# Patient Record
Sex: FEMALE | Race: WHITE | Employment: OTHER | URBAN - METROPOLITAN AREA
[De-identification: names, ages, dates, MRNs, and addresses within clinical notes are randomized per-mention and may not be internally consistent; named-entity substitution may affect disease eponyms.]

---

## 2017-04-27 PROBLEM — E66.01 MORBID OBESITY (HCC): Status: ACTIVE | Noted: 2017-04-27

## 2018-03-05 PROBLEM — E11.9 TYPE 2 DIABETES MELLITUS, WITHOUT LONG-TERM CURRENT USE OF INSULIN (HCC): Status: ACTIVE | Noted: 2018-03-05

## 2018-03-12 RX ORDER — ALLOPURINOL 100 MG/1
100 TABLET ORAL 2 TIMES DAILY
Qty: 180 TABLET | Refills: 1 | Status: SHIPPED | OUTPATIENT
Start: 2018-03-12 | End: 2018-10-05 | Stop reason: SDUPTHER

## 2018-03-16 ENCOUNTER — HOSPITAL ENCOUNTER (OUTPATIENT)
Dept: WOUND CARE | Age: 83
Discharge: HOME | End: 2018-03-16
Admitting: FAMILY MEDICINE
Payer: COMMERCIAL

## 2018-03-16 VITALS
HEIGHT: 68 IN | TEMPERATURE: 97.4 F | SYSTOLIC BLOOD PRESSURE: 120 MMHG | BODY MASS INDEX: 41.83 KG/M2 | HEART RATE: 70 BPM | WEIGHT: 276 LBS | DIASTOLIC BLOOD PRESSURE: 62 MMHG | RESPIRATION RATE: 20 BRPM

## 2018-03-16 PROCEDURE — 11042 DBRDMT SUBQ TIS 1ST 20SQCM/<: CPT

## 2018-03-16 PROCEDURE — 11042 DBRDMT SUBQ TIS 1ST 20SQCM/<: CPT | Performed by: FAMILY MEDICINE

## 2018-03-16 NOTE — PROGRESS NOTES
Follow-Up Wound Progress Note  Danie Bueno  AGE: 80 y.o. GENDER: female  DOD: 1935  TODAY'S DATE:  3/16/18  Subjective:    Danie Bueno is a 80 y.o. female who presents today for wound check. Wound Etiology :  pressure ulcer: Stage III  Wound Location :   on the right sacrum Pain : {3/10     Abx : Present      Overall Wound Assessment  Wound is is unchanged. Size has increased    Objective:    /62   Pulse 70   Temp 97.4 °F (36.3 °C) (Oral)   Resp 20   Ht 5' 8\" (1.727 m)   Wt 276 lb (125.2 kg)   BMI 41.97 kg/m²   Pressure Ulcer 01/17/13 Sacrum Inner Stage III new wound #2 pressure, sacral stage 3 (Active)   Sirisha-wound Assessment Maceration;Pale;Pink 3/16/2018 10:13 AM   Sirisha-Wound Moisture Macerated 2/22/2018 10:05 AM   Pressure Ulcer Staging Stage III 8/12/2013 12:36 PM   Wound Assessment Pink 3/16/2018 10:13 AM   Wound Length (cm) 1 cm 3/16/2018 10:54 AM   Wound Width (cm) 0.3 cm 3/16/2018 10:54 AM   Wound Depth (cm)  .5 3/16/2018 10:54 AM   Calculated Wound Size (cm^2) (l*w) 0.3 cm^2 3/16/2018 10:54 AM   Change in Wound Size % (l*w) -200 3/16/2018 10:54 AM   Culture Taken Yes 10/12/2017 11:07 AM   Dressing Status Clean;Dry; Intact 3/16/2018 11:11 AM   Dressing Changed Changed/New 3/16/2018 11:11 AM   Dressing/Treatment Alginate;Dry dressing;Silver dressing 3/16/2018 11:11 AM   Wound Cleansed Rinsed/Irrigated with saline 3/16/2018 11:11 AM   Dressing Change Due 02/23/18 2/22/2018 10:39 AM   Drainage Amount Scant 3/16/2018 10:13 AM   Drainage Description Serous; Yellow 3/16/2018 10:13 AM   Odor None 3/16/2018 10:13 AM   [place]%Wound Bed 100 1/11/2018  9:36 AM   Red%Wound Bed 100 11/10/2016 10:35 AM   Yellow%Wound Bed 30 9/15/2016 10:03 AM   Debridement per physician Subcutaneous 3/16/2018 10:54 AM   Time out Yes 3/16/2018 10:54 AM   Procedural Pain 7 3/16/2018 10:54 AM   Post procedural Pain 3 3/16/2018 10:54 AM   Number of days: 1883     Wound   serous exudate noted  Errythema - Absent Abigail Dickerson DO                           3/16/18     1:49 PM

## 2018-04-06 ENCOUNTER — HOSPITAL ENCOUNTER (OUTPATIENT)
Dept: WOUND CARE | Age: 83
Discharge: HOME | End: 2018-04-06
Payer: COMMERCIAL

## 2018-04-06 VITALS
SYSTOLIC BLOOD PRESSURE: 126 MMHG | RESPIRATION RATE: 20 BRPM | HEART RATE: 76 BPM | DIASTOLIC BLOOD PRESSURE: 68 MMHG | TEMPERATURE: 97.5 F

## 2018-04-06 PROCEDURE — 11042 DBRDMT SUBQ TIS 1ST 20SQCM/<: CPT

## 2018-04-09 ENCOUNTER — ANTI-COAG VISIT (OUTPATIENT)
Dept: PRIMARY CARE CLINIC | Age: 83
End: 2018-04-09
Payer: COMMERCIAL

## 2018-04-09 ENCOUNTER — OFFICE VISIT (OUTPATIENT)
Dept: PRIMARY CARE CLINIC | Age: 83
End: 2018-04-09
Payer: COMMERCIAL

## 2018-04-09 VITALS
BODY MASS INDEX: 41.68 KG/M2 | SYSTOLIC BLOOD PRESSURE: 128 MMHG | OXYGEN SATURATION: 98 % | HEART RATE: 81 BPM | WEIGHT: 275 LBS | HEIGHT: 68 IN | RESPIRATION RATE: 16 BRPM | DIASTOLIC BLOOD PRESSURE: 82 MMHG

## 2018-04-09 DIAGNOSIS — I48.91 ATRIAL FIBRILLATION, UNSPECIFIED TYPE (HCC): Primary | ICD-10-CM

## 2018-04-09 LAB — INTERNATIONAL NORMALIZATION RATIO, POC: 2.1

## 2018-04-09 PROCEDURE — 99214 OFFICE O/P EST MOD 30 MIN: CPT | Performed by: FAMILY MEDICINE

## 2018-04-09 PROCEDURE — 85610 PROTHROMBIN TIME: CPT | Performed by: FAMILY MEDICINE

## 2018-04-09 RX ORDER — LISINOPRIL 5 MG/1
TABLET ORAL
Refills: 1 | COMMUNITY
Start: 2018-04-05 | End: 2019-05-03

## 2018-04-09 ASSESSMENT — ENCOUNTER SYMPTOMS
BLOOD IN STOOL: 0
NAUSEA: 0
BLURRED VISION: 0
EYE DISCHARGE: 0
HEMOPTYSIS: 0
ABDOMINAL PAIN: 0
ORTHOPNEA: 0
SHORTNESS OF BREATH: 0

## 2018-04-09 ASSESSMENT — PATIENT HEALTH QUESTIONNAIRE - PHQ9
1. LITTLE INTEREST OR PLEASURE IN DOING THINGS: 0
SUM OF ALL RESPONSES TO PHQ QUESTIONS 1-9: 0
SUM OF ALL RESPONSES TO PHQ9 QUESTIONS 1 & 2: 0
2. FEELING DOWN, DEPRESSED OR HOPELESS: 0

## 2018-04-19 ENCOUNTER — HOSPITAL ENCOUNTER (EMERGENCY)
Age: 83
Discharge: HOME | End: 2018-04-19
Attending: EMERGENCY MEDICINE
Payer: COMMERCIAL

## 2018-04-19 ENCOUNTER — APPOINTMENT (OUTPATIENT)
Dept: CT IMAGING | Age: 83
End: 2018-04-19
Payer: COMMERCIAL

## 2018-04-19 VITALS
WEIGHT: 280 LBS | SYSTOLIC BLOOD PRESSURE: 124 MMHG | BODY MASS INDEX: 42.44 KG/M2 | HEIGHT: 68 IN | TEMPERATURE: 97.7 F | RESPIRATION RATE: 16 BRPM | OXYGEN SATURATION: 97 % | DIASTOLIC BLOOD PRESSURE: 60 MMHG | HEART RATE: 76 BPM

## 2018-04-19 DIAGNOSIS — L98.429 CHRONIC ULCER OF SACRAL REGION, UNSPECIFIED ULCER STAGE (HCC): ICD-10-CM

## 2018-04-19 DIAGNOSIS — S09.90XA CLOSED HEAD INJURY, INITIAL ENCOUNTER: Primary | ICD-10-CM
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
## 2018-04-19 LAB
INR BLD: 1.8
PROTHROMBIN TIME: 19.8 SEC (ref 9.3–12.4)

## 2018-04-19 PROCEDURE — 6370000000 HC RX 637 (ALT 250 FOR IP): Performed by: EMERGENCY MEDICINE

## 2018-04-19 PROCEDURE — 70450 CT HEAD/BRAIN W/O DYE: CPT

## 2018-04-19 PROCEDURE — 36415 COLL VENOUS BLD VENIPUNCTURE: CPT

## 2018-04-19 PROCEDURE — 72125 CT NECK SPINE W/O DYE: CPT

## 2018-04-19 PROCEDURE — 99284 EMERGENCY DEPT VISIT MOD MDM: CPT

## 2018-04-19 PROCEDURE — 85610 PROTHROMBIN TIME: CPT

## 2018-04-19 RX ORDER — SODIUM CHLORIDE 0.9 % (FLUSH) 0.9 %
SYRINGE (ML) INJECTION
Status: DISCONTINUED
Start: 2018-04-19 | End: 2018-04-19 | Stop reason: HOSPADM

## 2018-04-19 RX ORDER — HYDROCODONE BITARTRATE AND ACETAMINOPHEN 5; 325 MG/1; MG/1
1 TABLET ORAL ONCE
Status: COMPLETED | OUTPATIENT
Start: 2018-04-19 | End: 2018-04-19

## 2018-04-19 RX ADMIN — HYDROCODONE BITARTRATE AND ACETAMINOPHEN 1 TABLET: 5; 325 TABLET ORAL at 18:03

## 2018-04-19 ASSESSMENT — PAIN DESCRIPTION - LOCATION: LOCATION: BUTTOCKS

## 2018-04-19 ASSESSMENT — ENCOUNTER SYMPTOMS
DIARRHEA: 0
SORE THROAT: 0
TROUBLE SWALLOWING: 0
VOMITING: 0
NAUSEA: 0
CHEST TIGHTNESS: 0
SHORTNESS OF BREATH: 0
CONSTIPATION: 0
WHEEZING: 0
BLOOD IN STOOL: 0
BACK PAIN: 0
COUGH: 0
ABDOMINAL PAIN: 0
RHINORRHEA: 0

## 2018-04-19 ASSESSMENT — PAIN SCALES - GENERAL
PAINLEVEL_OUTOF10: 5
PAINLEVEL_OUTOF10: 5

## 2018-04-19 ASSESSMENT — PAIN DESCRIPTION - PAIN TYPE: TYPE: ACUTE PAIN

## 2018-04-20 ENCOUNTER — HOSPITAL ENCOUNTER (OUTPATIENT)
Dept: WOUND CARE | Age: 83
Discharge: HOME | End: 2018-04-20
Payer: COMMERCIAL

## 2018-04-20 VITALS
HEIGHT: 68 IN | BODY MASS INDEX: 42.44 KG/M2 | DIASTOLIC BLOOD PRESSURE: 80 MMHG | WEIGHT: 280 LBS | TEMPERATURE: 97.8 F | SYSTOLIC BLOOD PRESSURE: 140 MMHG | HEART RATE: 80 BPM | RESPIRATION RATE: 20 BRPM

## 2018-04-20 DIAGNOSIS — E66.01 MORBID OBESITY (HCC): ICD-10-CM

## 2018-04-20 ASSESSMENT — PAIN DESCRIPTION - PROGRESSION: CLINICAL_PROGRESSION: GRADUALLY WORSENING

## 2018-04-20 ASSESSMENT — PAIN DESCRIPTION - DESCRIPTORS: DESCRIPTORS: PRESSURE;SORE

## 2018-04-20 ASSESSMENT — PAIN DESCRIPTION - PAIN TYPE: TYPE: CHRONIC PAIN

## 2018-04-20 ASSESSMENT — PAIN DESCRIPTION - LOCATION: LOCATION: BUTTOCKS

## 2018-04-20 ASSESSMENT — PAIN DESCRIPTION - ORIENTATION: ORIENTATION: POSTERIOR

## 2018-04-20 ASSESSMENT — PAIN DESCRIPTION - FREQUENCY: FREQUENCY: INTERMITTENT

## 2018-04-20 ASSESSMENT — PAIN DESCRIPTION - ONSET: ONSET: ON-GOING

## 2018-04-20 ASSESSMENT — PAIN SCALES - GENERAL: PAINLEVEL_OUTOF10: 4

## 2018-05-04 ENCOUNTER — HOSPITAL ENCOUNTER (OUTPATIENT)
Dept: WOUND CARE | Age: 83
Discharge: HOME | End: 2018-05-04

## 2018-05-18 ENCOUNTER — HOSPITAL ENCOUNTER (OUTPATIENT)
Dept: WOUND CARE | Age: 83
Discharge: HOME | End: 2018-05-18
Payer: COMMERCIAL

## 2018-05-18 VITALS
BODY MASS INDEX: 41.81 KG/M2 | WEIGHT: 275 LBS | RESPIRATION RATE: 14 BRPM | DIASTOLIC BLOOD PRESSURE: 72 MMHG | HEART RATE: 78 BPM | TEMPERATURE: 97.9 F | SYSTOLIC BLOOD PRESSURE: 128 MMHG

## 2018-05-18 PROCEDURE — 11042 DBRDMT SUBQ TIS 1ST 20SQCM/<: CPT

## 2018-05-18 PROCEDURE — 11042 DBRDMT SUBQ TIS 1ST 20SQCM/<: CPT | Performed by: FAMILY MEDICINE

## 2018-05-18 ASSESSMENT — PAIN SCALES - GENERAL: PAINLEVEL_OUTOF10: 0

## 2018-05-25 RX ORDER — WARFARIN SODIUM 2 MG/1
2 TABLET ORAL 2 TIMES DAILY
Qty: 90 TABLET | Refills: 1 | Status: SHIPPED | OUTPATIENT
Start: 2018-05-25 | End: 2019-01-01

## 2018-05-25 RX ORDER — FUROSEMIDE 40 MG/1
40 TABLET ORAL DAILY
Qty: 90 TABLET | Refills: 1 | Status: SHIPPED | OUTPATIENT
Start: 2018-05-25

## 2018-05-25 RX ORDER — GABAPENTIN 300 MG/1
300 CAPSULE ORAL 3 TIMES DAILY
Qty: 270 CAPSULE | Refills: 0 | Status: SHIPPED | OUTPATIENT
Start: 2018-05-25 | End: 2018-09-18 | Stop reason: SDUPTHER

## 2018-06-08 ENCOUNTER — HOSPITAL ENCOUNTER (OUTPATIENT)
Dept: WOUND CARE | Age: 83
Discharge: HOME | End: 2018-06-08
Payer: COMMERCIAL

## 2018-06-08 VITALS
RESPIRATION RATE: 16 BRPM | SYSTOLIC BLOOD PRESSURE: 118 MMHG | DIASTOLIC BLOOD PRESSURE: 68 MMHG | HEART RATE: 88 BPM | TEMPERATURE: 97.8 F

## 2018-06-08 PROCEDURE — 11042 DBRDMT SUBQ TIS 1ST 20SQCM/<: CPT

## 2018-06-08 PROCEDURE — 11042 DBRDMT SUBQ TIS 1ST 20SQCM/<: CPT | Performed by: FAMILY MEDICINE

## 2018-06-08 ASSESSMENT — PAIN DESCRIPTION - LOCATION: LOCATION: BUTTOCKS

## 2018-06-08 ASSESSMENT — PAIN DESCRIPTION - PAIN TYPE: TYPE: CHRONIC PAIN

## 2018-06-08 ASSESSMENT — PAIN SCALES - GENERAL: PAINLEVEL_OUTOF10: 2

## 2018-06-08 ASSESSMENT — PAIN DESCRIPTION - DESCRIPTORS: DESCRIPTORS: NAGGING

## 2018-06-11 ENCOUNTER — OFFICE VISIT (OUTPATIENT)
Dept: PRIMARY CARE CLINIC | Age: 83
End: 2018-06-11
Payer: COMMERCIAL

## 2018-06-11 VITALS
DIASTOLIC BLOOD PRESSURE: 80 MMHG | TEMPERATURE: 96 F | WEIGHT: 274 LBS | BODY MASS INDEX: 41.66 KG/M2 | SYSTOLIC BLOOD PRESSURE: 130 MMHG | HEART RATE: 72 BPM

## 2018-06-11 DIAGNOSIS — E78.2 MIXED HYPERLIPIDEMIA: ICD-10-CM

## 2018-06-11 DIAGNOSIS — E03.9 ACQUIRED HYPOTHYROIDISM: ICD-10-CM

## 2018-06-11 DIAGNOSIS — E11.42 TYPE 2 DIABETES MELLITUS WITH DIABETIC POLYNEUROPATHY, WITHOUT LONG-TERM CURRENT USE OF INSULIN (HCC): Primary | ICD-10-CM

## 2018-06-11 DIAGNOSIS — E66.01 MORBID OBESITY (HCC): ICD-10-CM

## 2018-06-11 DIAGNOSIS — L30.9 DERMATITIS: ICD-10-CM

## 2018-06-11 DIAGNOSIS — L89.153 PRESSURE ULCER OF COCCYGEAL REGION, STAGE 3 (HCC): ICD-10-CM

## 2018-06-11 DIAGNOSIS — I10 ESSENTIAL HYPERTENSION: ICD-10-CM

## 2018-06-11 DIAGNOSIS — I48.91 ATRIAL FIBRILLATION, UNSPECIFIED TYPE (HCC): ICD-10-CM

## 2018-06-11 LAB
GLUCOSE BLD-MCNC: 95 MG/DL
INTERNATIONAL NORMALIZATION RATIO, POC: 3.3
PROTHROMBIN TIME, POC: 39.9

## 2018-06-11 PROCEDURE — 99214 OFFICE O/P EST MOD 30 MIN: CPT | Performed by: FAMILY MEDICINE

## 2018-06-11 PROCEDURE — 85610 PROTHROMBIN TIME: CPT | Performed by: FAMILY MEDICINE

## 2018-06-11 PROCEDURE — 82962 GLUCOSE BLOOD TEST: CPT | Performed by: FAMILY MEDICINE

## 2018-06-11 ASSESSMENT — ENCOUNTER SYMPTOMS
EYE DISCHARGE: 0
SHORTNESS OF BREATH: 0
BLURRED VISION: 0
ABDOMINAL PAIN: 0
ORTHOPNEA: 0
ROS SKIN COMMENTS: HAIR LOSS
BLOOD IN STOOL: 0
HEMOPTYSIS: 0
NAUSEA: 0

## 2018-06-29 ENCOUNTER — HOSPITAL ENCOUNTER (OUTPATIENT)
Dept: WOUND CARE | Age: 83
Discharge: HOME | End: 2018-06-29
Payer: COMMERCIAL

## 2018-06-29 VITALS
SYSTOLIC BLOOD PRESSURE: 128 MMHG | TEMPERATURE: 97.6 F | HEART RATE: 72 BPM | DIASTOLIC BLOOD PRESSURE: 70 MMHG | BODY MASS INDEX: 41.68 KG/M2 | RESPIRATION RATE: 20 BRPM | HEIGHT: 68 IN | WEIGHT: 275 LBS

## 2018-06-29 DIAGNOSIS — E66.01 MORBID OBESITY (HCC): ICD-10-CM

## 2018-06-29 LAB
ALBUMIN SERPL-MCNC: NORMAL G/DL
ALP BLD-CCNC: NORMAL U/L
ALT SERPL-CCNC: NORMAL U/L
ANION GAP SERPL CALCULATED.3IONS-SCNC: NORMAL MMOL/L
AST SERPL-CCNC: NORMAL U/L
AVERAGE GLUCOSE: 76
BASOPHILS ABSOLUTE: NORMAL /ΜL
BASOPHILS RELATIVE PERCENT: NORMAL %
BILIRUB SERPL-MCNC: NORMAL MG/DL (ref 0.1–1.4)
BUN BLDV-MCNC: NORMAL MG/DL
CALCIUM SERPL-MCNC: NORMAL MG/DL
CHLORIDE BLD-SCNC: NORMAL MMOL/L
CHOLESTEROL, TOTAL: 133 MG/DL
CHOLESTEROL/HDL RATIO: 2.9
CO2: NORMAL MMOL/L
CREAT SERPL-MCNC: NORMAL MG/DL
EOSINOPHILS ABSOLUTE: NORMAL /ΜL
EOSINOPHILS RELATIVE PERCENT: NORMAL %
GFR CALCULATED: NORMAL
GLUCOSE BLD-MCNC: NORMAL MG/DL
HBA1C MFR BLD: 6.9 %
HCT VFR BLD CALC: NORMAL % (ref 36–46)
HDLC SERPL-MCNC: 46 MG/DL (ref 35–70)
HEMOGLOBIN: NORMAL G/DL (ref 12–16)
LDL CHOLESTEROL CALCULATED: 61 MG/DL (ref 0–160)
LYMPHOCYTES ABSOLUTE: NORMAL /ΜL
LYMPHOCYTES RELATIVE PERCENT: NORMAL %
MCH RBC QN AUTO: NORMAL PG
MCHC RBC AUTO-ENTMCNC: NORMAL G/DL
MCV RBC AUTO: NORMAL FL
MONOCYTES ABSOLUTE: NORMAL /ΜL
MONOCYTES RELATIVE PERCENT: NORMAL %
NEUTROPHILS ABSOLUTE: NORMAL /ΜL
NEUTROPHILS RELATIVE PERCENT: NORMAL %
PLATELET # BLD: NORMAL K/ΜL
PMV BLD AUTO: NORMAL FL
POTASSIUM SERPL-SCNC: NORMAL MMOL/L
RBC # BLD: NORMAL 10^6/ΜL
SODIUM BLD-SCNC: NORMAL MMOL/L
T4 TOTAL: 10
TOTAL PROTEIN: NORMAL
TRIGL SERPL-MCNC: 132 MG/DL
TSH SERPL DL<=0.05 MIU/L-ACNC: 0.17 UIU/ML
VLDLC SERPL CALC-MCNC: NORMAL MG/DL
WBC # BLD: NORMAL 10^3/ML

## 2018-06-29 PROCEDURE — 11042 DBRDMT SUBQ TIS 1ST 20SQCM/<: CPT

## 2018-06-29 PROCEDURE — 11042 DBRDMT SUBQ TIS 1ST 20SQCM/<: CPT | Performed by: FAMILY MEDICINE

## 2018-06-29 ASSESSMENT — PAIN DESCRIPTION - DESCRIPTORS: DESCRIPTORS: JABBING

## 2018-06-29 ASSESSMENT — PAIN SCALES - GENERAL: PAINLEVEL_OUTOF10: 2

## 2018-06-29 ASSESSMENT — PAIN DESCRIPTION - LOCATION: LOCATION: BUTTOCKS

## 2018-06-29 ASSESSMENT — PAIN DESCRIPTION - FREQUENCY: FREQUENCY: INTERMITTENT

## 2018-06-29 ASSESSMENT — PAIN DESCRIPTION - ONSET: ONSET: ON-GOING

## 2018-06-29 ASSESSMENT — PAIN DESCRIPTION - PAIN TYPE: TYPE: CHRONIC PAIN

## 2018-06-29 ASSESSMENT — PAIN DESCRIPTION - PROGRESSION: CLINICAL_PROGRESSION: NOT CHANGED

## 2018-06-29 ASSESSMENT — PAIN DESCRIPTION - ORIENTATION: ORIENTATION: POSTERIOR

## 2018-07-02 DIAGNOSIS — E11.42 TYPE 2 DIABETES MELLITUS WITH DIABETIC POLYNEUROPATHY, WITHOUT LONG-TERM CURRENT USE OF INSULIN (HCC): ICD-10-CM

## 2018-07-02 DIAGNOSIS — L89.153 PRESSURE ULCER OF COCCYGEAL REGION, STAGE 3 (HCC): ICD-10-CM

## 2018-07-02 DIAGNOSIS — E78.2 MIXED HYPERLIPIDEMIA: ICD-10-CM

## 2018-07-02 DIAGNOSIS — I10 ESSENTIAL HYPERTENSION: ICD-10-CM

## 2018-07-02 DIAGNOSIS — E03.9 ACQUIRED HYPOTHYROIDISM: ICD-10-CM

## 2018-07-20 ENCOUNTER — HOSPITAL ENCOUNTER (OUTPATIENT)
Dept: WOUND CARE | Age: 83
Discharge: HOME | End: 2018-07-20
Payer: COMMERCIAL

## 2018-07-20 VITALS
SYSTOLIC BLOOD PRESSURE: 120 MMHG | HEIGHT: 68 IN | HEART RATE: 78 BPM | WEIGHT: 275 LBS | DIASTOLIC BLOOD PRESSURE: 70 MMHG | BODY MASS INDEX: 41.68 KG/M2 | TEMPERATURE: 97.6 F | RESPIRATION RATE: 18 BRPM

## 2018-07-20 DIAGNOSIS — L89.153 DECUBITUS ULCER OF SACRAL REGION, STAGE 3 (HCC): ICD-10-CM

## 2018-07-20 DIAGNOSIS — E66.01 MORBID OBESITY (HCC): ICD-10-CM

## 2018-07-20 DIAGNOSIS — L89.153 PRESSURE ULCER OF COCCYGEAL REGION, STAGE 3 (HCC): Primary | Chronic | ICD-10-CM

## 2018-07-20 PROCEDURE — 11042 DBRDMT SUBQ TIS 1ST 20SQCM/<: CPT | Performed by: FAMILY MEDICINE

## 2018-07-20 PROCEDURE — 11042 DBRDMT SUBQ TIS 1ST 20SQCM/<: CPT

## 2018-07-20 ASSESSMENT — PAIN DESCRIPTION - FREQUENCY: FREQUENCY: INTERMITTENT

## 2018-07-20 ASSESSMENT — PAIN DESCRIPTION - ORIENTATION: ORIENTATION: POSTERIOR

## 2018-07-20 ASSESSMENT — PAIN DESCRIPTION - DESCRIPTORS: DESCRIPTORS: STABBING

## 2018-07-20 ASSESSMENT — PAIN SCALES - GENERAL
PAINLEVEL_OUTOF10: 2
PAINLEVEL_OUTOF10: 0

## 2018-07-20 ASSESSMENT — PAIN DESCRIPTION - LOCATION: LOCATION: BUTTOCKS

## 2018-07-20 ASSESSMENT — PAIN DESCRIPTION - PAIN TYPE: TYPE: CHRONIC PAIN

## 2018-07-20 ASSESSMENT — PAIN DESCRIPTION - PROGRESSION
CLINICAL_PROGRESSION: NOT CHANGED
CLINICAL_PROGRESSION: NOT CHANGED

## 2018-07-20 ASSESSMENT — PAIN DESCRIPTION - ONSET: ONSET: ON-GOING

## 2018-07-20 NOTE — PROGRESS NOTES
Measurements shown are from today's visit. Assessment:     Please refer to nursing measurements and assessment regarding wound size pre and post debridement. Wound check - Care provided includes removal of existing dressing and visual inspection    Procedure: Debridement Note; Wound # 2. At 0.3 cm sq. The patient was placed in the sitting position. Lidocaine  gauze was applied  at beginning of wound evaluation. An  Excisional Debridement was performed. Using a curette ,  the wound was debrided sharply of all fibrotic, necrotic, and hyperkeratotic tissue, including a layer of surrounding healthy tissue to stimulate epithelization. The wound was excised through the level of the subcutaneous Wound Percentage debrided is 100%. Please refer to Nurses notes for pre and post debridement dimensions. Wound was irrigated with normal saline solution. Bleeding was with a small amount of bleeding, and controlled with pressure. Patient tolerated procedure well and was given proper instruction. Diagnosis: pressure ulcer: Stage III                      Patient Active Problem List   Diagnosis Code    Pressure ulcer of coccygeal region, stage 3 ([facility] [address] L89.153    Pressure ulcer, sacrum L89.159    Cardiomegaly T57.2    Acute systolic CHF (congestive heart failure) ([facility]) I50.21    Atrial fibrillation ([facility]) I48.91    DM type 2 (diabetes mellitus, type 2) ([address] E11.9    Morbid obesity ([address] E66.01    Type 2 diabetes mellitus, without long-term current use of insulin ([address] E11.9           Plan:      Treatment & Plan: 1.Excisional Debridement                              2. Mupirocin 2 %. Daily. 3. Discussed appropriate home care of this wound. 4. Patient instructions were given. 5. Follow Up in 1 week(s). Kasey Magallanes,                            7/20/18     2:17 PM

## 2018-08-06 ENCOUNTER — OFFICE VISIT (OUTPATIENT)
Dept: PRIMARY CARE CLINIC | Age: 83
End: 2018-08-06
Payer: COMMERCIAL

## 2018-08-06 ENCOUNTER — ANTI-COAG VISIT (OUTPATIENT)
Dept: PRIMARY CARE CLINIC | Age: 83
End: 2018-08-06
Payer: COMMERCIAL

## 2018-08-06 DIAGNOSIS — Z79.4 TYPE 2 DIABETES MELLITUS WITHOUT COMPLICATION, WITH LONG-TERM CURRENT USE OF INSULIN (HCC): Primary | ICD-10-CM

## 2018-08-06 DIAGNOSIS — Z78.0 POST-MENOPAUSAL: ICD-10-CM

## 2018-08-06 DIAGNOSIS — E78.2 MIXED HYPERLIPIDEMIA: ICD-10-CM

## 2018-08-06 DIAGNOSIS — E11.9 TYPE 2 DIABETES MELLITUS WITHOUT COMPLICATION, WITH LONG-TERM CURRENT USE OF INSULIN (HCC): Primary | ICD-10-CM

## 2018-08-06 DIAGNOSIS — I48.91 ATRIAL FIBRILLATION, UNSPECIFIED TYPE (HCC): ICD-10-CM

## 2018-08-06 LAB
GLUCOSE BLD-MCNC: 75 MG/DL
INTERNATIONAL NORMALIZATION RATIO, POC: 2.1
INTERNATIONAL NORMALIZATION RATIO, POC: 2.1
PROTHROMBIN TIME, POC: 25.4

## 2018-08-06 PROCEDURE — 99213 OFFICE O/P EST LOW 20 MIN: CPT | Performed by: FAMILY MEDICINE

## 2018-08-06 PROCEDURE — 85610 PROTHROMBIN TIME: CPT | Performed by: FAMILY MEDICINE

## 2018-08-06 PROCEDURE — 82962 GLUCOSE BLOOD TEST: CPT | Performed by: FAMILY MEDICINE

## 2018-08-06 ASSESSMENT — ENCOUNTER SYMPTOMS
ORTHOPNEA: 0
EYE DISCHARGE: 0
HEMOPTYSIS: 0
BLURRED VISION: 0
ABDOMINAL PAIN: 0
NAUSEA: 0
BLOOD IN STOOL: 0
SHORTNESS OF BREATH: 0

## 2018-08-06 NOTE — PATIENT INSTRUCTIONS
Continue same dose of Coumadin  Continue low-sodium low-fat diabetic diet  The lab work done before the next visit  Regular exercises  Elevated the legs  Follow with the wound care clinic  Continue the medications  Return to clinic earlier if any problems

## 2018-08-06 NOTE — PROGRESS NOTES
OFFICE PROGRESS NOTE      SUBJECTIVE:        Patient ID:   Mike Jarrell is a 80 y.o. female who presents for   Chief Complaint   Patient presents with    Diabetes     Here for recheck on Hypertension,DM and Atrial Fibrillation. Lab work done,here for review. C/o pain in bilateral lower legs and Lt knee pain. HPI:     Patient said this is feeling okay  Has not follow the diet for a few days  Has been going to the wound care for the ulcer in the back  Has been seen by the home health care  Patient stays home by herself        Prior to Admission medications    Medication Sig Start Date End Date Taking? Authorizing Provider   mupirocin (BACTROBAN) 2 % ointment Apply topically  Daily, to Ulcer on Back 7/20/18  Yes Renato March, DO   Multiple Vitamins-Minerals (CENTRUM SILVER PO) Take 1 tablet by mouth daily   Yes Historical Provider, MD   warfarin (COUMADIN) 2 MG tablet Take 1 tablet by mouth 2 times daily  Patient taking differently: Take 2 mg by mouth  5/25/18  Yes Maite Rodriguez MD   furosemide (LASIX) 40 MG tablet Take 1 tablet by mouth daily 5/25/18  Yes Maite Rodriguez MD   insulin detemir (LEVEMIR FLEXPEN) 100 UNIT/ML injection pen Inject 62 Units into the skin 2 times daily 62 units in am and 62 units pm 5/25/18  Yes Maite Rodriguez MD   gabapentin (NEURONTIN) 300 MG capsule Take 1 capsule by mouth 3 times daily for 123 days. . 5/25/18 9/25/18 Yes Maite Rodriguez MD   lisinopril (PRINIVIL;ZESTRIL) 5 MG tablet  4/5/18  Yes Historical Provider, MD   glucose blood VI test strips (ASCENSIA AUTODISC VI;ONE TOUCH ULTRA TEST VI) strip 1 each by In Vitro route 3 times daily E11.9 3/16/18  Yes Maite Rodriguez MD   allopurinol (ZYLOPRIM) 100 MG tablet Take 1 tablet by mouth 2 times daily 3/12/18  Yes Maite Rodriguez MD   B-D UF III MINI PEN NEEDLES 31G X 5 MM MISC use twice daily 2/5/18  Yes Historical Provider, MD   acetaminophen (TYLENOL) 325 MG tablet Take 650 mg by mouth every 6 hours as needed for Pain    Yes Historical Provider, MD   Multiple Vitamins-Minerals (OCUVITE PO) Take by mouth   Yes Historical Provider, MD   carvedilol (COREG) 3.125 MG tablet Take 1 tablet by mouth 2 times daily (with meals) 2/18/16  Yes Paola Lombard, MD   aspirin 81 MG tablet Take 81 mg by mouth daily. Yes Historical Provider, MD   SIMVASTATIN PO Take 20 mg by mouth Daily. Yes Historical Provider, MD   levothyroxine (SYNTHROID) 175 MCG tablet Take 175 mcg by mouth Daily. Yes Historical Provider, MD   warfarin (COUMADIN) 6 MG tablet Take 1 tablet by mouth daily  Patient taking differently: Take 2 mg by mouth daily 2 mg 2/18/16   Paola Lombard, MD     Social History     Social History    Marital status:      Spouse name: N/A    Number of children: N/A    Years of education: N/A     Social History Main Topics    Smoking status: Former Smoker    Smokeless tobacco: Never Used    Alcohol use No    Drug use: No    Sexual activity: Not Currently     Partners: Male     Other Topics Concern    Not on file     Social History Narrative    No narrative on file       I have reviewed Genet's allergies, medications, problem list, medical, social and family history and have updated as needed in the electronic medical record  Review Of Systems:    Review of Systems   Constitutional: Negative for chills and fever. HENT: Negative for congestion and ear pain. Eyes: Negative for blurred vision and discharge. Respiratory: Negative for hemoptysis and shortness of breath (No new SOB). Cardiovascular: Positive for leg swelling. Negative for chest pain and orthopnea. Gastrointestinal: Negative for abdominal pain, blood in stool and nausea. Genitourinary: Negative for flank pain and hematuria. Musculoskeletal: Negative for myalgias and neck pain. Sacral ulcer   Skin: Negative for rash. Neurological: Negative for dizziness, focal weakness and seizures. Endo/Heme/Allergies: Negative for polydipsia. Does not bruise/bleed easily. Psychiatric/Behavioral: Negative for depression, hallucinations and suicidal ideas. OBJECTIVE:     VS:  Wt Readings from Last 3 Encounters:   07/20/18 275 lb (124.7 kg)   06/29/18 275 lb (124.7 kg)   06/11/18 274 lb (124.3 kg)     Temp Readings from Last 3 Encounters:   07/20/18 97.6 °F (36.4 °C) (Oral)   06/29/18 97.6 °F (36.4 °C) (Oral)   06/11/18 96 °F (35.6 °C) (Temporal)     BP Readings from Last 3 Encounters:   07/20/18 120/70   06/29/18 128/70   06/11/18 130/80        Physical Exam   Constitutional: She is oriented to person, place, and time. She appears well-developed and well-nourished. HENT:   Head: Normocephalic and atraumatic. Mouth/Throat: Oropharynx is clear and moist.   Eyes: Conjunctivae are normal. Pupils are equal, round, and reactive to light. Neck: Normal range of motion. Neck supple. Cardiovascular: Normal rate, regular rhythm, normal heart sounds and intact distal pulses. Pulmonary/Chest: Effort normal and breath sounds normal.   Abdominal: Soft. Bowel sounds are normal.   Musculoskeletal: Normal range of motion. She exhibits edema. Neurological: She is alert and oriented to person, place, and time. Skin: Skin is warm and dry.    Psychiatric: Thought content normal.          Labs :    Lab Results   Component Value Date    WBC  06/29/2018      Comment:       See Scan    HGB 12.0 03/09/2018    HCT 36.8 03/09/2018     03/09/2018    CHOL 133 06/29/2018    TRIG 132 06/29/2018    HDL 46 06/29/2018    ALT 20 03/09/2018    AST 31 03/09/2018    NA  06/29/2018      Comment:      see scan    K 4.5 03/09/2018    CL 98 03/09/2018    CREATININE 0.9 03/09/2018    BUN 32 (H) 03/09/2018    CO2 26 03/09/2018    TSH 0.174 06/29/2018    INR 2.1 08/06/2018    LABA1C 6.9 06/29/2018    LABMICR 18.0 08/23/2016     Lab Results   Component Value Date    COLORU Yellow 09/13/2016    NITRU POSITIVE 09/13/2016 GLUCOSEU Negative 09/13/2016    KETUA Negative 09/13/2016    UROBILINOGEN 0.2 09/13/2016    BILIRUBINUR Negative 09/13/2016     No results found for: PSA, CEA, , DS1140,       Controlled Substances Monitoring:                                    ASSESSMENT     Patient Active Problem List    Diagnosis Date Noted    Type 2 diabetes mellitus, without long-term current use of insulin ([facility] 03/05/2018    Morbid obesity ([facility] [facility] 64/68/2969    Acute systolic CHF (congestive heart failure) ([facility] [facility] 02/13/2016    Atrial fibrillation ([facility] [facility] 02/13/2016    DM type 2 (diabetes mellitus, type 2) ([facility] [facility] 02/13/2016    Cardiomegaly 02/12/2016    Pressure ulcer of coccygeal region, stage 3 ([facility] [facility] 01/23/2014    Pressure ulcer, sacrum 01/23/2014        Diagnosis:     ICD-10-CM ICD-9-CM    1. Type 2 diabetes mellitus without complication, with long-term current use of insulin ([facility]) E11.9 250.00 POCT Glucose    Z79.4 V58.67 CBC      COMPREHENSIVE METABOLIC PANEL      HEMOGLOBIN A1C      MICROALBUMIN, UR   2. Atrial fibrillation, unspecified type ([facility] I48.91 427.31 POCT INR      CBC   3. Post-menopausal Z78.0 V49.81 DEXA Bone Density Axial Skeleton   4. Mixed hyperlipidemia E78.2 272.2 LIPID PANEL       PLAN:   Continue same dose of Coumadin  Continue the medications  Low-sodium low-fat diabetic diet  Repeat the lab work  Follow up with the wound care        Patient Instructions   Continue same dose of Coumadin  Continue low-sodium low-fat diabetic diet  The lab work done before the next visit  Regular exercises  Elevated the legs  Follow with the wound care clinic  Continue the medications  Return to clinic earlier if any problems      Return in about 6 weeks (around 9/17/2018). I have reviewed my findings and recommendations with Herbie Vasquez.     Electronically signed by Liang Carrillo MD on 8/6/18 at 2:45 PM

## 2018-08-10 ENCOUNTER — HOSPITAL ENCOUNTER (OUTPATIENT)
Dept: WOUND CARE | Age: 83
Discharge: HOME | End: 2018-08-10
Payer: COMMERCIAL

## 2018-08-10 VITALS
RESPIRATION RATE: 20 BRPM | SYSTOLIC BLOOD PRESSURE: 130 MMHG | BODY MASS INDEX: 41.68 KG/M2 | HEIGHT: 68 IN | TEMPERATURE: 97.7 F | DIASTOLIC BLOOD PRESSURE: 68 MMHG | WEIGHT: 275 LBS | HEART RATE: 74 BPM

## 2018-08-10 DIAGNOSIS — E66.01 MORBID OBESITY (HCC): ICD-10-CM

## 2018-08-10 PROCEDURE — 11042 DBRDMT SUBQ TIS 1ST 20SQCM/<: CPT

## 2018-08-10 PROCEDURE — 11042 DBRDMT SUBQ TIS 1ST 20SQCM/<: CPT | Performed by: FAMILY MEDICINE

## 2018-08-10 NOTE — PROGRESS NOTES
Measurements shown are from today's visit. Assessment:     Please refer to nursing measurements and assessment regarding wound size pre and post debridement. Wound check - Care provided includes removal of existing dressing and visual inspection    Procedure: Debridement Note: Wound # 2. At 0.18 cm sq. The patient was placed in the sitting position. Lidocaine  gauze was applied  at beginning of wound evaluation. An  Excisional Debridement was performed. Using a curette ,  the wound was debrided sharply of all fibrotic, necrotic, and hyperkeratotic tissue, including a layer of surrounding healthy tissue to stimulate epithelization. The wound was excised through the level of the subcutaneous Wound Percentage debrided is 100%. Please refer to Nurses notes for pre and post debridement dimensions. Wound was irrigated with normal saline solution. Bleeding was with a small amount of bleeding, and controlled with pressure. Patient tolerated procedure well and was given proper instruction. Diagnosis: pressure ulcer: Stage III                      Patient Active Problem List   Diagnosis Code    Pressure ulcer of coccygeal region, stage 3 ([facility] [address] L89.153    Pressure ulcer, sacrum L89.159    Cardiomegaly H12.4    Acute systolic CHF (congestive heart failure) ([facility]) I50.21    Atrial fibrillation ([facility]) I48.91    DM type 2 (diabetes mellitus, type 2) ([address] E11.9    Morbid obesity ([address] E66.01    Type 2 diabetes mellitus, without long-term current use of insulin ([address] E11.9           Plan:      Treatment & Plan: 1.Excisional Debridement                              2. Silver alginate                              3. Discussed appropriate home care of this wound. 4. Patient instructions were given. 5. Follow Up in 3 week(s).                                      Tavon Modi

## 2018-08-27 RX ORDER — SIMVASTATIN 20 MG
20 TABLET ORAL DAILY
Qty: 90 TABLET | Refills: 1 | Status: SHIPPED | OUTPATIENT
Start: 2018-08-27 | End: 2018-10-25 | Stop reason: SDUPTHER

## 2018-08-31 ENCOUNTER — HOSPITAL ENCOUNTER (OUTPATIENT)
Dept: WOUND CARE | Age: 83
Discharge: HOME | End: 2018-08-31
Payer: COMMERCIAL

## 2018-08-31 VITALS
SYSTOLIC BLOOD PRESSURE: 110 MMHG | HEART RATE: 68 BPM | RESPIRATION RATE: 14 BRPM | DIASTOLIC BLOOD PRESSURE: 58 MMHG | TEMPERATURE: 97.7 F

## 2018-08-31 PROCEDURE — 11042 DBRDMT SUBQ TIS 1ST 20SQCM/<: CPT

## 2018-08-31 PROCEDURE — 11042 DBRDMT SUBQ TIS 1ST 20SQCM/<: CPT | Performed by: FAMILY MEDICINE

## 2018-08-31 ASSESSMENT — PAIN SCALES - GENERAL: PAINLEVEL_OUTOF10: 5

## 2018-08-31 NOTE — PROGRESS NOTES
Follow-Up Wound Progress Note  Saranya Mesa  AGE: 80 y.o. GENDER: female  DOD: 1935  TODAY'S DATE:  8/31/18  Subjective:    Saranya Mesa is a 80 y.o. female who presents today for wound check. Wound Etiology :  pressure ulcer: Stage III  Wound Location :  on right SI joint area Pain : {3/10     Abx : Present      Overall Wound Assessment  Wound is has improved. Size has decreased    Objective:    BP (!) 110/58   Pulse 68   Temp 97.7 °F (36.5 °C) (Oral)   Resp 14   Pressure Ulcer 01/17/13 Sacrum Inner Stage III new wound #2 pressure, sacral stage 3 (Active)   Sirisha-Wound Moisture Intact 8/31/2018 10:38 AM   Wound Assessment ALONZO 8/10/2018 10:22 AM   Wound Length (cm) 0.4 cm 8/31/2018 11:05 AM   Wound Width (cm) 0.3 cm 8/31/2018 11:05 AM   Wound Depth (cm)  .6 8/31/2018 11:05 AM   Calculated Wound Size (cm^2) (l*w) 0.12 cm^2 8/31/2018 11:05 AM   Change in Wound Size % (l*w) -20 8/31/2018 11:05 AM   Dressing Status Changed 8/10/2018 10:36 AM   Dressing Changed Changed/New 8/10/2018 10:36 AM   Dressing/Treatment 4x4;Alginate;Silver dressing 8/10/2018 10:36 AM   Wound Cleansed Rinsed/Irrigated with saline; Wound cleanser 8/10/2018 10:36 AM   Drainage Amount None 8/31/2018 10:38 AM   Odor None 8/10/2018 10:22 AM   Time out Yes 8/31/2018 11:05 AM   Procedural Pain 5 8/31/2018 11:05 AM   Post procedural Pain 2 8/31/2018 11:05 AM   Number of days: 2052     Wound   serous exudate noted  Errythema - Present    (this wound was originally measured on:)            Measurements shown are from today's visit. Assessment:     Please refer to nursing measurements and assessment regarding wound size pre and post debridement. Wound check - Care provided includes removal of existing dressing and visual inspection    Procedure: Debridement Note: Wound # 2. At 0.12 cm sq. The patient was placed in the sitting position. Lidocaine  gauze was applied  at beginning of wound evaluation.  An Excisional Debridement was performed. Using a curette and tissue nippers ,  the wound was debrided sharply of all fibrotic, necrotic, and hyperkeratotic tissue, including a layer of surrounding healthy tissue to stimulate epithelization. The wound was excised through the level of the subcutaneous Wound Percentage debrided is 100%. Please refer to Nurses notes for pre and post debridement dimensions. Wound was irrigated with normal saline solution. Bleeding was with a small amount of bleeding, and controlled with pressure. Patient tolerated procedure well and was given proper instruction. Diagnosis: pressure ulcer: Stage III                      Patient Active Problem List   Diagnosis Code    Pressure ulcer of coccygeal region, stage 3 ([facility] L89.153    Pressure ulcer, sacrum L89.159    Cardiomegaly O85.1    Acute systolic CHF (congestive heart failure) ([facility]) I50.21    Atrial fibrillation ([facility]) I48.91    DM type 2 (diabetes mellitus, type 2) ([facility] [address] E11.9    Morbid obesity ([facility] E66.01    Type 2 diabetes mellitus, without long-term current use of insulin ([facility] E11.9           Plan:      Treatment & Plan: 1.Excisional Debridement                              2. Alginate Silver                              3. Discussed appropriate home care of this wound. 4. Patient instructions were given. 5. Follow Up in 3 week(s).                                      Jim Klein DO                           8/31/18     3:57 PM

## 2018-09-10 ENCOUNTER — HOSPITAL ENCOUNTER (OUTPATIENT)
Age: 83
End: 2018-09-10
Payer: COMMERCIAL

## 2018-09-10 ENCOUNTER — HOSPITAL ENCOUNTER (OUTPATIENT)
Age: 83
Discharge: HOME | End: 2018-09-10
Payer: COMMERCIAL

## 2018-09-10 ENCOUNTER — HOSPITAL ENCOUNTER (OUTPATIENT)
Dept: MAMMOGRAPHY | Age: 83
Discharge: HOME | End: 2018-09-12
Payer: COMMERCIAL

## 2018-09-10 DIAGNOSIS — Z78.0 POST-MENOPAUSAL: ICD-10-CM

## 2018-09-10 DIAGNOSIS — Z79.4 TYPE 2 DIABETES MELLITUS WITHOUT COMPLICATION, WITH LONG-TERM CURRENT USE OF INSULIN (HCC): ICD-10-CM

## 2018-09-10 DIAGNOSIS — I48.91 ATRIAL FIBRILLATION, UNSPECIFIED TYPE (HCC): ICD-10-CM

## 2018-09-10 DIAGNOSIS — E11.9 TYPE 2 DIABETES MELLITUS WITHOUT COMPLICATION, WITH LONG-TERM CURRENT USE OF INSULIN (HCC): ICD-10-CM

## 2018-09-10 LAB
ALBUMIN SERPL-MCNC: 3.8 G/DL (ref 3.5–5.2)
ALP BLD-CCNC: 124 U/L (ref 35–104)
ALT SERPL-CCNC: 19 U/L (ref 0–32)
ANION GAP SERPL CALCULATED.3IONS-SCNC: 12 MMOL/L (ref 7–16)
AST SERPL-CCNC: 28 U/L (ref 0–31)
BILIRUB SERPL-MCNC: 0.6 MG/DL (ref 0–1.2)
BUN BLDV-MCNC: 27 MG/DL (ref 8–23)
CALCIUM SERPL-MCNC: 9.5 MG/DL (ref 8.6–10.2)
CHLORIDE BLD-SCNC: 98 MMOL/L (ref 98–107)
CHOLESTEROL, TOTAL: 165 MG/DL (ref 0–199)
CO2: 27 MMOL/L (ref 22–29)
CREAT SERPL-MCNC: 1 MG/DL (ref 0.5–1)
GFR AFRICAN AMERICAN: >60
GFR NON-AFRICAN AMERICAN: 53 ML/MIN/1.73
GLUCOSE BLD-MCNC: 98 MG/DL (ref 74–109)
HBA1C MFR BLD: 6.6 % (ref 4–5.6)
HCT VFR BLD CALC: 41.6 % (ref 34–48)
HDLC SERPL-MCNC: 55 MG/DL
HEMOGLOBIN: 13.2 G/DL (ref 11.5–15.5)
LDL CHOLESTEROL CALCULATED: 60 MG/DL (ref 0–99)
MCH RBC QN AUTO: 29.2 PG (ref 26–35)
MCHC RBC AUTO-ENTMCNC: 31.7 % (ref 32–34.5)
MCV RBC AUTO: 92 FL (ref 80–99.9)
MICROALBUMIN UR-MCNC: 29.4 MG/L
PDW BLD-RTO: 15.9 FL (ref 11.5–15)
PLATELET # BLD: 274 E9/L (ref 130–450)
PMV BLD AUTO: 10.6 FL (ref 7–12)
POTASSIUM SERPL-SCNC: 4.6 MMOL/L (ref 3.5–5)
RBC # BLD: 4.52 E12/L (ref 3.5–5.5)
SODIUM BLD-SCNC: 137 MMOL/L (ref 132–146)
TOTAL PROTEIN: 8.1 G/DL (ref 6.4–8.3)
TRIGL SERPL-MCNC: 249 MG/DL (ref 0–149)
VLDLC SERPL CALC-MCNC: 50 MG/DL
WBC # BLD: 7.3 E9/L (ref 4.5–11.5)

## 2018-09-10 PROCEDURE — 82044 UR ALBUMIN SEMIQUANTITATIVE: CPT

## 2018-09-10 PROCEDURE — 80053 COMPREHEN METABOLIC PANEL: CPT

## 2018-09-10 PROCEDURE — 80061 LIPID PANEL: CPT

## 2018-09-10 PROCEDURE — 83036 HEMOGLOBIN GLYCOSYLATED A1C: CPT

## 2018-09-10 PROCEDURE — 36415 COLL VENOUS BLD VENIPUNCTURE: CPT

## 2018-09-10 PROCEDURE — 77080 DXA BONE DENSITY AXIAL: CPT

## 2018-09-10 PROCEDURE — 85027 COMPLETE CBC AUTOMATED: CPT

## 2018-09-18 ENCOUNTER — OFFICE VISIT (OUTPATIENT)
Dept: PRIMARY CARE CLINIC | Age: 83
End: 2018-09-18
Payer: COMMERCIAL

## 2018-09-18 VITALS
DIASTOLIC BLOOD PRESSURE: 70 MMHG | BODY MASS INDEX: 41.21 KG/M2 | HEART RATE: 76 BPM | SYSTOLIC BLOOD PRESSURE: 132 MMHG | TEMPERATURE: 96.2 F | WEIGHT: 271 LBS

## 2018-09-18 DIAGNOSIS — E03.9 ACQUIRED HYPOTHYROIDISM: ICD-10-CM

## 2018-09-18 DIAGNOSIS — I48.91 ATRIAL FIBRILLATION, UNSPECIFIED TYPE (HCC): ICD-10-CM

## 2018-09-18 DIAGNOSIS — Z23 NEED FOR PROPHYLACTIC VACCINATION AGAINST DIPHTHERIA-TETANUS-PERTUSSIS (DTP): Primary | ICD-10-CM

## 2018-09-18 DIAGNOSIS — Z79.4 TYPE 2 DIABETES MELLITUS WITHOUT COMPLICATION, WITH LONG-TERM CURRENT USE OF INSULIN (HCC): ICD-10-CM

## 2018-09-18 DIAGNOSIS — I10 ESSENTIAL HYPERTENSION: ICD-10-CM

## 2018-09-18 DIAGNOSIS — E78.2 MIXED HYPERLIPIDEMIA: ICD-10-CM

## 2018-09-18 DIAGNOSIS — E11.9 TYPE 2 DIABETES MELLITUS WITHOUT COMPLICATION, WITH LONG-TERM CURRENT USE OF INSULIN (HCC): ICD-10-CM

## 2018-09-18 LAB — INTERNATIONAL NORMALIZATION RATIO, POC: 3.1

## 2018-09-18 PROCEDURE — G0008 ADMIN INFLUENZA VIRUS VAC: HCPCS | Performed by: FAMILY MEDICINE

## 2018-09-18 PROCEDURE — 90662 IIV NO PRSV INCREASED AG IM: CPT | Performed by: FAMILY MEDICINE

## 2018-09-18 PROCEDURE — 85610 PROTHROMBIN TIME: CPT | Performed by: FAMILY MEDICINE

## 2018-09-18 PROCEDURE — 99214 OFFICE O/P EST MOD 30 MIN: CPT | Performed by: FAMILY MEDICINE

## 2018-09-18 RX ORDER — GABAPENTIN 300 MG/1
300 CAPSULE ORAL 3 TIMES DAILY
Qty: 270 CAPSULE | Refills: 0 | Status: SHIPPED
Start: 2018-09-18 | End: 2020-01-01 | Stop reason: ALTCHOICE

## 2018-09-18 ASSESSMENT — ENCOUNTER SYMPTOMS
NAUSEA: 0
BLURRED VISION: 0
HEMOPTYSIS: 0
EYE DISCHARGE: 0
SHORTNESS OF BREATH: 0
BLOOD IN STOOL: 0
ABDOMINAL PAIN: 0
ORTHOPNEA: 0

## 2018-09-18 NOTE — PROGRESS NOTES
and hematuria. Musculoskeletal: Negative for myalgias and neck pain. Skin: Negative for rash. Neurological: Negative for dizziness, focal weakness and seizures. Endo/Heme/Allergies: Negative for polydipsia. Does not bruise/bleed easily. Psychiatric/Behavioral: Negative for depression, hallucinations and suicidal ideas. OBJECTIVE:     VS:  Wt Readings from Last 3 Encounters:   09/18/18 271 lb (122.9 kg)   08/10/18 275 lb (124.7 kg)   07/20/18 275 lb (124.7 kg)     Temp Readings from Last 3 Encounters:   09/18/18 96.2 °F (35.7 °C)   08/31/18 97.7 °F (36.5 °C) (Oral)   08/10/18 97.7 °F (36.5 °C) (Oral)     BP Readings from Last 3 Encounters:   09/18/18 132/70   08/31/18 (!) 110/58   08/10/18 130/68        Physical Exam   Constitutional: She is oriented to person, place, and time. She appears well-developed and well-nourished. HENT:   Head: Normocephalic and atraumatic. Mouth/Throat: Oropharynx is clear and moist.   Eyes: Pupils are equal, round, and reactive to light. Conjunctivae are normal.   Neck: Normal range of motion. Neck supple. Cardiovascular: Normal rate, regular rhythm, normal heart sounds and intact distal pulses. Pulmonary/Chest: Effort normal and breath sounds normal.   Abdominal: Soft. Bowel sounds are normal.   Musculoskeletal: Normal range of motion. Neurological: She is alert and oriented to person, place, and time. Skin: Skin is warm and dry.    Psychiatric: Thought content normal.          Labs :    Lab Results   Component Value Date    WBC 7.3 09/10/2018    HGB 13.2 09/10/2018    HCT 41.6 09/10/2018     09/10/2018    CHOL 165 09/10/2018    TRIG 249 (H) 09/10/2018    HDL 55 09/10/2018    ALT 19 09/10/2018    AST 28 09/10/2018     09/10/2018    K 4.6 09/10/2018    CL 98 09/10/2018    CREATININE 1.0 09/10/2018    BUN 27 (H) 09/10/2018    CO2 27 09/10/2018    TSH 0.174 06/29/2018    INR 3.1 09/18/2018    LABA1C 6.6 (H) 09/10/2018    LABMICR 29.4 (H) Martha Torres.     Electronically signed by Robson Lucio MD on 9/18/18 at 2:58 PM

## 2018-09-18 NOTE — PATIENT INSTRUCTIONS
Continue the medication as before  Hold the Coumadin for 2 days  Continue low-sodium low-fat diabetic diet  Regular exercises  Follow with the wound care  Return to clinic earlier if any problems

## 2018-09-28 ENCOUNTER — HOSPITAL ENCOUNTER (OUTPATIENT)
Dept: WOUND CARE | Age: 83
Discharge: HOME | End: 2018-09-28
Payer: COMMERCIAL

## 2018-09-28 VITALS
DIASTOLIC BLOOD PRESSURE: 50 MMHG | HEART RATE: 75 BPM | HEIGHT: 68 IN | BODY MASS INDEX: 41.07 KG/M2 | WEIGHT: 271 LBS | RESPIRATION RATE: 18 BRPM | TEMPERATURE: 97.6 F | SYSTOLIC BLOOD PRESSURE: 118 MMHG

## 2018-09-28 PROCEDURE — 11042 DBRDMT SUBQ TIS 1ST 20SQCM/<: CPT | Performed by: FAMILY MEDICINE

## 2018-09-28 PROCEDURE — 87075 CULTR BACTERIA EXCEPT BLOOD: CPT

## 2018-09-28 PROCEDURE — 87070 CULTURE OTHR SPECIMN AEROBIC: CPT

## 2018-09-28 PROCEDURE — 11042 DBRDMT SUBQ TIS 1ST 20SQCM/<: CPT

## 2018-09-28 ASSESSMENT — PAIN DESCRIPTION - LOCATION: LOCATION: BUTTOCKS

## 2018-09-28 ASSESSMENT — PAIN DESCRIPTION - PAIN TYPE: TYPE: CHRONIC PAIN

## 2018-09-28 ASSESSMENT — PAIN DESCRIPTION - PROGRESSION: CLINICAL_PROGRESSION: NOT CHANGED

## 2018-09-28 ASSESSMENT — PAIN DESCRIPTION - FREQUENCY: FREQUENCY: INTERMITTENT

## 2018-09-28 ASSESSMENT — PAIN DESCRIPTION - DESCRIPTORS: DESCRIPTORS: STABBING

## 2018-09-28 ASSESSMENT — PAIN DESCRIPTION - ORIENTATION: ORIENTATION: POSTERIOR

## 2018-09-28 ASSESSMENT — PAIN DESCRIPTION - ONSET: ONSET: ON-GOING

## 2018-09-28 ASSESSMENT — PAIN SCALES - GENERAL: PAINLEVEL_OUTOF10: 3

## 2018-09-28 NOTE — PROGRESS NOTES
Follow-Up Wound Progress Note  Marychuy Pace  AGE: 80 y.o. GENDER: female  DOD: 1935  TODAY'S DATE:  9/28/18  Subjective:    Marychuy Pace is a 80 y.o. female who presents today for wound check. Wound Etiology :  pressure ulcer: Stage III  Wound Location :  on right and sacral and over the Right SI area, and this probably represents a chronic Osteomyelitis. Pain : {2/10     Abx : Absent       Overall Wound Assessment  Wound is is unchanged. Size has unchanged    Objective:    BP (!) 118/50   Pulse 75   Temp 97.6 °F (36.4 °C)   Resp 18   Ht 5' 8\" (1.727 m)   Wt 271 lb (122.9 kg)   BMI 41.21 kg/m²   Pressure Ulcer 01/17/13 Sacrum Inner Stage III new wound #2 pressure, sacral stage 3 (Active)   Sirisha-wound Assessment Intact 9/28/2018  9:42 AM   Sirisha-Wound Moisture Intact 8/31/2018 10:38 AM   Pressure Ulcer Staging Stage III 8/12/2013 12:36 PM   Wound Assessment Red 9/28/2018  9:42 AM   Wound Length (cm) 1 cm 9/28/2018  9:42 AM   Wound Width (cm) 0.2 cm 9/28/2018  9:42 AM   Wound Depth (cm)  0.6 9/28/2018  9:42 AM   Calculated Wound Size (cm^2) (l*w) 0.2 cm^2 9/28/2018  9:42 AM   Change in Wound Size % (l*w) -100 9/28/2018  9:42 AM   Culture Taken Yes 10/12/2017 11:07 AM   Dressing Status New drainage; Changed 8/31/2018 11:30 AM   Dressing Changed Changed/New 8/31/2018 11:30 AM   Dressing/Treatment 4x4;Alginate;Silver dressing 8/10/2018 10:36 AM   Wound Cleansed Rinsed/Irrigated with saline 8/31/2018 11:30 AM   Dressing Change Due 02/23/18 2/22/2018 10:39 AM   Drainage Amount None 9/28/2018  9:42 AM   Drainage Description Yellow 5/18/2018 10:36 AM   Odor None 8/10/2018 10:22 AM   [place]%Wound Bed 100 1/11/2018  9:36 AM   Red%Wound Bed 100 11/10/2016 10:35 AM   Yellow%Wound Bed 30 9/15/2016 10:03 AM   Debridement per physician Subcutaneous 3/16/2018 10:54 AM   Time out Yes 8/31/2018 11:05 AM   Procedural Pain 5 8/31/2018 11:05 AM   Post procedural Pain 2 8/31/2018 11:05 AM   Number of days: 2079 4. Patient instructions were given. 5. Follow Up in 3 week(s). Genet's age and general medical condition precludes and aggressive surgical therapy.       Riki Murry DO                           9/28/18     9:59 AM

## 2018-09-30 LAB
ANAEROBIC CULTURE: NORMAL
WOUND/ABSCESS: NORMAL

## 2018-10-05 RX ORDER — ALLOPURINOL 100 MG/1
100 TABLET ORAL 2 TIMES DAILY
Qty: 180 TABLET | Refills: 1 | Status: SHIPPED | OUTPATIENT
Start: 2018-10-05

## 2018-10-12 ENCOUNTER — HOSPITAL ENCOUNTER (EMERGENCY)
Age: 83
Discharge: HOME | End: 2018-10-12
Attending: EMERGENCY MEDICINE
Payer: COMMERCIAL

## 2018-10-12 ENCOUNTER — APPOINTMENT (OUTPATIENT)
Dept: CT IMAGING | Age: 83
End: 2018-10-12
Payer: COMMERCIAL

## 2018-10-12 ENCOUNTER — APPOINTMENT (OUTPATIENT)
Dept: GENERAL RADIOLOGY | Age: 83
End: 2018-10-12
Payer: COMMERCIAL

## 2018-10-12 VITALS
HEIGHT: 67 IN | SYSTOLIC BLOOD PRESSURE: 106 MMHG | BODY MASS INDEX: 43.63 KG/M2 | RESPIRATION RATE: 20 BRPM | HEART RATE: 66 BPM | TEMPERATURE: 97.8 F | OXYGEN SATURATION: 96 % | WEIGHT: 278 LBS | DIASTOLIC BLOOD PRESSURE: 60 MMHG

## 2018-10-12 DIAGNOSIS — N30.00 ACUTE CYSTITIS WITHOUT HEMATURIA: ICD-10-CM

## 2018-10-12 DIAGNOSIS — M54.50 ACUTE BILATERAL LOW BACK PAIN WITHOUT SCIATICA: Primary | ICD-10-CM

## 2018-10-12 LAB
ALBUMIN SERPL-MCNC: 3.2 G/DL (ref 3.5–5.2)
ALP BLD-CCNC: 111 U/L (ref 35–104)
ALT SERPL-CCNC: 14 U/L (ref 0–32)
ANION GAP SERPL CALCULATED.3IONS-SCNC: 12 MMOL/L (ref 7–16)
AST SERPL-CCNC: 22 U/L (ref 0–31)
BACTERIA: ABNORMAL /HPF
BASOPHILS ABSOLUTE: 0.05 E9/L (ref 0–0.2)
BASOPHILS RELATIVE PERCENT: 0.5 % (ref 0–2)
BILIRUB SERPL-MCNC: 0.8 MG/DL (ref 0–1.2)
BILIRUBIN URINE: NEGATIVE
BLOOD, URINE: NEGATIVE
BUN BLDV-MCNC: 37 MG/DL (ref 8–23)
CALCIUM SERPL-MCNC: 8.8 MG/DL (ref 8.6–10.2)
CHLORIDE BLD-SCNC: 101 MMOL/L (ref 98–107)
CHP ED QC CHECK: YES
CLARITY: ABNORMAL
CO2: 23 MMOL/L (ref 22–29)
COLOR: YELLOW
CREAT SERPL-MCNC: 0.9 MG/DL (ref 0.5–1)
EOSINOPHILS ABSOLUTE: 0.35 E9/L (ref 0.05–0.5)
EOSINOPHILS RELATIVE PERCENT: 3.8 % (ref 0–6)
EPITHELIAL CELLS, UA: ABNORMAL /HPF
GFR AFRICAN AMERICAN: >60
GFR NON-AFRICAN AMERICAN: 60 ML/MIN/1.73
GLUCOSE BLD-MCNC: 76 MG/DL (ref 74–109)
GLUCOSE BLD-MCNC: 82 MG/DL
GLUCOSE URINE: NEGATIVE MG/DL
HCT VFR BLD CALC: 38.2 % (ref 34–48)
HEMOGLOBIN: 12.3 G/DL (ref 11.5–15.5)
IMMATURE GRANULOCYTES #: 0.03 E9/L
IMMATURE GRANULOCYTES %: 0.3 % (ref 0–5)
INR BLD: 1.8
KETONES, URINE: NEGATIVE MG/DL
LACTIC ACID: 1 MMOL/L (ref 0.5–2.2)
LEUKOCYTE ESTERASE, URINE: ABNORMAL
LIPASE: 12 U/L (ref 13–60)
LYMPHOCYTES ABSOLUTE: 2 E9/L (ref 1.5–4)
LYMPHOCYTES RELATIVE PERCENT: 21.4 % (ref 20–42)
MCH RBC QN AUTO: 29.6 PG (ref 26–35)
MCHC RBC AUTO-ENTMCNC: 32.2 % (ref 32–34.5)
MCV RBC AUTO: 91.8 FL (ref 80–99.9)
METER GLUCOSE: 82 MG/DL (ref 70–110)
MONOCYTES ABSOLUTE: 0.78 E9/L (ref 0.1–0.95)
MONOCYTES RELATIVE PERCENT: 8.4 % (ref 2–12)
NEUTROPHILS ABSOLUTE: 6.12 E9/L (ref 1.8–7.3)
NEUTROPHILS RELATIVE PERCENT: 65.6 % (ref 43–80)
NITRITE, URINE: POSITIVE
PDW BLD-RTO: 16.2 FL (ref 11.5–15)
PH UA: 5.5 (ref 5–9)
PLATELET # BLD: 220 E9/L (ref 130–450)
PMV BLD AUTO: 10.8 FL (ref 7–12)
POTASSIUM SERPL-SCNC: 3.6 MMOL/L (ref 3.5–5)
PRO-BNP: 804 PG/ML (ref 0–450)
PROTEIN UA: NEGATIVE MG/DL
PROTHROMBIN TIME: 19.6 SEC (ref 9.3–12.4)
RBC # BLD: 4.16 E12/L (ref 3.5–5.5)
RBC UA: ABNORMAL /HPF (ref 0–2)
SODIUM BLD-SCNC: 136 MMOL/L (ref 132–146)
SPECIFIC GRAVITY UA: 1.01 (ref 1–1.03)
TOTAL PROTEIN: 7.4 G/DL (ref 6.4–8.3)
TROPONIN: <0.01 NG/ML (ref 0–0.03)
TSH SERPL DL<=0.05 MIU/L-ACNC: 1.08 UIU/ML (ref 0.27–4.2)
UROBILINOGEN, URINE: 0.2 E.U./DL
WBC # BLD: 9.3 E9/L (ref 4.5–11.5)
WBC UA: ABNORMAL /HPF (ref 0–5)

## 2018-10-12 PROCEDURE — 99285 EMERGENCY DEPT VISIT HI MDM: CPT

## 2018-10-12 PROCEDURE — 36415 COLL VENOUS BLD VENIPUNCTURE: CPT

## 2018-10-12 PROCEDURE — 83690 ASSAY OF LIPASE: CPT

## 2018-10-12 PROCEDURE — 81001 URINALYSIS AUTO W/SCOPE: CPT

## 2018-10-12 PROCEDURE — 6360000002 HC RX W HCPCS: Performed by: EMERGENCY MEDICINE

## 2018-10-12 PROCEDURE — 71045 X-RAY EXAM CHEST 1 VIEW: CPT

## 2018-10-12 PROCEDURE — 83880 ASSAY OF NATRIURETIC PEPTIDE: CPT

## 2018-10-12 PROCEDURE — 83605 ASSAY OF LACTIC ACID: CPT

## 2018-10-12 PROCEDURE — 6360000004 HC RX CONTRAST MEDICATION: Performed by: RADIOLOGY

## 2018-10-12 PROCEDURE — 85025 COMPLETE CBC W/AUTO DIFF WBC: CPT

## 2018-10-12 PROCEDURE — 85610 PROTHROMBIN TIME: CPT

## 2018-10-12 PROCEDURE — 96374 THER/PROPH/DIAG INJ IV PUSH: CPT

## 2018-10-12 PROCEDURE — 84484 ASSAY OF TROPONIN QUANT: CPT

## 2018-10-12 PROCEDURE — 93005 ELECTROCARDIOGRAM TRACING: CPT | Performed by: EMERGENCY MEDICINE

## 2018-10-12 PROCEDURE — 80053 COMPREHEN METABOLIC PANEL: CPT

## 2018-10-12 PROCEDURE — 82962 GLUCOSE BLOOD TEST: CPT

## 2018-10-12 PROCEDURE — 6370000000 HC RX 637 (ALT 250 FOR IP): Performed by: EMERGENCY MEDICINE

## 2018-10-12 PROCEDURE — 84443 ASSAY THYROID STIM HORMONE: CPT

## 2018-10-12 PROCEDURE — 74177 CT ABD & PELVIS W/CONTRAST: CPT

## 2018-10-12 RX ORDER — CEFDINIR 300 MG/1
300 CAPSULE ORAL ONCE
Status: COMPLETED | OUTPATIENT
Start: 2018-10-12 | End: 2018-10-12

## 2018-10-12 RX ORDER — CEFDINIR 300 MG/1
300 CAPSULE ORAL 2 TIMES DAILY
Qty: 20 CAPSULE | Refills: 0 | Status: SHIPPED | OUTPATIENT
Start: 2018-10-12 | End: 2018-10-22

## 2018-10-12 RX ORDER — FENTANYL CITRATE 50 UG/ML
50 INJECTION, SOLUTION INTRAMUSCULAR; INTRAVENOUS ONCE
Status: COMPLETED | OUTPATIENT
Start: 2018-10-12 | End: 2018-10-12

## 2018-10-12 RX ORDER — TRAMADOL HYDROCHLORIDE 50 MG/1
50 TABLET ORAL EVERY 6 HOURS PRN
Qty: 12 TABLET | Refills: 0 | Status: SHIPPED | OUTPATIENT
Start: 2018-10-12 | End: 2018-10-15

## 2018-10-12 RX ORDER — TRAMADOL HYDROCHLORIDE 50 MG/1
50 TABLET ORAL EVERY 6 HOURS PRN
Qty: 12 TABLET | Refills: 0 | Status: SHIPPED | OUTPATIENT
Start: 2018-10-12 | End: 2018-10-12

## 2018-10-12 RX ADMIN — FENTANYL CITRATE 50 MCG: 50 INJECTION, SOLUTION INTRAMUSCULAR; INTRAVENOUS at 04:25

## 2018-10-12 RX ADMIN — CEFDINIR 300 MG: 300 CAPSULE ORAL at 08:47

## 2018-10-12 RX ADMIN — IOHEXOL 50 ML: 240 INJECTION, SOLUTION INTRATHECAL; INTRAVASCULAR; INTRAVENOUS; ORAL at 07:56

## 2018-10-12 RX ADMIN — IOPAMIDOL 80 ML: 755 INJECTION, SOLUTION INTRAVENOUS at 07:56

## 2018-10-12 ASSESSMENT — PAIN DESCRIPTION - DESCRIPTORS
DESCRIPTORS: ACHING
DESCRIPTORS: ACHING

## 2018-10-12 ASSESSMENT — ENCOUNTER SYMPTOMS
SHORTNESS OF BREATH: 0
BACK PAIN: 1
DIARRHEA: 0
ABDOMINAL PAIN: 1
EYE REDNESS: 0
ABDOMINAL DISTENTION: 0
EYE PAIN: 0
VOMITING: 0
WHEEZING: 0
NAUSEA: 0
EYE DISCHARGE: 0
SORE THROAT: 0
SINUS PRESSURE: 0
COUGH: 0

## 2018-10-12 ASSESSMENT — PAIN DESCRIPTION - PROGRESSION
CLINICAL_PROGRESSION: GRADUALLY IMPROVING
CLINICAL_PROGRESSION: RAPIDLY WORSENING

## 2018-10-12 ASSESSMENT — PAIN DESCRIPTION - PAIN TYPE: TYPE: ACUTE PAIN

## 2018-10-12 ASSESSMENT — PAIN DESCRIPTION - LOCATION
LOCATION: BACK
LOCATION: BACK

## 2018-10-12 ASSESSMENT — PAIN SCALES - GENERAL
PAINLEVEL_OUTOF10: 7
PAINLEVEL_OUTOF10: 10

## 2018-10-12 ASSESSMENT — PAIN DESCRIPTION - FREQUENCY: FREQUENCY: CONTINUOUS

## 2018-10-12 ASSESSMENT — PAIN DESCRIPTION - ORIENTATION: ORIENTATION: RIGHT;LEFT;LOWER

## 2018-10-12 NOTE — ED PROVIDER NOTES
removal; knee surgery; hip surgery; Eye surgery; and Rotator cuff repair (Right). Social History:  reports that she has quit smoking. She has never used smokeless tobacco. She reports that she does not drink alcohol or use drugs. Family History: family history is not on file. The patients home medications have been reviewed.     Allergies: Penicillins and Sulfa antibiotics    -------------------------------------------------- RESULTS -------------------------------------------------  Labs:  Results for orders placed or performed during the hospital encounter of 10/12/18   Urinalysis   Result Value Ref Range    Color, UA Yellow Straw/Yellow    Clarity, UA SLCLOUDY Clear    Glucose, Ur Negative Negative mg/dL    Bilirubin Urine Negative Negative    Ketones, Urine Negative Negative mg/dL    Specific Gravity, UA 1.015 1.005 - 1.030    Blood, Urine Negative Negative    pH, UA 5.5 5.0 - 9.0    Protein, UA Negative Negative mg/dL    Urobilinogen, Urine 0.2 <2.0 E.U./dL    Nitrite, Urine POSITIVE (A) Negative    Leukocyte Esterase, Urine TRACE (A) Negative   CBC auto differential   Result Value Ref Range    WBC 9.3 4.5 - 11.5 E9/L    RBC 4.16 3.50 - 5.50 E12/L    Hemoglobin 12.3 11.5 - 15.5 g/dL    Hematocrit 38.2 34.0 - 48.0 %    MCV 91.8 80.0 - 99.9 fL    MCH 29.6 26.0 - 35.0 pg    MCHC 32.2 32.0 - 34.5 %    RDW 16.2 (H) 11.5 - 15.0 fL    Platelets 795 693 - 376 E9/L    MPV 10.8 7.0 - 12.0 fL    Neutrophils % 65.6 43.0 - 80.0 %    Immature Granulocytes % 0.3 0.0 - 5.0 %    Lymphocytes % 21.4 20.0 - 42.0 %    Monocytes % 8.4 2.0 - 12.0 %    Eosinophils % 3.8 0.0 - 6.0 %    Basophils % 0.5 0.0 - 2.0 %    Neutrophils # 6.12 1.80 - 7.30 E9/L    Immature Granulocytes # 0.03 E9/L    Lymphocytes # 2.00 1.50 - 4.00 E9/L    Monocytes # 0.78 0.10 - 0.95 E9/L    Eosinophils # 0.35 0.05 - 0.50 E9/L    Basophils # 0.05 0.00 - 0.20 E9/L   Comprehensive Metabolic Panel   Result Value Ref Range    Sodium 136 132 - 146 mmol/L 97.8 °F (36.6 °C) (Oral)   Resp 20   Ht 5' 7\" (1.702 m)   Wt 278 lb (126.1 kg)   SpO2 96%   BMI 43.54 kg/m²   Oxygen Saturation Interpretation: Normal      ------------------------------------------ PROGRESS NOTES ------------------------------------------  8:46 AM  I have spoken with the patient and discussed todays results, in addition to providing specific details for the plan of care and counseling regarding the diagnosis and prognosis. Their questions are answered at this time and they are agreeable with the plan. I discussed at length with them reasons for immediate return here for re evaluation. They will followup with their primary care physician by calling their office today. She denies SOB, SPO2 is in the ines 90s on room air.       --------------------------------- ADDITIONAL PROVIDER NOTES ---------------------------------  At this time the patient is without objective evidence of an acute process requiring hospitalization or inpatient management. They have remained hemodynamically stable throughout their entire ED visit and are stable for discharge with outpatient follow-up. The plan has been discussed in detail and they are aware of the specific conditions for emergent return, as well as the importance of follow-up. New Prescriptions    CEFDINIR (OMNICEF) 300 MG CAPSULE    Take 1 capsule by mouth 2 times daily for 10 days    TRAMADOL (ULTRAM) 50 MG TABLET    Take 1 tablet by mouth every 6 hours as needed for Pain for up to 3 days. Intended supply: 3 days. Take lowest dose possible to manage pain. Diagnosis:  1. Acute bilateral low back pain without sciatica    2. Acute cystitis without hematuria        Disposition:  Patient's disposition: Discharge to home  Patient's condition is stable.                 Tomy Meyer DO  10/12/18 1004

## 2018-10-15 LAB
EKG ATRIAL RATE: 67 BPM
EKG Q-T INTERVAL: 442 MS
EKG QRS DURATION: 76 MS
EKG QTC CALCULATION (BAZETT): 455 MS
EKG R AXIS: -4 DEGREES
EKG T AXIS: -7 DEGREES
EKG VENTRICULAR RATE: 64 BPM

## 2018-10-19 ENCOUNTER — HOSPITAL ENCOUNTER (OUTPATIENT)
Dept: WOUND CARE | Age: 83
Discharge: HOME | End: 2018-10-19
Payer: COMMERCIAL

## 2018-10-19 ENCOUNTER — APPOINTMENT (OUTPATIENT)
Dept: GENERAL RADIOLOGY | Age: 83
End: 2018-10-19
Payer: COMMERCIAL

## 2018-10-19 ENCOUNTER — HOSPITAL ENCOUNTER (EMERGENCY)
Age: 83
Discharge: HOME | End: 2018-10-19
Attending: EMERGENCY MEDICINE
Payer: COMMERCIAL

## 2018-10-19 VITALS
TEMPERATURE: 97.6 F | DIASTOLIC BLOOD PRESSURE: 68 MMHG | BODY MASS INDEX: 43.63 KG/M2 | HEIGHT: 67 IN | SYSTOLIC BLOOD PRESSURE: 100 MMHG | HEART RATE: 58 BPM | RESPIRATION RATE: 20 BRPM | WEIGHT: 278 LBS

## 2018-10-19 VITALS
OXYGEN SATURATION: 98 % | RESPIRATION RATE: 16 BRPM | BODY MASS INDEX: 41.68 KG/M2 | TEMPERATURE: 97.6 F | HEIGHT: 68 IN | SYSTOLIC BLOOD PRESSURE: 119 MMHG | HEART RATE: 58 BPM | WEIGHT: 275 LBS | DIASTOLIC BLOOD PRESSURE: 71 MMHG

## 2018-10-19 DIAGNOSIS — G89.29 CHRONIC BILATERAL LOW BACK PAIN WITHOUT SCIATICA: Primary | ICD-10-CM

## 2018-10-19 DIAGNOSIS — M54.50 CHRONIC BILATERAL LOW BACK PAIN WITHOUT SCIATICA: Primary | ICD-10-CM

## 2018-10-19 DIAGNOSIS — E66.01 MORBID OBESITY (HCC): ICD-10-CM

## 2018-10-19 DIAGNOSIS — K59.00 CONSTIPATION, UNSPECIFIED CONSTIPATION TYPE: ICD-10-CM

## 2018-10-19 DIAGNOSIS — N28.9 MILD RENAL INSUFFICIENCY: ICD-10-CM

## 2018-10-19 LAB
ALBUMIN SERPL-MCNC: 3.8 G/DL (ref 3.5–5.2)
ALP BLD-CCNC: 134 U/L (ref 35–104)
ALT SERPL-CCNC: 14 U/L (ref 0–32)
ANION GAP SERPL CALCULATED.3IONS-SCNC: 15 MMOL/L (ref 7–16)
APTT: 37.4 SEC (ref 24.5–35.1)
AST SERPL-CCNC: 35 U/L (ref 0–31)
BASOPHILS ABSOLUTE: 0.08 E9/L (ref 0–0.2)
BASOPHILS RELATIVE PERCENT: 0.9 % (ref 0–2)
BILIRUB SERPL-MCNC: 0.6 MG/DL (ref 0–1.2)
BILIRUBIN URINE: NEGATIVE
BLOOD, URINE: NEGATIVE
BUN BLDV-MCNC: 40 MG/DL (ref 8–23)
C-REACTIVE PROTEIN: 4 MG/DL (ref 0–0.4)
CALCIUM SERPL-MCNC: 9.4 MG/DL (ref 8.6–10.2)
CHLORIDE BLD-SCNC: 98 MMOL/L (ref 98–107)
CLARITY: CLEAR
CO2: 27 MMOL/L (ref 22–29)
COLOR: YELLOW
CREAT SERPL-MCNC: 1.2 MG/DL (ref 0.5–1)
EKG ATRIAL RATE: 68 BPM
EKG Q-T INTERVAL: 450 MS
EKG QRS DURATION: 80 MS
EKG QTC CALCULATION (BAZETT): 445 MS
EKG T AXIS: 12 DEGREES
EKG VENTRICULAR RATE: 59 BPM
EOSINOPHILS ABSOLUTE: 0.23 E9/L (ref 0.05–0.5)
EOSINOPHILS RELATIVE PERCENT: 2.6 % (ref 0–6)
GFR AFRICAN AMERICAN: 52
GFR NON-AFRICAN AMERICAN: 43 ML/MIN/1.73
GLUCOSE BLD-MCNC: 88 MG/DL (ref 74–109)
GLUCOSE URINE: NEGATIVE MG/DL
HCT VFR BLD CALC: 38.7 % (ref 34–48)
HEMOGLOBIN: 12.5 G/DL (ref 11.5–15.5)
IMMATURE GRANULOCYTES #: 0.05 E9/L
IMMATURE GRANULOCYTES %: 0.6 % (ref 0–5)
INR BLD: 2
KETONES, URINE: NEGATIVE MG/DL
LACTIC ACID, SEPSIS: 1.1 MMOL/L (ref 0.5–1.9)
LEUKOCYTE ESTERASE, URINE: NEGATIVE
LIPASE: 10 U/L (ref 13–60)
LYMPHOCYTES ABSOLUTE: 1.88 E9/L (ref 1.5–4)
LYMPHOCYTES RELATIVE PERCENT: 21.2 % (ref 20–42)
MCH RBC QN AUTO: 29.1 PG (ref 26–35)
MCHC RBC AUTO-ENTMCNC: 32.3 % (ref 32–34.5)
MCV RBC AUTO: 90.2 FL (ref 80–99.9)
MONOCYTES ABSOLUTE: 1.05 E9/L (ref 0.1–0.95)
MONOCYTES RELATIVE PERCENT: 11.8 % (ref 2–12)
NEUTROPHILS ABSOLUTE: 5.58 E9/L (ref 1.8–7.3)
NEUTROPHILS RELATIVE PERCENT: 62.9 % (ref 43–80)
NITRITE, URINE: NEGATIVE
PDW BLD-RTO: 16.1 FL (ref 11.5–15)
PH UA: 5.5 (ref 5–9)
PLATELET # BLD: 315 E9/L (ref 130–450)
PMV BLD AUTO: 11.1 FL (ref 7–12)
POTASSIUM REFLEX MAGNESIUM: 4.4 MMOL/L (ref 3.5–5)
PRO-BNP: 885 PG/ML (ref 0–450)
PROTEIN UA: NEGATIVE MG/DL
PROTHROMBIN TIME: 23 SEC (ref 9.3–12.4)
RBC # BLD: 4.29 E12/L (ref 3.5–5.5)
SEDIMENTATION RATE, ERYTHROCYTE: 83 MM/HR (ref 0–20)
SODIUM BLD-SCNC: 140 MMOL/L (ref 132–146)
SPECIFIC GRAVITY UA: 1.01 (ref 1–1.03)
TOTAL PROTEIN: 8.4 G/DL (ref 6.4–8.3)
TROPONIN: 0.02 NG/ML (ref 0–0.03)
UROBILINOGEN, URINE: 0.2 E.U./DL
WBC # BLD: 8.9 E9/L (ref 4.5–11.5)

## 2018-10-19 PROCEDURE — 96374 THER/PROPH/DIAG INJ IV PUSH: CPT

## 2018-10-19 PROCEDURE — 6360000002 HC RX W HCPCS: Performed by: NURSE PRACTITIONER

## 2018-10-19 PROCEDURE — 85025 COMPLETE CBC W/AUTO DIFF WBC: CPT

## 2018-10-19 PROCEDURE — 11042 DBRDMT SUBQ TIS 1ST 20SQCM/<: CPT

## 2018-10-19 PROCEDURE — 83690 ASSAY OF LIPASE: CPT

## 2018-10-19 PROCEDURE — 2580000003 HC RX 258: Performed by: NURSE PRACTITIONER

## 2018-10-19 PROCEDURE — 96375 TX/PRO/DX INJ NEW DRUG ADDON: CPT

## 2018-10-19 PROCEDURE — 36415 COLL VENOUS BLD VENIPUNCTURE: CPT

## 2018-10-19 PROCEDURE — 83880 ASSAY OF NATRIURETIC PEPTIDE: CPT

## 2018-10-19 PROCEDURE — 74022 RADEX COMPL AQT ABD SERIES: CPT

## 2018-10-19 PROCEDURE — 81003 URINALYSIS AUTO W/O SCOPE: CPT

## 2018-10-19 PROCEDURE — 83605 ASSAY OF LACTIC ACID: CPT

## 2018-10-19 PROCEDURE — 80053 COMPREHEN METABOLIC PANEL: CPT

## 2018-10-19 PROCEDURE — 11042 DBRDMT SUBQ TIS 1ST 20SQCM/<: CPT | Performed by: FAMILY MEDICINE

## 2018-10-19 PROCEDURE — 99284 EMERGENCY DEPT VISIT MOD MDM: CPT

## 2018-10-19 PROCEDURE — 87040 BLOOD CULTURE FOR BACTERIA: CPT

## 2018-10-19 PROCEDURE — 93005 ELECTROCARDIOGRAM TRACING: CPT | Performed by: NURSE PRACTITIONER

## 2018-10-19 PROCEDURE — 86140 C-REACTIVE PROTEIN: CPT

## 2018-10-19 PROCEDURE — 87088 URINE BACTERIA CULTURE: CPT

## 2018-10-19 PROCEDURE — 85651 RBC SED RATE NONAUTOMATED: CPT

## 2018-10-19 PROCEDURE — 85610 PROTHROMBIN TIME: CPT

## 2018-10-19 PROCEDURE — 84484 ASSAY OF TROPONIN QUANT: CPT

## 2018-10-19 PROCEDURE — 6370000000 HC RX 637 (ALT 250 FOR IP): Performed by: NURSE PRACTITIONER

## 2018-10-19 PROCEDURE — 85730 THROMBOPLASTIN TIME PARTIAL: CPT

## 2018-10-19 RX ORDER — 0.9 % SODIUM CHLORIDE 0.9 %
250 INTRAVENOUS SOLUTION INTRAVENOUS ONCE
Status: COMPLETED | OUTPATIENT
Start: 2018-10-19 | End: 2018-10-19

## 2018-10-19 RX ORDER — POLYETHYLENE GLYCOL 3350 17 G/17G
17 POWDER, FOR SOLUTION ORAL DAILY
Qty: 119 G | Refills: 0 | Status: SHIPPED | OUTPATIENT
Start: 2018-10-19 | End: 2018-10-26

## 2018-10-19 RX ORDER — ONDANSETRON 2 MG/ML
4 INJECTION INTRAMUSCULAR; INTRAVENOUS ONCE
Status: COMPLETED | OUTPATIENT
Start: 2018-10-19 | End: 2018-10-19

## 2018-10-19 RX ORDER — FENTANYL CITRATE 50 UG/ML
25 INJECTION, SOLUTION INTRAMUSCULAR; INTRAVENOUS ONCE
Status: COMPLETED | OUTPATIENT
Start: 2018-10-19 | End: 2018-10-19

## 2018-10-19 RX ORDER — TRAMADOL HYDROCHLORIDE 50 MG/1
50 TABLET ORAL EVERY 6 HOURS PRN
COMMUNITY
End: 2019-05-03

## 2018-10-19 RX ADMIN — MAGESIUM CITRATE 300 ML: 1.75 LIQUID ORAL at 15:52

## 2018-10-19 RX ADMIN — SODIUM CHLORIDE 250 ML: 9 INJECTION, SOLUTION INTRAVENOUS at 15:58

## 2018-10-19 RX ADMIN — SODIUM CHLORIDE 250 ML: 9 INJECTION, SOLUTION INTRAVENOUS at 12:57

## 2018-10-19 RX ADMIN — ONDANSETRON 4 MG: 2 INJECTION INTRAMUSCULAR; INTRAVENOUS at 12:57

## 2018-10-19 RX ADMIN — FENTANYL CITRATE 25 MCG: 50 INJECTION INTRAMUSCULAR; INTRAVENOUS at 12:57

## 2018-10-19 ASSESSMENT — PAIN SCALES - GENERAL
PAINLEVEL_OUTOF10: 6
PAINLEVEL_OUTOF10: 7
PAINLEVEL_OUTOF10: 5

## 2018-10-19 ASSESSMENT — PAIN DESCRIPTION - LOCATION
LOCATION: BACK;BUTTOCKS
LOCATION: BACK;BUTTOCKS

## 2018-10-19 ASSESSMENT — PAIN DESCRIPTION - PROGRESSION: CLINICAL_PROGRESSION: GRADUALLY IMPROVING

## 2018-10-19 ASSESSMENT — PAIN DESCRIPTION - PAIN TYPE
TYPE: ACUTE PAIN
TYPE: ACUTE PAIN

## 2018-10-19 ASSESSMENT — PAIN DESCRIPTION - FREQUENCY: FREQUENCY: CONTINUOUS

## 2018-10-19 ASSESSMENT — PAIN DESCRIPTION - ORIENTATION
ORIENTATION: RIGHT;LEFT;LOWER
ORIENTATION: LOWER

## 2018-10-19 ASSESSMENT — PAIN DESCRIPTION - ONSET: ONSET: ON-GOING

## 2018-10-19 ASSESSMENT — PAIN DESCRIPTION - DESCRIPTORS: DESCRIPTORS: ACHING

## 2018-10-19 NOTE — PROGRESS NOTES
(this wound was originally measured on:)            Measurements shown are from today's visit. Assessment:     Please refer to nursing measurements and assessment regarding wound size pre and post debridement. Wound check - Care provided includes removal of existing dressing and visual inspection    Procedure: Debridement Note: Wound 2. AT 0.25 cm sq. The patient was placed in the sitting position. Lidocaine  gauze was applied  at beginning of wound evaluation. An  Excisional Debridement was performed. Using a curette ,  the wound was debrided sharply of all fibrotic, necrotic, and hyperkeratotic tissue, including a layer of surrounding healthy tissue to stimulate epithelization. The wound was excised through the level of the subcutaneous Wound Percentage debrided is 100%. Please refer to Nurses notes for pre and post debridement dimensions. Wound was irrigated with normal saline solution. Bleeding was with a moderate amount of bleeding, and controlled with pressure. Patient tolerated procedure well and was given proper instruction. Diagnosis: pressure ulcer: Stage III                      Patient Active Problem List   Diagnosis Code    Pressure ulcer of coccygeal region, stage 3 ([address] L89.153    Pressure ulcer, sacrum L89.159    Cardiomegaly J74.3    Acute systolic CHF (congestive heart failure) ([facility]) I50.21    Atrial fibrillation ([facility]) I48.91    DM type 2 (diabetes mellitus, type 2) ([address] E11.9    Morbid obesity ([address] E66.01    Type 2 diabetes mellitus, without long-term current use of insulin ([address] E11.9           Plan:      Treatment & Plan: 1.Excisional Debridement                              2. Alginate                              3. Discussed appropriate home care of this wound. 4. Patient instructions were given.                               5. Follow Up in 3 week(s).                                      Nia Hart DO                           10/19/18     3:46 PM

## 2018-10-19 NOTE — ED NOTES
Assisted pt to restroom for urine sample. Unable to collect sample as pt emptied her bladder prior to getting to the restroom. Pt placed in clean dry clothes. Pt re roomed to room 6. EKG at bedside.       Polo Adam RN  10/19/18 0537

## 2018-10-21 LAB — URINE CULTURE, ROUTINE: NORMAL

## 2018-10-24 LAB — BLOOD CULTURE, ROUTINE: NORMAL

## 2018-10-25 ENCOUNTER — OFFICE VISIT (OUTPATIENT)
Dept: PRIMARY CARE CLINIC | Age: 83
End: 2018-10-25
Payer: COMMERCIAL

## 2018-10-25 VITALS
BODY MASS INDEX: 41.66 KG/M2 | HEART RATE: 60 BPM | SYSTOLIC BLOOD PRESSURE: 128 MMHG | TEMPERATURE: 96.6 F | WEIGHT: 274 LBS | DIASTOLIC BLOOD PRESSURE: 76 MMHG

## 2018-10-25 DIAGNOSIS — Z79.4 TYPE 2 DIABETES MELLITUS WITHOUT COMPLICATION, WITH LONG-TERM CURRENT USE OF INSULIN (HCC): ICD-10-CM

## 2018-10-25 DIAGNOSIS — M54.50 ACUTE MIDLINE LOW BACK PAIN WITHOUT SCIATICA: ICD-10-CM

## 2018-10-25 DIAGNOSIS — Z23 NEED FOR PROPHYLACTIC VACCINATION AGAINST DIPHTHERIA-TETANUS-PERTUSSIS (DTP): ICD-10-CM

## 2018-10-25 DIAGNOSIS — Z23 NEED FOR PROPHYLACTIC VACCINATION AND INOCULATION AGAINST VARICELLA: ICD-10-CM

## 2018-10-25 DIAGNOSIS — E11.9 TYPE 2 DIABETES MELLITUS WITHOUT COMPLICATION, WITH LONG-TERM CURRENT USE OF INSULIN (HCC): ICD-10-CM

## 2018-10-25 DIAGNOSIS — E03.9 ACQUIRED HYPOTHYROIDISM: ICD-10-CM

## 2018-10-25 DIAGNOSIS — R13.10 DYSPHAGIA, UNSPECIFIED TYPE: ICD-10-CM

## 2018-10-25 DIAGNOSIS — I48.91 ATRIAL FIBRILLATION, UNSPECIFIED TYPE (HCC): Primary | ICD-10-CM

## 2018-10-25 DIAGNOSIS — I10 ESSENTIAL HYPERTENSION: ICD-10-CM

## 2018-10-25 DIAGNOSIS — E11.42 TYPE 2 DIABETES MELLITUS WITH DIABETIC POLYNEUROPATHY, WITHOUT LONG-TERM CURRENT USE OF INSULIN (HCC): ICD-10-CM

## 2018-10-25 DIAGNOSIS — E78.2 MIXED HYPERLIPIDEMIA: ICD-10-CM

## 2018-10-25 LAB
GLUCOSE BLD-MCNC: 61 MG/DL
INTERNATIONAL NORMALIZATION RATIO, POC: 1.7
PROTHROMBIN TIME, POC: 20.8

## 2018-10-25 PROCEDURE — 82962 GLUCOSE BLOOD TEST: CPT | Performed by: FAMILY MEDICINE

## 2018-10-25 PROCEDURE — 99213 OFFICE O/P EST LOW 20 MIN: CPT | Performed by: FAMILY MEDICINE

## 2018-10-25 PROCEDURE — 85610 PROTHROMBIN TIME: CPT | Performed by: FAMILY MEDICINE

## 2018-10-25 RX ORDER — SIMVASTATIN 20 MG
20 TABLET ORAL DAILY
Qty: 90 TABLET | Refills: 1 | Status: SHIPPED
Start: 2018-10-25 | End: 2020-01-01 | Stop reason: ALTCHOICE

## 2018-10-25 RX ORDER — LEVOTHYROXINE SODIUM 175 UG/1
175 TABLET ORAL DAILY
Qty: 90 TABLET | Refills: 1 | Status: SHIPPED
Start: 2018-10-25 | End: 2020-01-01 | Stop reason: DRUGHIGH

## 2018-10-25 ASSESSMENT — ENCOUNTER SYMPTOMS
BACK PAIN: 1
RESPIRATORY NEGATIVE: 1
CONSTIPATION: 1
ABDOMINAL PAIN: 1
ALLERGIC/IMMUNOLOGIC NEGATIVE: 1
EYES NEGATIVE: 1

## 2018-10-25 NOTE — PROGRESS NOTES
10/19/2018    TSH 1.080 10/12/2018    INR 1.7 10/25/2018    LABA1C 6.6 (H) 09/10/2018    LABMICR 29.4 (H) 09/10/2018     Lab Results   Component Value Date    COLORU Yellow 10/19/2018    NITRU Negative 10/19/2018    GLUCOSEU Negative 10/19/2018    KETUA Negative 10/19/2018    UROBILINOGEN 0.2 10/19/2018    BILIRUBINUR Negative 10/19/2018     No results found for: PSA, CEA, , [id number],       Controlled Substances Monitoring:                                    ASSESSMENT     Patient Active Problem List    Diagnosis Date Noted    Type 2 diabetes mellitus, without long-term current use of insulin ([facility] 03/05/2018    Morbid obesity ([address] 91/58/4620    Acute systolic CHF (congestive heart failure) ([address] 02/13/2016    Atrial fibrillation ([facility] 02/13/2016    DM type 2 (diabetes mellitus, type 2) ([facility] 02/13/2016    Cardiomegaly 02/12/2016    Pressure ulcer of coccygeal region, stage 3 ([address] 01/23/2014    Pressure ulcer, sacrum 01/23/2014        Diagnosis:     ICD-10-CM    1. Atrial fibrillation, unspecified type ([address] I48.91 POCT INR   2. Need for prophylactic vaccination against diphtheria-tetanus-pertussis (DTP) Z23    3. Need for prophylactic vaccination and inoculation against varicella Z23    4. Type 2 diabetes mellitus without complication, with long-term current use of insulin (HCC) E11.9     Z79.4    5. Mixed hyperlipidemia E78.2    6. Essential hypertension I10    7. Acquired hypothyroidism E03.9    8. Type 2 diabetes mellitus with diabetic polyneuropathy, without long-term current use of insulin (HCC) E11.42    9. Acute midline low back pain without sciatica M54.5 XR LUMBAR SPINE (2-3 VIEWS)       PLAN:   Continue present treatment  Same dose of Coumadin  X-ray of the LS-spine  Return to The clinic after the x-ray  Return to clinic earlier if any problems          Patient Instructions   Continue same dose of Coumadin  .  X-ray of the spine   Continue low-sodium low-fat diabetic diet  Return The clinic earlier if any problems  Monitor the blood sugars        Return in about 1 week (around 11/1/2018). Felix Young reviewed my findings and recommendations with Cora Sandhoff.     Electronicallysigned by Justin Rubio MD on 10/25/18 at 2:16 PM

## 2018-10-29 ENCOUNTER — CARE COORDINATION (OUTPATIENT)
Dept: CARE COORDINATION | Age: 83
End: 2018-10-29

## 2018-10-30 ENCOUNTER — HOSPITAL ENCOUNTER (OUTPATIENT)
Dept: GENERAL RADIOLOGY | Age: 83
Discharge: HOME | End: 2018-11-01
Payer: COMMERCIAL

## 2018-10-30 ENCOUNTER — HOSPITAL ENCOUNTER (OUTPATIENT)
Age: 83
Discharge: HOME | End: 2018-11-01
Payer: COMMERCIAL

## 2018-10-30 DIAGNOSIS — M54.50 ACUTE MIDLINE LOW BACK PAIN WITHOUT SCIATICA: ICD-10-CM

## 2018-10-30 PROCEDURE — 72100 X-RAY EXAM L-S SPINE 2/3 VWS: CPT

## 2018-11-01 ENCOUNTER — TELEPHONE (OUTPATIENT)
Dept: PRIMARY CARE CLINIC | Age: 83
End: 2018-11-01

## 2018-11-01 ENCOUNTER — OFFICE VISIT (OUTPATIENT)
Dept: PRIMARY CARE CLINIC | Age: 83
End: 2018-11-01
Payer: COMMERCIAL

## 2018-11-01 VITALS
TEMPERATURE: 96.8 F | DIASTOLIC BLOOD PRESSURE: 62 MMHG | SYSTOLIC BLOOD PRESSURE: 120 MMHG | BODY MASS INDEX: 40.75 KG/M2 | HEART RATE: 76 BPM | WEIGHT: 268 LBS

## 2018-11-01 DIAGNOSIS — M54.50 ACUTE MIDLINE LOW BACK PAIN WITHOUT SCIATICA: ICD-10-CM

## 2018-11-01 DIAGNOSIS — E78.2 MIXED HYPERLIPIDEMIA: ICD-10-CM

## 2018-11-01 DIAGNOSIS — S32.019A CLOSED FRACTURE OF FIRST LUMBAR VERTEBRA, UNSPECIFIED FRACTURE MORPHOLOGY, INITIAL ENCOUNTER (HCC): ICD-10-CM

## 2018-11-01 DIAGNOSIS — E03.9 ACQUIRED HYPOTHYROIDISM: ICD-10-CM

## 2018-11-01 DIAGNOSIS — I10 ESSENTIAL HYPERTENSION: ICD-10-CM

## 2018-11-01 DIAGNOSIS — Z79.4 TYPE 2 DIABETES MELLITUS WITHOUT COMPLICATION, WITH LONG-TERM CURRENT USE OF INSULIN (HCC): Primary | ICD-10-CM

## 2018-11-01 DIAGNOSIS — E11.9 TYPE 2 DIABETES MELLITUS WITHOUT COMPLICATION, WITH LONG-TERM CURRENT USE OF INSULIN (HCC): Primary | ICD-10-CM

## 2018-11-01 PROCEDURE — 99213 OFFICE O/P EST LOW 20 MIN: CPT | Performed by: FAMILY MEDICINE

## 2018-11-01 ASSESSMENT — ENCOUNTER SYMPTOMS
BACK PAIN: 1
CONSTIPATION: 1
EYES NEGATIVE: 1
ALLERGIC/IMMUNOLOGIC NEGATIVE: 1
RESPIRATORY NEGATIVE: 1

## 2018-11-01 NOTE — PROGRESS NOTES
Date Noted    Type 2 diabetes mellitus, without long-term current use of insulin [facility] 03/05/2018    Morbid obesity ([facility] 94/94/4658    Acute systolic CHF (congestive heart failure) ([facility] 02/13/2016    Atrial fibrillation ([facility] 02/13/2016    DM type 2 (diabetes mellitus, type 2) ([facility] 02/13/2016    Cardiomegaly 02/12/2016    Pressure ulcer of coccygeal region, stage 3 ([facility] 01/23/2014    Pressure ulcer, sacrum 01/23/2014        Diagnosis:     ICD-10-CM    1. Type 2 diabetes mellitus without complication, with long-term current use of insulin ([facility]) E11.9     Z79.4    2. Mixed hyperlipidemia E78.2    3. Essential hypertension I10    4. Acquired hypothyroidism E03.9    5. Acute midline low back pain without sciatica M54.5    6. Closed fracture of first lumbar vertebra, unspecified fracture morphology, initial encounter ([facility] S32.019A MRI LUMBAR SPINE W WO CONTRAST       PLAN:      continue present treatment  We'll order MRI of the back  Discuss with the family  Return to clinic earlier if any problems   refer to the neurosurgeon after the MRI        Patient Instructions   Continue present treatment  We'll order MRI of the back  Return to clinic with a family member  Continue the medications  The clinic or ER earlier if any problems      Return in about 2 weeks (around 11/15/2018). Cosmo Garay reviewed my findings and recommendations with Kimmie Linn.     Electronicallysigned by Lisa Helm MD on 11/1/18 at 3:00 PM

## 2018-11-01 NOTE — PATIENT INSTRUCTIONS
Continue present treatment  We'll order MRI of the back  Return to clinic with a family member  Continue the medications  The clinic or ER earlier if any problems

## 2018-11-02 ENCOUNTER — TELEPHONE (OUTPATIENT)
Dept: PRIMARY CARE CLINIC | Age: 83
End: 2018-11-02

## 2018-11-05 ENCOUNTER — CARE COORDINATION (OUTPATIENT)
Dept: CARE COORDINATION | Age: 83
End: 2018-11-05

## 2018-11-09 ENCOUNTER — HOSPITAL ENCOUNTER (OUTPATIENT)
Dept: WOUND CARE | Age: 83
Discharge: HOME | End: 2018-11-09
Payer: COMMERCIAL

## 2018-11-12 ENCOUNTER — CARE COORDINATION (OUTPATIENT)
Dept: CARE COORDINATION | Age: 83
End: 2018-11-12

## 2018-11-13 DIAGNOSIS — S32.019A CLOSED FRACTURE OF FIRST LUMBAR VERTEBRA, UNSPECIFIED FRACTURE MORPHOLOGY, INITIAL ENCOUNTER (HCC): ICD-10-CM

## 2018-12-28 ENCOUNTER — HOSPITAL ENCOUNTER (OUTPATIENT)
Dept: WOUND CARE | Age: 83
Discharge: HOME | End: 2018-12-28
Payer: COMMERCIAL

## 2019-01-01 ENCOUNTER — HOSPITAL ENCOUNTER (OUTPATIENT)
Dept: WOUND CARE | Age: 84
Discharge: HOME | End: 2019-12-13
Payer: MEDICARE

## 2019-01-01 ENCOUNTER — HOSPITAL ENCOUNTER (OUTPATIENT)
Dept: WOUND CARE | Age: 84
Discharge: HOME | End: 2019-05-31
Payer: MEDICARE

## 2019-01-01 ENCOUNTER — HOSPITAL ENCOUNTER (OUTPATIENT)
Dept: WOUND CARE | Age: 84
Discharge: HOME | End: 2019-11-08
Payer: MEDICARE

## 2019-01-01 ENCOUNTER — HOSPITAL ENCOUNTER (OUTPATIENT)
Dept: WOUND CARE | Age: 84
Discharge: HOME | End: 2019-10-11
Payer: MEDICARE

## 2019-01-01 ENCOUNTER — HOSPITAL ENCOUNTER (OUTPATIENT)
Dept: WOUND CARE | Age: 84
Discharge: HOME | End: 2019-09-13
Payer: MEDICARE

## 2019-01-01 ENCOUNTER — HOSPITAL ENCOUNTER (OUTPATIENT)
Dept: WOUND CARE | Age: 84
Discharge: HOME | End: 2019-08-16
Payer: MEDICARE

## 2019-01-01 ENCOUNTER — HOSPITAL ENCOUNTER (OUTPATIENT)
Dept: WOUND CARE | Age: 84
Discharge: HOME | End: 2019-07-19
Payer: MEDICARE

## 2019-01-01 ENCOUNTER — HOSPITAL ENCOUNTER (OUTPATIENT)
Dept: WOUND CARE | Age: 84
Discharge: HOME | End: 2019-06-21
Payer: MEDICARE

## 2019-01-01 VITALS
TEMPERATURE: 97.7 F | DIASTOLIC BLOOD PRESSURE: 70 MMHG | HEIGHT: 67 IN | BODY MASS INDEX: 43.16 KG/M2 | HEART RATE: 70 BPM | WEIGHT: 275 LBS | SYSTOLIC BLOOD PRESSURE: 130 MMHG | RESPIRATION RATE: 18 BRPM

## 2019-01-01 VITALS
BODY MASS INDEX: 43.16 KG/M2 | SYSTOLIC BLOOD PRESSURE: 118 MMHG | RESPIRATION RATE: 18 BRPM | TEMPERATURE: 98.1 F | DIASTOLIC BLOOD PRESSURE: 60 MMHG | HEIGHT: 67 IN | WEIGHT: 275 LBS | HEART RATE: 66 BPM

## 2019-01-01 VITALS
RESPIRATION RATE: 18 BRPM | WEIGHT: 275 LBS | BODY MASS INDEX: 43.16 KG/M2 | HEIGHT: 67 IN | HEART RATE: 60 BPM | TEMPERATURE: 97.7 F | SYSTOLIC BLOOD PRESSURE: 100 MMHG | DIASTOLIC BLOOD PRESSURE: 52 MMHG

## 2019-01-01 VITALS
HEIGHT: 67 IN | HEART RATE: 80 BPM | TEMPERATURE: 97.7 F | RESPIRATION RATE: 18 BRPM | DIASTOLIC BLOOD PRESSURE: 67 MMHG | BODY MASS INDEX: 36.88 KG/M2 | WEIGHT: 235 LBS | SYSTOLIC BLOOD PRESSURE: 110 MMHG

## 2019-01-01 VITALS
DIASTOLIC BLOOD PRESSURE: 66 MMHG | HEART RATE: 72 BPM | RESPIRATION RATE: 20 BRPM | SYSTOLIC BLOOD PRESSURE: 110 MMHG | HEIGHT: 67 IN | WEIGHT: 247 LBS | BODY MASS INDEX: 38.77 KG/M2 | TEMPERATURE: 98.2 F

## 2019-01-01 VITALS
HEART RATE: 68 BPM | DIASTOLIC BLOOD PRESSURE: 64 MMHG | RESPIRATION RATE: 18 BRPM | SYSTOLIC BLOOD PRESSURE: 120 MMHG | TEMPERATURE: 97.7 F

## 2019-01-01 VITALS
RESPIRATION RATE: 14 BRPM | TEMPERATURE: 97.8 F | SYSTOLIC BLOOD PRESSURE: 118 MMHG | DIASTOLIC BLOOD PRESSURE: 68 MMHG | HEART RATE: 66 BPM

## 2019-01-01 VITALS
DIASTOLIC BLOOD PRESSURE: 60 MMHG | SYSTOLIC BLOOD PRESSURE: 116 MMHG | RESPIRATION RATE: 20 BRPM | TEMPERATURE: 97.5 F | HEART RATE: 80 BPM

## 2019-01-01 DIAGNOSIS — E66.01 MORBID OBESITY (HCC): ICD-10-CM

## 2019-01-01 LAB
ANAEROBIC CULTURE: ABNORMAL
ANAEROBIC CULTURE: ABNORMAL
ANAEROBIC CULTURE: NORMAL
ORGANISM: ABNORMAL
WOUND/ABSCESS: ABNORMAL

## 2019-01-01 PROCEDURE — 11042 DBRDMT SUBQ TIS 1ST 20SQCM/<: CPT

## 2019-01-01 PROCEDURE — 87186 SC STD MICRODIL/AGAR DIL: CPT

## 2019-01-01 PROCEDURE — 87075 CULTR BACTERIA EXCEPT BLOOD: CPT

## 2019-01-01 PROCEDURE — 11042 DBRDMT SUBQ TIS 1ST 20SQCM/<: CPT | Performed by: FAMILY MEDICINE

## 2019-01-01 PROCEDURE — 87070 CULTURE OTHR SPECIMN AEROBIC: CPT

## 2019-01-01 PROCEDURE — 87147 CULTURE TYPE IMMUNOLOGIC: CPT

## 2019-01-01 PROCEDURE — 87077 CULTURE AEROBIC IDENTIFY: CPT

## 2019-01-01 RX ORDER — POTASSIUM CHLORIDE 1.5 G/1.77G
20 POWDER, FOR SOLUTION ORAL DAILY
COMMUNITY

## 2019-01-01 RX ORDER — LIDOCAINE HYDROCHLORIDE 20 MG/ML
JELLY TOPICAL ONCE
Status: DISCONTINUED | OUTPATIENT
Start: 2019-01-01 | End: 2019-01-01 | Stop reason: HOSPADM

## 2019-01-01 RX ORDER — LISINOPRIL 5 MG/1
5 TABLET ORAL DAILY
COMMUNITY

## 2019-01-01 RX ORDER — LIDOCAINE HYDROCHLORIDE 20 MG/ML
3 JELLY TOPICAL ONCE
Status: DISCONTINUED | OUTPATIENT
Start: 2019-01-01 | End: 2019-01-01 | Stop reason: HOSPADM

## 2019-01-01 ASSESSMENT — PAIN DESCRIPTION - PAIN TYPE
TYPE: CHRONIC PAIN

## 2019-01-01 ASSESSMENT — PAIN DESCRIPTION - ORIENTATION
ORIENTATION: RIGHT

## 2019-01-01 ASSESSMENT — PAIN DESCRIPTION - PROGRESSION
CLINICAL_PROGRESSION: NOT CHANGED

## 2019-01-01 ASSESSMENT — PAIN DESCRIPTION - FREQUENCY
FREQUENCY: INTERMITTENT

## 2019-01-01 ASSESSMENT — PAIN SCALES - GENERAL
PAINLEVEL_OUTOF10: 5
PAINLEVEL_OUTOF10: 2
PAINLEVEL_OUTOF10: 4
PAINLEVEL_OUTOF10: 3
PAINLEVEL_OUTOF10: 2

## 2019-01-01 ASSESSMENT — PAIN DESCRIPTION - ONSET
ONSET: ON-GOING

## 2019-01-01 ASSESSMENT — PAIN DESCRIPTION - LOCATION
LOCATION: SACRUM
LOCATION: SACRUM
LOCATION: COCCYX
LOCATION: SACRUM

## 2019-01-01 ASSESSMENT — PAIN DESCRIPTION - DESCRIPTORS
DESCRIPTORS: ACHING;CONSTANT;DISCOMFORT
DESCRIPTORS: ACHING
DESCRIPTORS: ACHING;CRUSHING;DISCOMFORT

## 2019-01-04 ENCOUNTER — HOSPITAL ENCOUNTER (OUTPATIENT)
Dept: WOUND CARE | Age: 84
Discharge: HOME | End: 2019-01-04
Payer: MEDICARE

## 2019-01-11 ENCOUNTER — HOSPITAL ENCOUNTER (OUTPATIENT)
Dept: WOUND CARE | Age: 84
Discharge: HOME | End: 2019-01-11
Payer: COMMERCIAL

## 2019-01-11 VITALS
DIASTOLIC BLOOD PRESSURE: 60 MMHG | HEART RATE: 60 BPM | HEIGHT: 67 IN | BODY MASS INDEX: 41.97 KG/M2 | SYSTOLIC BLOOD PRESSURE: 122 MMHG | RESPIRATION RATE: 20 BRPM | TEMPERATURE: 98 F

## 2019-01-11 PROCEDURE — 11042 DBRDMT SUBQ TIS 1ST 20SQCM/<: CPT

## 2019-01-11 PROCEDURE — 11042 DBRDMT SUBQ TIS 1ST 20SQCM/<: CPT | Performed by: FAMILY MEDICINE

## 2019-01-11 ASSESSMENT — PAIN DESCRIPTION - ORIENTATION: ORIENTATION: RIGHT;LEFT;LOWER

## 2019-01-11 ASSESSMENT — PAIN SCALES - GENERAL: PAINLEVEL_OUTOF10: 5

## 2019-01-11 ASSESSMENT — PAIN DESCRIPTION - ONSET: ONSET: ON-GOING

## 2019-01-11 ASSESSMENT — PAIN DESCRIPTION - FREQUENCY
FREQUENCY: CONTINUOUS
FREQUENCY: CONTINUOUS

## 2019-01-11 ASSESSMENT — PAIN DESCRIPTION - PROGRESSION: CLINICAL_PROGRESSION: NOT CHANGED

## 2019-01-11 ASSESSMENT — PAIN DESCRIPTION - DESCRIPTORS: DESCRIPTORS: ACHING

## 2019-01-11 ASSESSMENT — PAIN DESCRIPTION - LOCATION: LOCATION: BUTTOCKS

## 2019-01-11 ASSESSMENT — PAIN DESCRIPTION - PAIN TYPE: TYPE: CHRONIC PAIN

## 2019-02-01 ENCOUNTER — HOSPITAL ENCOUNTER (OUTPATIENT)
Dept: WOUND CARE | Age: 84
Discharge: HOME | End: 2019-02-01
Payer: MEDICARE

## 2019-02-01 VITALS
TEMPERATURE: 97.8 F | HEART RATE: 68 BPM | SYSTOLIC BLOOD PRESSURE: 112 MMHG | DIASTOLIC BLOOD PRESSURE: 68 MMHG | RESPIRATION RATE: 14 BRPM

## 2019-02-01 PROCEDURE — 11042 DBRDMT SUBQ TIS 1ST 20SQCM/<: CPT | Performed by: FAMILY MEDICINE

## 2019-02-01 PROCEDURE — 11042 DBRDMT SUBQ TIS 1ST 20SQCM/<: CPT

## 2019-02-01 ASSESSMENT — PAIN SCALES - GENERAL: PAINLEVEL_OUTOF10: 2

## 2019-02-22 ENCOUNTER — HOSPITAL ENCOUNTER (OUTPATIENT)
Dept: WOUND CARE | Age: 84
Discharge: HOME | End: 2019-02-22
Payer: MEDICARE

## 2019-02-22 VITALS
SYSTOLIC BLOOD PRESSURE: 118 MMHG | BODY MASS INDEX: 41.97 KG/M2 | TEMPERATURE: 97.8 F | HEIGHT: 67 IN | HEART RATE: 78 BPM | RESPIRATION RATE: 18 BRPM | DIASTOLIC BLOOD PRESSURE: 68 MMHG

## 2019-02-22 DIAGNOSIS — E66.01 MORBID OBESITY (HCC): ICD-10-CM

## 2019-02-22 PROCEDURE — 11042 DBRDMT SUBQ TIS 1ST 20SQCM/<: CPT | Performed by: FAMILY MEDICINE

## 2019-02-22 PROCEDURE — 11042 DBRDMT SUBQ TIS 1ST 20SQCM/<: CPT

## 2019-03-15 ENCOUNTER — HOSPITAL ENCOUNTER (OUTPATIENT)
Dept: WOUND CARE | Age: 84
Discharge: HOME | End: 2019-03-15
Payer: MEDICARE

## 2019-03-15 VITALS
TEMPERATURE: 97.6 F | SYSTOLIC BLOOD PRESSURE: 120 MMHG | HEART RATE: 72 BPM | RESPIRATION RATE: 16 BRPM | DIASTOLIC BLOOD PRESSURE: 70 MMHG

## 2019-03-15 PROCEDURE — 11042 DBRDMT SUBQ TIS 1ST 20SQCM/<: CPT | Performed by: FAMILY MEDICINE

## 2019-03-15 PROCEDURE — 11042 DBRDMT SUBQ TIS 1ST 20SQCM/<: CPT

## 2019-03-15 ASSESSMENT — PAIN DESCRIPTION - PROGRESSION: CLINICAL_PROGRESSION: NOT CHANGED

## 2019-03-15 ASSESSMENT — PAIN SCALES - GENERAL: PAINLEVEL_OUTOF10: 2

## 2019-03-15 ASSESSMENT — PAIN DESCRIPTION - LOCATION: LOCATION: COCCYX

## 2019-03-15 ASSESSMENT — PAIN DESCRIPTION - FREQUENCY: FREQUENCY: INTERMITTENT

## 2019-03-15 ASSESSMENT — PAIN DESCRIPTION - PAIN TYPE: TYPE: CHRONIC PAIN

## 2019-03-15 ASSESSMENT — PAIN DESCRIPTION - DESCRIPTORS: DESCRIPTORS: ACHING;PRESSURE

## 2019-03-15 ASSESSMENT — PAIN DESCRIPTION - ONSET: ONSET: GRADUAL

## 2019-04-05 ENCOUNTER — HOSPITAL ENCOUNTER (OUTPATIENT)
Dept: WOUND CARE | Age: 84
Discharge: HOME | End: 2019-04-05
Payer: MEDICARE

## 2019-04-12 ENCOUNTER — HOSPITAL ENCOUNTER (OUTPATIENT)
Dept: WOUND CARE | Age: 84
Discharge: HOME | End: 2019-04-12
Payer: MEDICARE

## 2019-04-12 VITALS
HEART RATE: 82 BPM | HEIGHT: 67 IN | DIASTOLIC BLOOD PRESSURE: 70 MMHG | RESPIRATION RATE: 16 BRPM | SYSTOLIC BLOOD PRESSURE: 110 MMHG | WEIGHT: 275 LBS | BODY MASS INDEX: 43.16 KG/M2 | TEMPERATURE: 97.9 F

## 2019-04-12 PROCEDURE — 11042 DBRDMT SUBQ TIS 1ST 20SQCM/<: CPT | Performed by: FAMILY MEDICINE

## 2019-04-12 PROCEDURE — 11042 DBRDMT SUBQ TIS 1ST 20SQCM/<: CPT

## 2019-04-12 ASSESSMENT — PAIN DESCRIPTION - DESCRIPTORS: DESCRIPTORS: ACHING;DISCOMFORT

## 2019-04-12 ASSESSMENT — PAIN DESCRIPTION - PAIN TYPE: TYPE: CHRONIC PAIN

## 2019-04-12 ASSESSMENT — PAIN DESCRIPTION - FREQUENCY: FREQUENCY: INTERMITTENT

## 2019-04-12 ASSESSMENT — PAIN DESCRIPTION - ORIENTATION: ORIENTATION: LEFT;RIGHT

## 2019-04-12 ASSESSMENT — PAIN DESCRIPTION - PROGRESSION: CLINICAL_PROGRESSION: NOT CHANGED

## 2019-04-12 ASSESSMENT — PAIN DESCRIPTION - ONSET: ONSET: ON-GOING

## 2019-04-12 ASSESSMENT — PAIN DESCRIPTION - LOCATION: LOCATION: COCCYX

## 2019-04-12 ASSESSMENT — PAIN SCALES - GENERAL: PAINLEVEL_OUTOF10: 2

## 2019-04-12 NOTE — PROGRESS NOTES
Follow-Up Wound Progress Note  Adriana Lynn  AGE: 80 y.o. GENDER: female  DOD: 1935  TODAY'S DATE:  4/12/19  Subjective:    Adriana Lynn is a 80 y.o. female who presents today for wound check. Wound Etiology :  pressure ulcer: Stage III  Wound Location :  on right and sacral and right SI area Pain : {3/10     Abx : Absent       Overall Wound Assessment  Wound is is unchanged. Size has unchanged    Objective:    /70   Pulse 82   Temp 97.9 °F (36.6 °C)   Resp 16   Ht 5' 7\" (1.702 m)   Wt 275 lb (124.7 kg)   BMI 43.07 kg/m²   Wound 01/11/19 Sacrum Mid #5 aquired 1/17/13 (Active)   Wound Image   1/11/2019 10:15 AM   Wound Pressure Stage  3 1/11/2019 10:15 AM   Dressing Status Clean;Dry; Intact 3/15/2019 12:11 PM   Dressing Changed Changed/New 3/15/2019 12:11 PM   Dressing/Treatment Silver Dressing 3/15/2019 12:11 PM   Wound Cleansed Rinsed/Irrigated with saline 3/15/2019 12:11 PM   Wound Length (cm) 1.4 cm 4/12/2019 10:28 AM   Wound Width (cm) 0.5 cm 4/12/2019 10:28 AM   Wound Depth (cm) 0.3 cm 4/12/2019 10:28 AM   Wound Surface Area (cm^2) 0.7 cm^2 4/12/2019 10:28 AM   Change in Wound Size % (l*w) 75 4/12/2019 10:28 AM   Wound Volume (cm^3) 0.21 cm^3 4/12/2019 10:28 AM   Wound Healing % 81 4/12/2019 10:28 AM   Post-Procedure Length (cm) 1.4 cm 4/12/2019 10:44 AM   Post-Procedure Width (cm) 0.5 cm 4/12/2019 10:44 AM   Post-Procedure Depth (cm) 0.4 cm 4/12/2019 10:44 AM   Post-Procedure Surface Area (cm^2) 0.7 cm^2 4/12/2019 10:44 AM   Post-Procedure Volume (cm^3) 0.28 cm^3 4/12/2019 10:44 AM   Wound Assessment Pale;[place] 4/12/2019 10:28 AM   Drainage Amount Small 4/12/2019 10:28 AM   Drainage Description Yellow;Serous 4/12/2019 10:28 AM   Odor None 4/12/2019 10:28 AM   Sirisha-wound Assessment Intact 4/12/2019 10:28 AM   Number of days: 91       Wound 01/11/19 Sacrum Distal #6 aquired 1/1/19 (Active)   Wound Image   1/11/2019 10:15 AM   Wound Length (cm) 1 cm 1/11/2019 10:15 AM   Wound Width (cm) 0.3 cm 1/11/2019 10:15 AM   Wound Depth (cm) 0.1 cm 1/11/2019 10:15 AM   Wound Surface Area (cm^2) 0.3 cm^2 1/11/2019 10:15 AM   Wound Volume (cm^3) 0.03 cm^3 1/11/2019 10:15 AM   Post-Procedure Length (cm) 1 cm 1/11/2019 10:23 AM   Post-Procedure Width (cm) 0.3 cm 1/11/2019 10:23 AM   Post-Procedure Depth (cm) 0.1 cm 1/11/2019 10:23 AM   Post-Procedure Surface Area (cm^2) 0.3 cm^2 1/11/2019 10:23 AM   Post-Procedure Volume (cm^3) 0.03 cm^3 1/11/2019 10:23 AM   Wound Assessment Pink;Yellow 1/11/2019 10:15 AM   Odor None 1/11/2019 10:15 AM   Sirisha-wound Assessment Intact 1/11/2019 10:15 AM   Number of days: 91     Wound   serous exudate noted  Errythema - Present    (this wound was originally measured on:)            Measurements shown are from today's visit. Wound 01/11/19 Sacrum Mid #5 aquired 1/17/13-Wound Assessment: Pale, Pink     Wound 01/11/19 Sacrum Mid #5 aquired 1/17/13-Wound Assessment: Pale, Pink  Wound 01/11/19 Sacrum Mid #5 aquired 1/17/13-Sirisha-wound Assessment: Intact  Wound 01/11/19 Sacrum Mid #5 aquired 1/17/13-Drainage Amount: Small  Wound 01/11/19 Sacrum Mid #5 aquired 1/17/13-Drainage Description: Yellow, Serous  Wound 01/11/19 Sacrum Mid #5 aquired 1/17/13-Odor: None         Wound 01/11/19 Sacrum Mid #5 aquired 1/17/13 (Active)   Wound Image   1/11/2019 10:15 AM   Wound Pressure Stage  3 1/11/2019 10:15 AM   Dressing Status Clean;Dry; Intact 3/15/2019 12:11 PM   Dressing Changed Changed/New 3/15/2019 12:11 PM   Dressing/Treatment Silver Dressing 3/15/2019 12:11 PM   Wound Cleansed Rinsed/Irrigated with saline 3/15/2019 12:11 PM   Wound Length (cm) 1.4 cm 4/12/2019 10:28 AM   Wound Width (cm) 0.5 cm 4/12/2019 10:28 AM   Wound Depth (cm) 0.3 cm 4/12/2019 10:28 AM   Wound Surface Area (cm^2) 0.7 cm^2 4/12/2019 10:28 AM   Change in Wound Size % (l*w) 75 4/12/2019 10:28 AM   Wound Volume (cm^3) 0.21 cm^3 4/12/2019 10:28 AM   Wound Healing % 81 4/12/2019 10:28 AM   Post-Procedure Length (cm) 1.4 cm

## 2019-05-03 ENCOUNTER — HOSPITAL ENCOUNTER (OUTPATIENT)
Dept: WOUND CARE | Age: 84
Discharge: HOME | End: 2019-05-03
Payer: MEDICARE

## 2019-05-03 VITALS
DIASTOLIC BLOOD PRESSURE: 70 MMHG | HEIGHT: 67 IN | HEART RATE: 65 BPM | TEMPERATURE: 97.7 F | WEIGHT: 275 LBS | BODY MASS INDEX: 43.16 KG/M2 | SYSTOLIC BLOOD PRESSURE: 112 MMHG | RESPIRATION RATE: 16 BRPM

## 2019-05-03 PROCEDURE — 11042 DBRDMT SUBQ TIS 1ST 20SQCM/<: CPT

## 2019-05-03 PROCEDURE — 11042 DBRDMT SUBQ TIS 1ST 20SQCM/<: CPT | Performed by: FAMILY MEDICINE

## 2019-05-03 RX ORDER — PANTOPRAZOLE SODIUM 40 MG/1
40 GRANULE, DELAYED RELEASE ORAL
COMMUNITY
End: 2020-01-01 | Stop reason: DRUGHIGH

## 2019-05-03 RX ORDER — BISACODYL 10 MG
10 SUPPOSITORY, RECTAL RECTAL DAILY PRN
COMMUNITY

## 2019-05-03 RX ORDER — FERROUS SULFATE 325(65) MG
325 TABLET ORAL
COMMUNITY

## 2019-05-03 RX ORDER — GABAPENTIN 300 MG/1
300 CAPSULE ORAL 3 TIMES DAILY
COMMUNITY

## 2019-05-03 RX ORDER — OXYCODONE AND ACETAMINOPHEN 10; 325 MG/1; MG/1
1 TABLET ORAL EVERY 6 HOURS PRN
COMMUNITY

## 2019-05-03 RX ORDER — OYSTER SHELL CALCIUM WITH VITAMIN D 500; 200 MG/1; [IU]/1
1 TABLET, FILM COATED ORAL 2 TIMES DAILY
COMMUNITY

## 2019-05-03 RX ORDER — PREDNISONE 20 MG/1
20 TABLET ORAL DAILY
COMMUNITY
End: 2019-01-01 | Stop reason: ALTCHOICE

## 2019-05-03 RX ORDER — LOPERAMIDE HYDROCHLORIDE 2 MG/1
2 CAPSULE ORAL 4 TIMES DAILY PRN
COMMUNITY
End: 2020-01-01 | Stop reason: ALTCHOICE

## 2019-05-03 RX ORDER — SODIUM CHLORIDE/ALOE VERA
GEL (GRAM) NASAL PRN
COMMUNITY
End: 2020-01-01 | Stop reason: ALTCHOICE

## 2019-05-03 RX ORDER — GLUCOSAMINE/CHONDR SU A SOD 750-600 MG
TABLET ORAL
COMMUNITY
End: 2020-01-01 | Stop reason: ALTCHOICE

## 2019-05-03 RX ORDER — POLYETHYLENE GLYCOL 3350 17 G/17G
17 POWDER, FOR SOLUTION ORAL PRN
COMMUNITY

## 2019-05-03 ASSESSMENT — PAIN DESCRIPTION - LOCATION: LOCATION: COCCYX

## 2019-05-03 ASSESSMENT — PAIN DESCRIPTION - ONSET: ONSET: ON-GOING

## 2019-05-03 ASSESSMENT — PAIN DESCRIPTION - ORIENTATION: ORIENTATION: RIGHT

## 2019-05-03 ASSESSMENT — PAIN SCALES - GENERAL: PAINLEVEL_OUTOF10: 2

## 2019-05-03 ASSESSMENT — PAIN DESCRIPTION - PROGRESSION: CLINICAL_PROGRESSION: NOT CHANGED

## 2019-05-03 ASSESSMENT — PAIN DESCRIPTION - DESCRIPTORS: DESCRIPTORS: ACHING;CRUSHING

## 2019-05-03 ASSESSMENT — PAIN DESCRIPTION - PAIN TYPE: TYPE: CHRONIC PAIN

## 2019-05-03 ASSESSMENT — PAIN DESCRIPTION - FREQUENCY: FREQUENCY: INTERMITTENT

## 2019-05-03 NOTE — PROGRESS NOTES
Follow-Up Wound Progress Note  Kelley Hayden  AGE: 80 y.o. GENDER: female  DOD: 1935  TODAY'S DATE:  5/3/19  Subjective:    Kelley Hayden is a 80 y.o. female who presents today for wound check. Wound Etiology :  pressure ulcer: Stage III  Wound Location :  on right and sacral overlying the SI joint. Pain : {3/10     Abx : Absent       Overall Wound Assessment  Wound is is unchanged. Size has unchanged    Objective:    /70   Pulse 65   Temp 97.7 °F (36.5 °C) (Oral)   Resp 16   Ht 5' 7\" (1.702 m)   Wt 275 lb (124.7 kg)   BMI 43.07 kg/m²   Wound 01/11/19 Sacrum Mid #5 aquired 1/17/13 (Active)   Wound Image   1/11/2019 10:15 AM   Wound Pressure Stage  3 1/11/2019 10:15 AM   Dressing Status Clean;Dry; Intact 5/3/2019 11:57 AM   Dressing Changed Changed/New 5/3/2019 11:57 AM   Dressing/Treatment Alginate with Ag 5/3/2019 11:57 AM   Wound Cleansed Rinsed/Irrigated with saline 5/3/2019 11:57 AM   Wound Length (cm) 1.2 cm 5/3/2019 10:50 AM   Wound Width (cm) 0.6 cm 5/3/2019 10:50 AM   Wound Depth (cm) 0.4 cm 5/3/2019 10:50 AM   Wound Surface Area (cm^2) 0.72 cm^2 5/3/2019 10:50 AM   Change in Wound Size % (l*w) 74.29 5/3/2019 10:50 AM   Wound Volume (cm^3) 0.29 cm^3 5/3/2019 10:50 AM   Wound Healing % 74 5/3/2019 10:50 AM   Post-Procedure Length (cm) 1.2 cm 5/3/2019 11:55 AM   Post-Procedure Width (cm) 0.6 cm 5/3/2019 11:55 AM   Post-Procedure Depth (cm) 0.4 cm 5/3/2019 11:55 AM   Post-Procedure Surface Area (cm^2) 0.72 cm^2 5/3/2019 11:55 AM   Post-Procedure Volume (cm^3) 0.29 cm^3 5/3/2019 11:55 AM   Wound Assessment Pink 5/3/2019 10:50 AM   Drainage Amount Small 5/3/2019 10:50 AM   Drainage Description Yellow 5/3/2019 10:50 AM   Odor None 5/3/2019 10:50 AM   Sirisha-wound Assessment Maceration; White 5/3/2019 10:50 AM   Number of days: 112       Wound 01/11/19 Sacrum Distal #6 aquired 1/1/19 (Active)   Wound Image   1/11/2019 10:15 AM   Wound Length (cm) 1 cm 1/11/2019 10:15 AM   Wound Width (cm) 0.3 cm 1/11/2019 10:15 AM   Wound Depth (cm) 0.1 cm 1/11/2019 10:15 AM   Wound Surface Area (cm^2) 0.3 cm^2 1/11/2019 10:15 AM   Wound Volume (cm^3) 0.03 cm^3 1/11/2019 10:15 AM   Post-Procedure Length (cm) 1 cm 1/11/2019 10:23 AM   Post-Procedure Width (cm) 0.3 cm 1/11/2019 10:23 AM   Post-Procedure Depth (cm) 0.1 cm 1/11/2019 10:23 AM   Post-Procedure Surface Area (cm^2) 0.3 cm^2 1/11/2019 10:23 AM   Post-Procedure Volume (cm^3) 0.03 cm^3 1/11/2019 10:23 AM   Wound Assessment Pink;Yellow 1/11/2019 10:15 AM   Odor None 1/11/2019 10:15 AM   Sirisha-wound Assessment Intact 1/11/2019 10:15 AM   Number of days: 112     Wound   serous exudate noted  Errythema - Present    (this wound was originally measured on:)            Measurements shown are from today's visit. Wound 01/11/19 Sacrum Mid #5 aquired 1/17/13-Wound Assessment: Pink     Wound 01/11/19 Sacrum Mid #5 aquired 1/17/13-Wound Assessment: Pink  Wound 01/11/19 Sacrum Mid #5 aquired 1/17/13-Sirisha-wound Assessment: Maceration, White  Wound 01/11/19 Sacrum Mid #5 aquired 1/17/13-Drainage Amount: Small  Wound 01/11/19 Sacrum Mid #5 aquired 1/17/13-Drainage Description: Yellow  Wound 01/11/19 Sacrum Mid #5 aquired 1/17/13-Odor: None         Wound 01/11/19 Sacrum Mid #5 aquired 1/17/13 (Active)   Wound Image   1/11/2019 10:15 AM   Wound Pressure Stage  3 1/11/2019 10:15 AM   Dressing Status Clean;Dry; Intact 5/3/2019 11:57 AM   Dressing Changed Changed/New 5/3/2019 11:57 AM   Dressing/Treatment Alginate with Ag 5/3/2019 11:57 AM   Wound Cleansed Rinsed/Irrigated with saline 5/3/2019 11:57 AM   Wound Length (cm) 1.2 cm 5/3/2019 10:50 AM   Wound Width (cm) 0.6 cm 5/3/2019 10:50 AM   Wound Depth (cm) 0.4 cm 5/3/2019 10:50 AM   Wound Surface Area (cm^2) 0.72 cm^2 5/3/2019 10:50 AM   Change in Wound Size % (l*w) 74.29 5/3/2019 10:50 AM   Wound Volume (cm^3) 0.29 cm^3 5/3/2019 10:50 AM   Wound Healing % 74 5/3/2019 10:50 AM   Post-Procedure Length (cm) 1.2 cm 5/3/2019 11:55 AM Post-Procedure Width (cm) 0.6 cm 5/3/2019 11:55 AM   Post-Procedure Depth (cm) 0.4 cm 5/3/2019 11:55 AM   Post-Procedure Surface Area (cm^2) 0.72 cm^2 5/3/2019 11:55 AM   Post-Procedure Volume (cm^3) 0.29 cm^3 5/3/2019 11:55 AM   Wound Assessment Pink 5/3/2019 10:50 AM   Drainage Amount Small 5/3/2019 10:50 AM   Drainage Description Yellow 5/3/2019 10:50 AM   Odor None 5/3/2019 10:50 AM   Sirisha-wound Assessment Maceration; White 5/3/2019 10:50 AM   Number of days: 112       Wound 01/11/19 Sacrum Distal #6 aquired 1/1/19 (Active)   Wound Image   1/11/2019 10:15 AM   Wound Length (cm) 1 cm 1/11/2019 10:15 AM   Wound Width (cm) 0.3 cm 1/11/2019 10:15 AM   Wound Depth (cm) 0.1 cm 1/11/2019 10:15 AM   Wound Surface Area (cm^2) 0.3 cm^2 1/11/2019 10:15 AM   Wound Volume (cm^3) 0.03 cm^3 1/11/2019 10:15 AM   Post-Procedure Length (cm) 1 cm 1/11/2019 10:23 AM   Post-Procedure Width (cm) 0.3 cm 1/11/2019 10:23 AM   Post-Procedure Depth (cm) 0.1 cm 1/11/2019 10:23 AM   Post-Procedure Surface Area (cm^2) 0.3 cm^2 1/11/2019 10:23 AM   Post-Procedure Volume (cm^3) 0.03 cm^3 1/11/2019 10:23 AM   Wound Assessment Pink;Yellow 1/11/2019 10:15 AM   Odor None 1/11/2019 10:15 AM   Sirisha-wound Assessment Intact 1/11/2019 10:15 AM   Number of days: 112       Assessment:     Please refer to nursing measurements and assessment regarding wound size pre and post debridement. Wound check - Care provided includes removal of existing dressing and visual inspection    Procedure: Debridement Note: Wound # 5. At 0.72 cm sq. The patient was placed in the sitting position. Lidocaine  gauze was applied  at beginning of wound evaluation. An  Excisional Debridement was performed. Using a curette ,  the wound was debrided sharply of all fibrotic, necrotic, and hyperkeratotic tissue, including a layer of surrounding healthy tissue to stimulate epithelization.     The wound was excised through the level of the subcutaneous Wound Percentage debrided is 100%. Please refer to Nurses notes for pre and post debridement dimensions. Wound was irrigated with normal saline solution. Bleeding was with a small amount of bleeding, and controlled with pressure. Patient tolerated procedure well and was given proper instruction. Diagnosis: pressure ulcer: Stage III                      Patient Active Problem List   Diagnosis Code    [facility] ulcer [facility] coccygeal region, stage 3 [facility] L89.153    Pressure ulcer, sacrum L89.159    Cardiomegaly B63.6    Acute systolic CHF (congestive heart failure) ([facility]) I50.21    Atrial fibrillation ([facility]) I48.91    DM type 2 (diabetes mellitus, type 2) ([facility] E11.9    Morbid obesity ([facility] E66.01    Type 2 diabetes mellitus, without long-term current use of insulin ([facility] E11.9           Plan:      Treatment & Plan: 1.Excisional Debridement                              2. Alginate AG                              3. Discussed appropriate home care of this wound. 4. Patient instructions were given. 5. Follow Up in 1 week(s).                                      Odalys Marshall DO                           5/3/19     2:43 PM

## 2019-05-24 ENCOUNTER — HOSPITAL ENCOUNTER (OUTPATIENT)
Dept: WOUND CARE | Age: 84
Discharge: HOME | End: 2019-05-24
Payer: MEDICARE

## 2019-05-31 NOTE — PROGRESS NOTES
aquired 1/17/13-Wound Assessment: Pink     Wound 01/11/19 Sacrum Mid #5 aquired 1/17/13-Wound Assessment: Pink  Wound 01/11/19 Sacrum Mid #5 aquired 1/17/13-Sirisha-wound Assessment: Maceration  Wound 01/11/19 Sacrum Mid #5 aquired 1/17/13-Drainage Amount: Scant  Wound 01/11/19 Sacrum Mid #5 aquired 1/17/13-Drainage Description: Yellow            Wound 01/11/19 Sacrum Mid #5 aquired 1/17/13 (Active)   Dressing Status Clean;Dry; Intact 5/31/2019 12:02 PM   Dressing Changed Changed/New 5/31/2019 12:02 PM   Dressing/Treatment Alginate with Ag 5/31/2019 12:02 PM   Wound Cleansed Rinsed/Irrigated with saline 5/31/2019 12:02 PM   Wound Length (cm) 0.5 cm 5/31/2019 11:31 AM   Wound Width (cm) 0.5 cm 5/31/2019 11:31 AM   Wound Depth (cm) 0.4 cm 5/31/2019 11:31 AM   Wound Surface Area (cm^2) 0.25 cm^2 5/31/2019 11:31 AM   Change in Wound Size % (l*w) 91.07 5/31/2019 11:31 AM   Wound Volume (cm^3) 0.1 cm^3 5/31/2019 11:31 AM   Wound Healing % 91 5/31/2019 11:31 AM   Post-Procedure Length (cm) 0.5 cm 5/31/2019 11:53 AM   Post-Procedure Width (cm) 0.5 cm 5/31/2019 11:53 AM   Post-Procedure Depth (cm) 0.6 cm 5/31/2019 11:53 AM   Post-Procedure Surface Area (cm^2) 0.25 cm^2 5/31/2019 11:53 AM   Post-Procedure Volume (cm^3) 0.15 cm^3 5/31/2019 11:53 AM   Wound Assessment Pink 5/31/2019 11:31 AM   Drainage Amount Scant 5/31/2019 11:31 AM   Drainage Description Yellow 5/31/2019 11:31 AM   Odor None 5/3/2019 10:50 AM   Sirisha-wound Assessment Maceration 5/31/2019 11:31 AM   Number of days: 140       Wound 01/11/19 Sacrum Distal #6 aquired 1/1/19 (Active)   Number of days: 140       Assessment:     Please refer to nursing measurements and assessment regarding wound size pre and post debridement. Wound check - Care provided includes removal of existing dressing and visual inspection    Procedure: Debridement Note: Wound # 5. At 0.25 cm sq. The patient was placed in the sitting position.   Lidocaine  gauze was applied  at beginning of wound

## 2019-06-21 NOTE — PROGRESS NOTES
Follow-Up Wound Progress Note  Montse Marshall  AGE: 80 y.o. GENDER: female  DOD: 1935  TODAY'S DATE:  6/21/19  Subjective:    Montse Marshall is a 80 y.o. female who presents today for wound check. Wound Etiology :  pressure ulcer: Stage III  Wound Location :  on right and sacral at the SI joint on right  Pain : {1/10     Abx : Present      Overall Wound Assessment  Wound is is unchanged. Size has unchanged    Objective:    /64   Pulse 68   Temp 97.7 °F (36.5 °C) (Oral)   Resp 18   Wound 01/11/19 Sacrum Mid #5 aquired 1/17/13 (Active)   Wound Image   1/11/2019 10:15 AM   Wound Pressure Stage  3 1/11/2019 10:15 AM   Dressing Status Clean;Dry; Intact 5/31/2019 12:02 PM   Dressing Changed Changed/New 5/31/2019 12:02 PM   Dressing/Treatment Alginate with Ag 5/31/2019 12:02 PM   Wound Cleansed Rinsed/Irrigated with saline 5/31/2019 12:02 PM   Wound Length (cm) 0.5 cm 6/21/2019 11:25 AM   Wound Width (cm) 0.4 cm 6/21/2019 11:25 AM   Wound Depth (cm) 0.3 cm 6/21/2019 11:25 AM   Wound Surface Area (cm^2) 0.2 cm^2 6/21/2019 11:25 AM   Change in Wound Size % (l*w) 92.86 6/21/2019 11:25 AM   Wound Volume (cm^3) 0.06 cm^3 6/21/2019 11:25 AM   Wound Healing % 95 6/21/2019 11:25 AM   Post-Procedure Length (cm) 0.5 cm 6/21/2019 12:10 PM   Post-Procedure Width (cm) 0.4 cm 6/21/2019 12:10 PM   Post-Procedure Depth (cm) 0.2 cm 6/21/2019 12:10 PM   Post-Procedure Surface Area (cm^2) 0.2 cm^2 6/21/2019 12:10 PM   Post-Procedure Volume (cm^3) 0.04 cm^3 6/21/2019 12:10 PM   Wound Assessment Pale;Pink 6/21/2019 11:25 AM   Drainage Amount Moderate 6/21/2019 12:10 PM   Drainage Description Serosanguinous 6/21/2019 12:10 PM   Odor None 5/3/2019 10:50 AM   Sirisha-wound Assessment Maceration 6/21/2019 11:25 AM   Number of days: 161     Wound   serous exudate noted  Errythema - Present    (this wound was originally measured on:)            Measurements shown are from today's visit.   Wound 01/11/19 Sacrum Mid #5 aquired 1/17/13-Wound Assessment: Pale, Pink     Wound 01/11/19 Sacrum Mid #5 aquired 1/17/13-Wound Assessment: Pale, Pink  Wound 01/11/19 Sacrum Mid #5 aquired 1/17/13-Sirisha-wound Assessment: Maceration  Wound 01/11/19 Sacrum Mid #5 aquired 1/17/13-Drainage Amount: Moderate  Wound 01/11/19 Sacrum Mid #5 aquired 1/17/13-Drainage Description: Serosanguinous            Wound 01/11/19 Sacrum Mid #5 aquired 1/17/13 (Active)   Wound Image   1/11/2019 10:15 AM   Wound Pressure Stage  3 1/11/2019 10:15 AM   Dressing Status Clean;Dry; Intact 5/31/2019 12:02 PM   Dressing Changed Changed/New 5/31/2019 12:02 PM   Dressing/Treatment Alginate with Ag 5/31/2019 12:02 PM   Wound Cleansed Rinsed/Irrigated with saline 5/31/2019 12:02 PM   Wound Length (cm) 0.5 cm 6/21/2019 11:25 AM   Wound Width (cm) 0.4 cm 6/21/2019 11:25 AM   Wound Depth (cm) 0.3 cm 6/21/2019 11:25 AM   Wound Surface Area (cm^2) 0.2 cm^25 6/21/2019 11:25 AM   Change in Wound Size % (l*w) 92.86 6/21/2019 11:25 AM   Wound Volume (cm^3) 0.06 cm^3 6/21/2019 11:25 AM   Wound Healing % 95 6/21/2019 11:25 AM   Post-Procedure Length (cm) 0.5 cm 6/21/2019 12:10 PM   Post-Procedure Width (cm) 0.4 cm 6/21/2019 12:10 PM   Post-Procedure Depth (cm) 0.2 cm 6/21/2019 12:10 PM   Post-Procedure Surface Area (cm^2) 0.2 cm^2 6/21/2019 12:10 PM   Post-Procedure Volume (cm^3) 0.04 cm^3 6/21/2019 12:10 PM   Wound Assessment Pale;Pink 6/21/2019 11:25 AM   Drainage Amount Moderate 6/21/2019 12:10 PM   Drainage Description Serosanguinous 6/21/2019 12:10 PM   Odor None 5/3/2019 10:50 AM   Sirisha-wound Assessment Maceration 6/21/2019 11:25 AM   Number of days: 161       Assessment:     Please refer to nursing measurements and assessment regarding wound size pre and post debridement. Wound check - Care provided includes removal of existing dressing and visual inspection    Procedure: Debridement Note: Wound # 5. At 0.2 cm sq. The patient was placed in the sitting position.   Lidocaine  gauze was applied  at beginning of wound evaluation. An  Excisional Debridement was performed. Using a curette and #15 blade scalpel ,  the wound was debrided sharply of all fibrotic, necrotic, and hyperkeratotic tissue, including a layer of surrounding healthy tissue to stimulate epithelization. The wound was excised through the level of the subcutaneous Wound Percentage debrided is 100%. Please refer to Nurses notes for pre and post debridement dimensions. Wound was irrigated with normal saline solution. Bleeding was with a moderate amount of bleeding, and controlled with pressure. Patient tolerated procedure well and was given proper instruction. Diagnosis: pressure ulcer: Stage III                      Patient Active Problem List   Diagnosis Code    Pressure ulcer of coccygeal region, stage 3 ([facility] L89.153    Pressure ulcer, sacrum L89.159    Cardiomegaly M76.3    Acute systolic CHF (congestive heart failure) (HCC) I50.21    Atrial fibrillation ([facility]) I48.91    DM type 2 (diabetes mellitus, type 2) ([address] E11.9    Morbid obesity ([address] E66.01    Type 2 diabetes mellitus, without long-term current use of insulin ([facility] E11.9           Plan:      Treatment & Plan: 1.Excisional Debridement                              2. Alginate silver                              3. Discussed appropriate home care of this wound. 4. Patient instructions were given. 5. Follow Up in 4 week(s).                                      Valentino Silva, DO                           6/21/19     3:58 PM

## 2019-07-19 NOTE — PROGRESS NOTES
sq.  The patient was placed in the sitting position. Lidocaine  gauze was applied  at beginning of wound evaluation. An  Excisional Debridement was performed. Using a curette, scissors and forceps ,  the wound was debrided sharply of all fibrotic, necrotic, and hyperkeratotic tissue, including a layer of surrounding healthy tissue to stimulate epithelization. The wound was excised through the level of the subcutaneous Wound Percentage debrided is 100%. Please refer to Nurses notes for pre and post debridement dimensions. Wound was irrigated with normal saline solution. C&S taken  Bleeding was with a small amount of bleeding, and controlled with pressure. Patient tolerated procedure well and was given proper instruction. Diagnosis: pressure ulcer: Stage III                      Patient Active Problem List   Diagnosis Code    Pressure ulcer of coccygeal region, stage 3 ([facility] [address] L89.153    Pressure ulcer, sacrum L89.159    Cardiomegaly A31.2    Acute systolic CHF (congestive heart failure) ([facility]) I50.21    Atrial fibrillation ([facility]) I48.91    DM type 2 (diabetes mellitus, type 2) ([facility] [address] E11.9    Morbid obesity ([address] E66.01    Type 2 diabetes mellitus, without long-term current use of insulin ([address] E11.9           Plan:      Treatment & Plan: 1.Excisional Debridement                              2. Alginate with silver                              3. Discussed appropriate home care of this wound. 4. Patient instructions were given. 5. Follow Up in 2 week(s).                                      Saint [place], DO                           7/19/19     2:49 PM

## 2019-08-16 NOTE — PROGRESS NOTES
Mid #5 aquired 1/17/13-Wound Assessment: Unable to assess     Wound 01/11/19 Sacrum Mid #5 aquired 1/17/13-Wound Assessment: Unable to assess  Wound 01/11/19 Sacrum Mid #5 aquired 1/17/13-Sirisha-wound Assessment: Maceration  Wound 01/11/19 Sacrum Mid #5 aquired 1/17/13-Drainage Amount: None               Wound 01/11/19 Sacrum Mid #5 aquired 1/17/13 (Active)   Wound Image   1/11/2019 10:15 AM   Wound Pressure Stage  3 1/11/2019 10:15 AM   Dressing Status Clean;Dry; Intact 5/3/2019 11:57 AM   Dressing Changed Changed/New 8/16/2019 11:34 AM   Dressing/Treatment Alginate with Ag 8/16/2019 11:34 AM   Wound Cleansed Rinsed/Irrigated with saline 5/3/2019 11:57 AM   Wound Length (cm) 1 cm 8/16/2019 11:05 AM   Wound Width (cm) 0.5 cm 8/16/2019 11:05 AM   Wound Depth (cm) 1 cm 8/16/2019 11:05 AM   Wound Surface Area (cm^2) 0.5 cm^2 8/16/2019 11:05 AM   Change in Wound Size % (l*w) 82.14 8/16/2019 11:05 AM   Wound Volume (cm^3) 0.5 cm^3 8/16/2019 11:05 AM   Wound Healing % 55 8/16/2019 11:05 AM   Post-Procedure Length (cm) 1 cm 8/16/2019 11:34 AM   Post-Procedure Width (cm) 0.5 cm 8/16/2019 11:34 AM   Post-Procedure Depth (cm) 1.3 cm 8/16/2019 11:34 AM   Post-Procedure Surface Area (cm^2) 0.5 cm^2 8/16/2019 11:34 AM   Post-Procedure Volume (cm^3) 0.65 cm^3 8/16/2019 11:34 AM   Wound Assessment ALONZO 8/16/2019 11:05 AM   Drainage Amount None 8/16/2019 11:05 AM   Drainage Description Tan 7/19/2019 10:56 AM   Odor None 7/19/2019 10:56 AM   Sirisha-wound Assessment Maceration 8/16/2019 11:05 AM   Number of days: 217       Assessment:     Please refer to nursing measurements and assessment regarding wound size pre and post debridement. Wound check - Care provided includes removal of existing dressing and visual inspection    Procedure: Debridement Note: Wound # 5. At 0.5 cm sq. The patient was placed in the sitting position. Lidocaine  gauze was applied  at beginning of wound evaluation. An  Excisional Debridement was performed. Using a curette and tissue nippers ,  the wound was debrided sharply of all fibrotic, necrotic, and hyperkeratotic tissue, including a layer of surrounding healthy tissue to stimulate epithelization. The wound was excised through the level of the subcutaneous Wound Percentage debrided is 100%. Please refer to Nurses notes for pre and post debridement dimensions. Wound was irrigated with normal saline solution. Bleeding was with a small amount of bleeding, and controlled with pressure. Patient tolerated procedure well and was given proper instruction. Diagnosis: pressure ulcer: Stage III                      Patient Active Problem List   Diagnosis Code    Pressure ulcer of coccygeal region, stage 3 ([facility] L89.153    Pressure ulcer, sacrum L89.159    Cardiomegaly T37.3    Acute systolic CHF (congestive heart failure) ([facility]) I50.21    Atrial fibrillation ([facility]) I48.91    DM type 2 (diabetes mellitus, type 2) ([facility] E11.9    Morbid obesity ([facility] [facility] E66.01    Type 2 diabetes mellitus, without long-term current use of insulin ([facility] [facility] E11.9           Plan:      Treatment & Plan: 1.Excisional Debridement                              2. Alginate                              3. Discussed appropriate home care of this wound. 4. Patient instructions were given. 5. Follow Up in 3 week(s).                                      Lucila Esteves DO                           8/16/19     5:12 PM

## 2020-01-01 ENCOUNTER — APPOINTMENT (OUTPATIENT)
Dept: GENERAL RADIOLOGY | Age: 85
DRG: 207 | End: 2020-01-01
Payer: MEDICARE

## 2020-01-01 ENCOUNTER — HOSPITAL ENCOUNTER (OUTPATIENT)
Dept: WOUND CARE | Age: 85
Discharge: HOME | End: 2020-02-07
Payer: MEDICARE

## 2020-01-01 ENCOUNTER — APPOINTMENT (OUTPATIENT)
Dept: CT IMAGING | Age: 85
DRG: 207 | End: 2020-01-01
Payer: MEDICARE

## 2020-01-01 ENCOUNTER — HOSPITAL ENCOUNTER (OUTPATIENT)
Dept: WOUND CARE | Age: 85
Discharge: HOME | End: 2020-05-01
Payer: MEDICARE

## 2020-01-01 ENCOUNTER — HOSPITAL ENCOUNTER (OUTPATIENT)
Dept: WOUND CARE | Age: 85
Discharge: HOME | End: 2020-01-10
Payer: MEDICARE

## 2020-01-01 ENCOUNTER — APPOINTMENT (OUTPATIENT)
Dept: ULTRASOUND IMAGING | Age: 85
DRG: 207 | End: 2020-01-01
Payer: MEDICARE

## 2020-01-01 ENCOUNTER — HOSPITAL ENCOUNTER (OUTPATIENT)
Dept: WOUND CARE | Age: 85
Discharge: HOME | End: 2020-03-20
Payer: MEDICARE

## 2020-01-01 ENCOUNTER — HOSPITAL ENCOUNTER (OUTPATIENT)
Dept: WOUND CARE | Age: 85
Discharge: HOME | End: 2020-02-14
Payer: MEDICARE

## 2020-01-01 ENCOUNTER — HOSPITAL ENCOUNTER (OUTPATIENT)
Age: 85
Discharge: HOME | End: 2020-05-13
Payer: MEDICARE

## 2020-01-01 ENCOUNTER — HOSPITAL ENCOUNTER (INPATIENT)
Age: 85
LOS: 14 days | DRG: 207 | End: 2020-05-27
Attending: EMERGENCY MEDICINE | Admitting: INTERNAL MEDICINE
Payer: MEDICARE

## 2020-01-01 VITALS
TEMPERATURE: 98 F | HEIGHT: 67 IN | BODY MASS INDEX: 38.77 KG/M2 | DIASTOLIC BLOOD PRESSURE: 66 MMHG | HEART RATE: 84 BPM | RESPIRATION RATE: 20 BRPM | WEIGHT: 247 LBS | SYSTOLIC BLOOD PRESSURE: 124 MMHG

## 2020-01-01 VITALS
HEIGHT: 65 IN | OXYGEN SATURATION: 98 % | BODY MASS INDEX: 43.32 KG/M2 | RESPIRATION RATE: 20 BRPM | WEIGHT: 260 LBS | HEART RATE: 95 BPM | DIASTOLIC BLOOD PRESSURE: 64 MMHG | SYSTOLIC BLOOD PRESSURE: 102 MMHG | TEMPERATURE: 96.4 F

## 2020-01-01 VITALS
BODY MASS INDEX: 38.77 KG/M2 | WEIGHT: 247 LBS | HEIGHT: 67 IN | TEMPERATURE: 97.8 F | SYSTOLIC BLOOD PRESSURE: 130 MMHG | RESPIRATION RATE: 20 BRPM | HEART RATE: 78 BPM | DIASTOLIC BLOOD PRESSURE: 72 MMHG

## 2020-01-01 VITALS
SYSTOLIC BLOOD PRESSURE: 110 MMHG | DIASTOLIC BLOOD PRESSURE: 74 MMHG | HEART RATE: 72 BPM | RESPIRATION RATE: 20 BRPM | TEMPERATURE: 98.1 F

## 2020-01-01 LAB
ABO/RH: NORMAL
ACANTHOCYTES: ABNORMAL
ADENOVIRUS BY PCR: NOT DETECTED
ALBUMIN SERPL-MCNC: 2.2 G/DL (ref 3.5–5.2)
ALBUMIN SERPL-MCNC: 2.3 G/DL (ref 3.5–5.2)
ALBUMIN SERPL-MCNC: 2.4 G/DL (ref 3.5–5.2)
ALBUMIN SERPL-MCNC: 2.5 G/DL (ref 3.5–5.2)
ALBUMIN SERPL-MCNC: 2.5 G/DL (ref 3.5–5.2)
ALBUMIN SERPL-MCNC: 2.6 G/DL (ref 3.5–5.2)
ALBUMIN SERPL-MCNC: 2.7 G/DL (ref 3.5–5.2)
ALBUMIN SERPL-MCNC: 2.7 G/DL (ref 3.5–5.2)
ALBUMIN SERPL-MCNC: 2.8 G/DL (ref 3.5–5.2)
ALP BLD-CCNC: 138 U/L (ref 35–104)
ALP BLD-CCNC: 146 U/L (ref 35–104)
ALP BLD-CCNC: 164 U/L (ref 35–104)
ALP BLD-CCNC: 167 U/L (ref 35–104)
ALP BLD-CCNC: 167 U/L (ref 35–104)
ALP BLD-CCNC: 169 U/L (ref 35–104)
ALP BLD-CCNC: 185 U/L (ref 35–104)
ALP BLD-CCNC: 189 U/L (ref 35–104)
ALP BLD-CCNC: 192 U/L (ref 35–104)
ALP BLD-CCNC: 199 U/L (ref 35–104)
ALP BLD-CCNC: 211 U/L (ref 35–104)
ALT SERPL-CCNC: 16 U/L (ref 0–32)
ALT SERPL-CCNC: 18 U/L (ref 0–32)
ALT SERPL-CCNC: 18 U/L (ref 0–32)
ALT SERPL-CCNC: 22 U/L (ref 0–32)
ALT SERPL-CCNC: 26 U/L (ref 0–32)
ALT SERPL-CCNC: 30 U/L (ref 0–32)
ALT SERPL-CCNC: 31 U/L (ref 0–32)
ALT SERPL-CCNC: 43 U/L (ref 0–32)
ALT SERPL-CCNC: 52 U/L (ref 0–32)
ANAEROBIC CULTURE: ABNORMAL
ANAEROBIC CULTURE: ABNORMAL
ANION GAP SERPL CALCULATED.3IONS-SCNC: 10 MMOL/L (ref 7–16)
ANION GAP SERPL CALCULATED.3IONS-SCNC: 11 MMOL/L (ref 7–16)
ANION GAP SERPL CALCULATED.3IONS-SCNC: 12 MMOL/L (ref 7–16)
ANION GAP SERPL CALCULATED.3IONS-SCNC: 12 MMOL/L (ref 7–16)
ANION GAP SERPL CALCULATED.3IONS-SCNC: 13 MMOL/L (ref 7–16)
ANION GAP SERPL CALCULATED.3IONS-SCNC: 14 MMOL/L (ref 7–16)
ANION GAP SERPL CALCULATED.3IONS-SCNC: 14 MMOL/L (ref 7–16)
ANION GAP SERPL CALCULATED.3IONS-SCNC: 15 MMOL/L (ref 7–16)
ANION GAP SERPL CALCULATED.3IONS-SCNC: 16 MMOL/L (ref 7–16)
ANISOCYTOSIS: ABNORMAL
ANTIBODY SCREEN: NORMAL
APTT: 40.6 SEC (ref 24.5–35.1)
APTT: 66 SEC (ref 24.5–35.1)
APTT: 82.1 SEC (ref 24.5–35.1)
AST SERPL-CCNC: 39 U/L (ref 0–31)
AST SERPL-CCNC: 42 U/L (ref 0–31)
AST SERPL-CCNC: 43 U/L (ref 0–31)
AST SERPL-CCNC: 45 U/L (ref 0–31)
AST SERPL-CCNC: 47 U/L (ref 0–31)
AST SERPL-CCNC: 49 U/L (ref 0–31)
AST SERPL-CCNC: 53 U/L (ref 0–31)
AST SERPL-CCNC: 53 U/L (ref 0–31)
AST SERPL-CCNC: 73 U/L (ref 0–31)
AST SERPL-CCNC: 79 U/L (ref 0–31)
AST SERPL-CCNC: 82 U/L (ref 0–31)
AST SERPL-CCNC: 94 U/L (ref 0–31)
AST SERPL-CCNC: 99 U/L (ref 0–31)
B.E.: -0.1 MMOL/L (ref -3–3)
B.E.: -3 MMOL/L (ref -3–3)
B.E.: 1.8 MMOL/L (ref -3–3)
B.E.: 10.8 MMOL/L (ref -3–3)
B.E.: 11.9 MMOL/L (ref -3–3)
B.E.: 3.5 MMOL/L (ref -3–3)
B.E.: 5.1 MMOL/L (ref -3–3)
B.E.: 7.5 MMOL/L (ref -3–3)
B.E.: 8.2 MMOL/L (ref -3–3)
B.E.: 8.7 MMOL/L (ref -3–3)
BASOPHILS ABSOLUTE: 0 E9/L (ref 0–0.2)
BASOPHILS ABSOLUTE: 0.02 E9/L (ref 0–0.2)
BASOPHILS ABSOLUTE: 0.05 E9/L (ref 0–0.2)
BASOPHILS ABSOLUTE: 0.06 E9/L (ref 0–0.2)
BASOPHILS ABSOLUTE: 0.11 E9/L (ref 0–0.2)
BASOPHILS RELATIVE PERCENT: 0.2 % (ref 0–2)
BASOPHILS RELATIVE PERCENT: 0.2 % (ref 0–2)
BASOPHILS RELATIVE PERCENT: 0.3 % (ref 0–2)
BASOPHILS RELATIVE PERCENT: 0.4 % (ref 0–2)
BASOPHILS RELATIVE PERCENT: 1.7 % (ref 0–2)
BASOPHILS RELATIVE PERCENT: 2 % (ref 0–2)
BASOPHILS RELATIVE PERCENT: 2 % (ref 0–2)
BILIRUB SERPL-MCNC: 0.4 MG/DL (ref 0–1.2)
BILIRUB SERPL-MCNC: 0.5 MG/DL (ref 0–1.2)
BILIRUB SERPL-MCNC: 0.6 MG/DL (ref 0–1.2)
BILIRUB SERPL-MCNC: 0.6 MG/DL (ref 0–1.2)
BILIRUBIN DIRECT: 0.2 MG/DL (ref 0–0.3)
BILIRUBIN, INDIRECT: 0.3 MG/DL (ref 0–1)
BLOOD BANK DISPENSE STATUS: NORMAL
BLOOD BANK PRODUCT CODE: NORMAL
BLOOD CULTURE, ROUTINE: NORMAL
BLOOD CULTURE, ROUTINE: NORMAL
BORDETELLA PARAPERTUSSIS BY PCR: NOT DETECTED
BORDETELLA PERTUSSIS BY PCR: NOT DETECTED
BPU ID: NORMAL
BUN BLDV-MCNC: 15 MG/DL (ref 8–23)
BUN BLDV-MCNC: 17 MG/DL (ref 8–23)
BUN BLDV-MCNC: 19 MG/DL (ref 8–23)
BUN BLDV-MCNC: 22 MG/DL (ref 8–23)
BUN BLDV-MCNC: 22 MG/DL (ref 8–23)
BUN BLDV-MCNC: 23 MG/DL (ref 8–23)
BUN BLDV-MCNC: 25 MG/DL (ref 8–23)
BUN BLDV-MCNC: 27 MG/DL (ref 8–23)
BUN BLDV-MCNC: 28 MG/DL (ref 8–23)
BUN BLDV-MCNC: 28 MG/DL (ref 8–23)
BUN BLDV-MCNC: 30 MG/DL (ref 8–23)
BUN BLDV-MCNC: 31 MG/DL (ref 8–23)
BUN BLDV-MCNC: 33 MG/DL (ref 8–23)
BUN BLDV-MCNC: 33 MG/DL (ref 8–23)
BUN BLDV-MCNC: 35 MG/DL (ref 8–23)
BUN BLDV-MCNC: 39 MG/DL (ref 8–23)
BUN BLDV-MCNC: 39 MG/DL (ref 8–23)
BUN BLDV-MCNC: 40 MG/DL (ref 8–23)
BUN BLDV-MCNC: 42 MG/DL (ref 8–23)
BUN BLDV-MCNC: 45 MG/DL (ref 8–23)
BUN BLDV-MCNC: 45 MG/DL (ref 8–23)
BUN BLDV-MCNC: 47 MG/DL (ref 8–23)
BUN BLDV-MCNC: 51 MG/DL (ref 8–23)
C-REACTIVE PROTEIN: 1.4 MG/DL (ref 0–0.4)
C-REACTIVE PROTEIN: 1.8 MG/DL (ref 0–0.4)
C-REACTIVE PROTEIN: 1.8 MG/DL (ref 0–0.4)
C-REACTIVE PROTEIN: 1.9 MG/DL (ref 0–0.4)
C-REACTIVE PROTEIN: 1.9 MG/DL (ref 0–0.4)
C-REACTIVE PROTEIN: 11.9 MG/DL (ref 0–0.4)
C-REACTIVE PROTEIN: 13.2 MG/DL (ref 0–0.4)
C-REACTIVE PROTEIN: 2 MG/DL (ref 0–0.4)
C-REACTIVE PROTEIN: 2.3 MG/DL (ref 0–0.4)
C-REACTIVE PROTEIN: 3.4 MG/DL (ref 0–0.4)
C-REACTIVE PROTEIN: 4.4 MG/DL (ref 0–0.4)
C-REACTIVE PROTEIN: 6.2 MG/DL (ref 0–0.4)
C-REACTIVE PROTEIN: 7.9 MG/DL (ref 0–0.4)
CALCIUM SERPL-MCNC: 7.1 MG/DL (ref 8.6–10.2)
CALCIUM SERPL-MCNC: 7.9 MG/DL (ref 8.6–10.2)
CALCIUM SERPL-MCNC: 7.9 MG/DL (ref 8.6–10.2)
CALCIUM SERPL-MCNC: 8 MG/DL (ref 8.6–10.2)
CALCIUM SERPL-MCNC: 8.1 MG/DL (ref 8.6–10.2)
CALCIUM SERPL-MCNC: 8.2 MG/DL (ref 8.6–10.2)
CALCIUM SERPL-MCNC: 8.3 MG/DL (ref 8.6–10.2)
CALCIUM SERPL-MCNC: 8.4 MG/DL (ref 8.6–10.2)
CALCIUM SERPL-MCNC: 8.6 MG/DL (ref 8.6–10.2)
CHLAMYDOPHILIA PNEUMONIAE BY PCR: NOT DETECTED
CHLORIDE BLD-SCNC: 101 MMOL/L (ref 98–107)
CHLORIDE BLD-SCNC: 94 MMOL/L (ref 98–107)
CHLORIDE BLD-SCNC: 95 MMOL/L (ref 98–107)
CHLORIDE BLD-SCNC: 96 MMOL/L (ref 98–107)
CHLORIDE BLD-SCNC: 97 MMOL/L (ref 98–107)
CHLORIDE BLD-SCNC: 98 MMOL/L (ref 98–107)
CHLORIDE BLD-SCNC: 99 MMOL/L (ref 98–107)
CHLORIDE BLD-SCNC: 99 MMOL/L (ref 98–107)
CHLORIDE URINE RANDOM: 69 MMOL/L
CHOLESTEROL, TOTAL: 42 MG/DL (ref 0–199)
CO2: 21 MMOL/L (ref 22–29)
CO2: 21 MMOL/L (ref 22–29)
CO2: 22 MMOL/L (ref 22–29)
CO2: 23 MMOL/L (ref 22–29)
CO2: 23 MMOL/L (ref 22–29)
CO2: 24 MMOL/L (ref 22–29)
CO2: 25 MMOL/L (ref 22–29)
CO2: 26 MMOL/L (ref 22–29)
CO2: 27 MMOL/L (ref 22–29)
CO2: 27 MMOL/L (ref 22–29)
CO2: 28 MMOL/L (ref 22–29)
CO2: 29 MMOL/L (ref 22–29)
CO2: 30 MMOL/L (ref 22–29)
CO2: 30 MMOL/L (ref 22–29)
CO2: 31 MMOL/L (ref 22–29)
CO2: 31 MMOL/L (ref 22–29)
CO2: 33 MMOL/L (ref 22–29)
COHB: 0.1 % (ref 0–1.5)
COHB: 0.2 % (ref 0–1.5)
COHB: 0.2 % (ref 0–1.5)
COHB: 0.3 % (ref 0–1.5)
COMMENT: ABNORMAL
CORONAVIRUS 229E BY PCR: NOT DETECTED
CORONAVIRUS HKU1 BY PCR: NOT DETECTED
CORONAVIRUS NL63 BY PCR: NOT DETECTED
CORONAVIRUS OC43 BY PCR: NOT DETECTED
CORTISOL TOTAL: 7.62 MCG/DL (ref 2.68–18.4)
CREAT SERPL-MCNC: 0.6 MG/DL (ref 0.5–1)
CREAT SERPL-MCNC: 0.7 MG/DL (ref 0.5–1)
CREAT SERPL-MCNC: 0.8 MG/DL (ref 0.5–1)
CREAT SERPL-MCNC: 1.1 MG/DL (ref 0.5–1)
CREAT SERPL-MCNC: 1.1 MG/DL (ref 0.5–1)
CREAT SERPL-MCNC: 1.2 MG/DL (ref 0.5–1)
CREAT SERPL-MCNC: 1.7 MG/DL (ref 0.5–1)
CREAT SERPL-MCNC: 2.1 MG/DL (ref 0.5–1)
CREAT SERPL-MCNC: 2.4 MG/DL (ref 0.5–1)
CREAT SERPL-MCNC: 2.8 MG/DL (ref 0.5–1)
CREAT SERPL-MCNC: 2.9 MG/DL (ref 0.5–1)
CREAT SERPL-MCNC: 3.3 MG/DL (ref 0.5–1)
CREAT SERPL-MCNC: 3.6 MG/DL (ref 0.5–1)
CREAT SERPL-MCNC: 3.7 MG/DL (ref 0.5–1)
CREAT SERPL-MCNC: 3.8 MG/DL (ref 0.5–1)
CREAT SERPL-MCNC: 3.9 MG/DL (ref 0.5–1)
CREAT SERPL-MCNC: 3.9 MG/DL (ref 0.5–1)
CREAT SERPL-MCNC: 4.2 MG/DL (ref 0.5–1)
CREATININE URINE: 63 MG/DL (ref 29–226)
CRITICAL: ABNORMAL
CULTURE, BLOOD 2: NORMAL
CULTURE, BLOOD 2: NORMAL
CULTURE, RESPIRATORY: NORMAL
D DIMER: 1024 NG/ML DDU
D DIMER: 1028 NG/ML DDU
D DIMER: 1625 NG/ML DDU
D DIMER: 1715 NG/ML DDU
D DIMER: 1734 NG/ML DDU
D DIMER: 1847 NG/ML DDU
D DIMER: 2470 NG/ML DDU
D DIMER: 2635 NG/ML DDU
D DIMER: 3024 NG/ML DDU
D DIMER: 3730 NG/ML DDU
D DIMER: 4283 NG/ML DDU
D DIMER: 4467 NG/ML DDU
D DIMER: 889 NG/ML DDU
DATE ANALYZED: ABNORMAL
DATE OF COLLECTION: ABNORMAL
DELIVERY SYSTEMS: ABNORMAL
DELIVERY SYSTEMS: ABNORMAL
DESCRIPTION BLOOD BANK: NORMAL
DEVICE: ABNORMAL
EKG ATRIAL RATE: 65 BPM
EKG Q-T INTERVAL: 398 MS
EKG QRS DURATION: 72 MS
EKG QTC CALCULATION (BAZETT): 435 MS
EKG R AXIS: 11 DEGREES
EKG T AXIS: 12 DEGREES
EKG VENTRICULAR RATE: 72 BPM
EOSINOPHIL, URINE: 0 % (ref 0–1)
EOSINOPHILS ABSOLUTE: 0 E9/L (ref 0.05–0.5)
EOSINOPHILS ABSOLUTE: 0 E9/L (ref 0.05–0.5)
EOSINOPHILS ABSOLUTE: 0.11 E9/L (ref 0.05–0.5)
EOSINOPHILS ABSOLUTE: 0.13 E9/L (ref 0.05–0.5)
EOSINOPHILS ABSOLUTE: 0.24 E9/L (ref 0.05–0.5)
EOSINOPHILS ABSOLUTE: 0.24 E9/L (ref 0.05–0.5)
EOSINOPHILS ABSOLUTE: 0.38 E9/L (ref 0.05–0.5)
EOSINOPHILS ABSOLUTE: 0.4 E9/L (ref 0.05–0.5)
EOSINOPHILS ABSOLUTE: 0.45 E9/L (ref 0.05–0.5)
EOSINOPHILS RELATIVE PERCENT: 0.1 % (ref 0–6)
EOSINOPHILS RELATIVE PERCENT: 1.1 % (ref 0–6)
EOSINOPHILS RELATIVE PERCENT: 1.7 % (ref 0–6)
EOSINOPHILS RELATIVE PERCENT: 12 % (ref 0–6)
EOSINOPHILS RELATIVE PERCENT: 2.7 % (ref 0–6)
EOSINOPHILS RELATIVE PERCENT: 5.6 % (ref 0–6)
EOSINOPHILS RELATIVE PERCENT: 6 % (ref 0–6)
EOSINOPHILS RELATIVE PERCENT: 6.1 % (ref 0–6)
EOSINOPHILS RELATIVE PERCENT: 9 % (ref 0–6)
FERRITIN: 1165 NG/ML
FERRITIN: 1253 NG/ML
FERRITIN: 616 NG/ML
FERRITIN: 652 NG/ML
FERRITIN: 798 NG/ML
FIBRINOGEN: 366 MG/DL (ref 225–540)
FIBRINOGEN: 371 MG/DL (ref 225–540)
FIBRINOGEN: 397 MG/DL (ref 225–540)
FIBRINOGEN: 405 MG/DL (ref 225–540)
FIBRINOGEN: 413 MG/DL (ref 225–540)
FIBRINOGEN: 421 MG/DL (ref 225–540)
FIBRINOGEN: 445 MG/DL (ref 225–540)
FIBRINOGEN: 457 MG/DL (ref 225–540)
FIBRINOGEN: 469 MG/DL (ref 225–540)
FIBRINOGEN: 505 MG/DL (ref 225–540)
FIBRINOGEN: 566 MG/DL (ref 225–540)
FIBRINOGEN: 579 MG/DL (ref 225–540)
FIBRINOGEN: 599 MG/DL (ref 225–540)
FIO2 ARTERIAL: 50
FIO2 ARTERIAL: 50
FIO2 ARTERIAL: 60
FIO2: 100 %
FIO2: 100 %
FIO2: 50 %
FIO2: 60 %
FIO2: 80 %
GFR AFRICAN AMERICAN: 12
GFR AFRICAN AMERICAN: 13
GFR AFRICAN AMERICAN: 13
GFR AFRICAN AMERICAN: 14
GFR AFRICAN AMERICAN: 14
GFR AFRICAN AMERICAN: 15
GFR AFRICAN AMERICAN: 16
GFR AFRICAN AMERICAN: 19
GFR AFRICAN AMERICAN: 19
GFR AFRICAN AMERICAN: 23
GFR AFRICAN AMERICAN: 27
GFR AFRICAN AMERICAN: 35
GFR AFRICAN AMERICAN: 52
GFR AFRICAN AMERICAN: 57
GFR AFRICAN AMERICAN: 57
GFR AFRICAN AMERICAN: >60
GFR NON-AFRICAN AMERICAN: 10 ML/MIN/1.73
GFR NON-AFRICAN AMERICAN: 11 ML/MIN/1.73
GFR NON-AFRICAN AMERICAN: 12 ML/MIN/1.73
GFR NON-AFRICAN AMERICAN: 12 ML/MIN/1.73
GFR NON-AFRICAN AMERICAN: 13 ML/MIN/1.73
GFR NON-AFRICAN AMERICAN: 15 ML/MIN/1.73
GFR NON-AFRICAN AMERICAN: 16 ML/MIN/1.73
GFR NON-AFRICAN AMERICAN: 19 ML/MIN/1.73
GFR NON-AFRICAN AMERICAN: 22 ML/MIN/1.73
GFR NON-AFRICAN AMERICAN: 29 ML/MIN/1.73
GFR NON-AFRICAN AMERICAN: 43 ML/MIN/1.73
GFR NON-AFRICAN AMERICAN: 47 ML/MIN/1.73
GFR NON-AFRICAN AMERICAN: 47 ML/MIN/1.73
GFR NON-AFRICAN AMERICAN: >60 ML/MIN/1.73
GLUCOSE BLD-MCNC: 109 MG/DL (ref 74–99)
GLUCOSE BLD-MCNC: 123 MG/DL (ref 74–99)
GLUCOSE BLD-MCNC: 144 MG/DL (ref 74–99)
GLUCOSE BLD-MCNC: 145 MG/DL (ref 74–99)
GLUCOSE BLD-MCNC: 146 MG/DL (ref 74–99)
GLUCOSE BLD-MCNC: 153 MG/DL (ref 74–99)
GLUCOSE BLD-MCNC: 160 MG/DL (ref 74–99)
GLUCOSE BLD-MCNC: 160 MG/DL (ref 74–99)
GLUCOSE BLD-MCNC: 161 MG/DL (ref 74–99)
GLUCOSE BLD-MCNC: 165 MG/DL (ref 74–99)
GLUCOSE BLD-MCNC: 174 MG/DL (ref 74–99)
GLUCOSE BLD-MCNC: 189 MG/DL (ref 74–99)
GLUCOSE BLD-MCNC: 195 MG/DL (ref 74–99)
GLUCOSE BLD-MCNC: 209 MG/DL (ref 74–99)
GLUCOSE BLD-MCNC: 211 MG/DL (ref 74–99)
GLUCOSE BLD-MCNC: 214 MG/DL (ref 74–99)
GLUCOSE BLD-MCNC: 219 MG/DL (ref 74–99)
GLUCOSE BLD-MCNC: 235 MG/DL (ref 74–99)
GLUCOSE BLD-MCNC: 238 MG/DL (ref 74–99)
GLUCOSE BLD-MCNC: 245 MG/DL (ref 74–99)
GLUCOSE BLD-MCNC: 275 MG/DL (ref 74–99)
HBA1C MFR BLD: 8.7 % (ref 4–5.6)
HCO3 ARTERIAL: 27.5 MMOL/L (ref 22–26)
HCO3 ARTERIAL: 29.9 MMOL/L (ref 22–26)
HCO3 ARTERIAL: 32 MMOL/L (ref 22–26)
HCO3: 24.6 MMOL/L (ref 22–26)
HCO3: 31.3 MMOL/L (ref 22–26)
HCO3: 33.3 MMOL/L (ref 22–26)
HCO3: 33.8 MMOL/L (ref 22–26)
HCO3: 35.2 MMOL/L (ref 22–26)
HCO3: 36.8 MMOL/L (ref 22–26)
HCO3: 37.3 MMOL/L (ref 22–26)
HCT VFR BLD CALC: 29.1 % (ref 34–48)
HCT VFR BLD CALC: 29.6 % (ref 34–48)
HCT VFR BLD CALC: 30.5 % (ref 34–48)
HCT VFR BLD CALC: 31 % (ref 34–48)
HCT VFR BLD CALC: 32 % (ref 34–48)
HCT VFR BLD CALC: 32.8 % (ref 34–48)
HCT VFR BLD CALC: 33.6 % (ref 34–48)
HCT VFR BLD CALC: 36.7 % (ref 34–48)
HCT VFR BLD CALC: 37.2 % (ref 34–48)
HCT VFR BLD CALC: 37.3 % (ref 34–48)
HCT VFR BLD CALC: 37.8 % (ref 34–48)
HCT VFR BLD CALC: 37.9 % (ref 34–48)
HCT VFR BLD CALC: 38.2 % (ref 34–48)
HCT VFR BLD CALC: 39.6 % (ref 34–48)
HDLC SERPL-MCNC: 26 MG/DL
HEMOGLOBIN: 11.1 G/DL (ref 11.5–15.5)
HEMOGLOBIN: 11.2 G/DL (ref 11.5–15.5)
HEMOGLOBIN: 11.3 G/DL (ref 11.5–15.5)
HEMOGLOBIN: 11.4 G/DL (ref 11.5–15.5)
HEMOGLOBIN: 11.5 G/DL (ref 11.5–15.5)
HEMOGLOBIN: 11.5 G/DL (ref 11.5–15.5)
HEMOGLOBIN: 11.6 G/DL (ref 11.5–15.5)
HEMOGLOBIN: 8.8 G/DL (ref 11.5–15.5)
HEMOGLOBIN: 9 G/DL (ref 11.5–15.5)
HEMOGLOBIN: 9.2 G/DL (ref 11.5–15.5)
HEMOGLOBIN: 9.6 G/DL (ref 11.5–15.5)
HEMOGLOBIN: 9.6 G/DL (ref 11.5–15.5)
HEMOGLOBIN: 9.9 G/DL (ref 11.5–15.5)
HHB: 11.9 % (ref 0–5)
HHB: 2.1 % (ref 0–5)
HHB: 2.6 % (ref 0–5)
HHB: 3.6 % (ref 0–5)
HHB: 4.1 % (ref 0–5)
HHB: 7 % (ref 0–5)
HHB: 8.3 % (ref 0–5)
HUMAN METAPNEUMOVIRUS BY PCR: NOT DETECTED
HUMAN RHINOVIRUS/ENTEROVIRUS BY PCR: NOT DETECTED
HYPOCHROMIA: ABNORMAL
IMMATURE GRANULOCYTES #: 0.05 E9/L
IMMATURE GRANULOCYTES %: 0.8 % (ref 0–5)
IMMATURE RETIC FRACT: 11.7 % (ref 3–15.9)
INFLUENZA A BY PCR: NOT DETECTED
INFLUENZA B BY PCR: NOT DETECTED
INR BLD: 1.5
INR BLD: 2.2
L. PNEUMOPHILA SEROGP 1 UR AG: NORMAL
LAB: ABNORMAL
LACTATE DEHYDROGENASE: 279 U/L (ref 135–214)
LACTATE DEHYDROGENASE: 297 U/L (ref 135–214)
LACTATE DEHYDROGENASE: 436 U/L (ref 135–214)
LACTATE DEHYDROGENASE: 477 U/L (ref 135–214)
LACTIC ACID, SEPSIS: 1.3 MMOL/L (ref 0.5–1.9)
LACTIC ACID, SEPSIS: 1.6 MMOL/L (ref 0.5–1.9)
LACTIC ACID: 0.8 MMOL/L (ref 0.5–2.2)
LDL CHOLESTEROL CALCULATED: 11 MG/DL (ref 0–99)
LV EF: 60 %
LV EF: 62 %
LVEF MODALITY: NORMAL
LVEF MODALITY: NORMAL
LYMPHOCYTES ABSOLUTE: 0.09 E9/L (ref 1.5–4)
LYMPHOCYTES ABSOLUTE: 0.13 E9/L (ref 1.5–4)
LYMPHOCYTES ABSOLUTE: 0.19 E9/L (ref 1.5–4)
LYMPHOCYTES ABSOLUTE: 0.25 E9/L (ref 1.5–4)
LYMPHOCYTES ABSOLUTE: 0.27 E9/L (ref 1.5–4)
LYMPHOCYTES ABSOLUTE: 0.27 E9/L (ref 1.5–4)
LYMPHOCYTES ABSOLUTE: 0.37 E9/L (ref 1.5–4)
LYMPHOCYTES ABSOLUTE: 0.39 E9/L (ref 1.5–4)
LYMPHOCYTES ABSOLUTE: 0.84 E9/L (ref 1.5–4)
LYMPHOCYTES RELATIVE PERCENT: 0.9 % (ref 20–42)
LYMPHOCYTES RELATIVE PERCENT: 1.7 % (ref 20–42)
LYMPHOCYTES RELATIVE PERCENT: 10 % (ref 20–42)
LYMPHOCYTES RELATIVE PERCENT: 4.1 % (ref 20–42)
LYMPHOCYTES RELATIVE PERCENT: 5.3 % (ref 20–42)
LYMPHOCYTES RELATIVE PERCENT: 5.3 % (ref 20–42)
LYMPHOCYTES RELATIVE PERCENT: 6 % (ref 20–42)
LYMPHOCYTES RELATIVE PERCENT: 7.9 % (ref 20–42)
LYMPHOCYTES RELATIVE PERCENT: 9.3 % (ref 20–42)
Lab: ABNORMAL
Lab: NORMAL
Lab: NORMAL
MAGNESIUM: 1.3 MG/DL (ref 1.6–2.6)
MAGNESIUM: 1.5 MG/DL (ref 1.6–2.6)
MAGNESIUM: 1.6 MG/DL (ref 1.6–2.6)
MAGNESIUM: 1.7 MG/DL (ref 1.6–2.6)
MAGNESIUM: 1.7 MG/DL (ref 1.6–2.6)
MAGNESIUM: 1.8 MG/DL (ref 1.6–2.6)
MAGNESIUM: 1.8 MG/DL (ref 1.6–2.6)
MAGNESIUM: 2 MG/DL (ref 1.6–2.6)
MCH RBC QN AUTO: 26.3 PG (ref 26–35)
MCH RBC QN AUTO: 26.9 PG (ref 26–35)
MCH RBC QN AUTO: 27.1 PG (ref 26–35)
MCH RBC QN AUTO: 27.1 PG (ref 26–35)
MCH RBC QN AUTO: 27.2 PG (ref 26–35)
MCH RBC QN AUTO: 27.3 PG (ref 26–35)
MCH RBC QN AUTO: 27.4 PG (ref 26–35)
MCH RBC QN AUTO: 27.4 PG (ref 26–35)
MCH RBC QN AUTO: 27.6 PG (ref 26–35)
MCH RBC QN AUTO: 27.6 PG (ref 26–35)
MCHC RBC AUTO-ENTMCNC: 29.3 % (ref 32–34.5)
MCHC RBC AUTO-ENTMCNC: 29.3 % (ref 32–34.5)
MCHC RBC AUTO-ENTMCNC: 29.5 % (ref 32–34.5)
MCHC RBC AUTO-ENTMCNC: 30 % (ref 32–34.5)
MCHC RBC AUTO-ENTMCNC: 30.2 % (ref 32–34.5)
MCHC RBC AUTO-ENTMCNC: 30.3 % (ref 32–34.5)
MCHC RBC AUTO-ENTMCNC: 30.3 % (ref 32–34.5)
MCHC RBC AUTO-ENTMCNC: 30.4 % (ref 32–34.5)
MCV RBC AUTO: 88.6 FL (ref 80–99.9)
MCV RBC AUTO: 89.7 FL (ref 80–99.9)
MCV RBC AUTO: 89.8 FL (ref 80–99.9)
MCV RBC AUTO: 90.2 FL (ref 80–99.9)
MCV RBC AUTO: 90.3 FL (ref 80–99.9)
MCV RBC AUTO: 90.9 FL (ref 80–99.9)
MCV RBC AUTO: 91.2 FL (ref 80–99.9)
MCV RBC AUTO: 91.3 FL (ref 80–99.9)
MCV RBC AUTO: 92.1 FL (ref 80–99.9)
MCV RBC AUTO: 92.9 FL (ref 80–99.9)
METAMYELOCYTES RELATIVE PERCENT: 1 % (ref 0–1)
METER GLUCOSE: 116 MG/DL (ref 74–99)
METER GLUCOSE: 121 MG/DL (ref 74–99)
METER GLUCOSE: 123 MG/DL (ref 74–99)
METER GLUCOSE: 125 MG/DL (ref 74–99)
METER GLUCOSE: 134 MG/DL (ref 74–99)
METER GLUCOSE: 135 MG/DL (ref 74–99)
METER GLUCOSE: 135 MG/DL (ref 74–99)
METER GLUCOSE: 137 MG/DL (ref 74–99)
METER GLUCOSE: 140 MG/DL (ref 74–99)
METER GLUCOSE: 142 MG/DL (ref 74–99)
METER GLUCOSE: 145 MG/DL (ref 74–99)
METER GLUCOSE: 145 MG/DL (ref 74–99)
METER GLUCOSE: 149 MG/DL (ref 74–99)
METER GLUCOSE: 151 MG/DL (ref 74–99)
METER GLUCOSE: 152 MG/DL (ref 74–99)
METER GLUCOSE: 153 MG/DL (ref 74–99)
METER GLUCOSE: 157 MG/DL (ref 74–99)
METER GLUCOSE: 160 MG/DL (ref 74–99)
METER GLUCOSE: 164 MG/DL (ref 74–99)
METER GLUCOSE: 166 MG/DL (ref 74–99)
METER GLUCOSE: 167 MG/DL (ref 74–99)
METER GLUCOSE: 167 MG/DL (ref 74–99)
METER GLUCOSE: 171 MG/DL (ref 74–99)
METER GLUCOSE: 178 MG/DL (ref 74–99)
METER GLUCOSE: 179 MG/DL (ref 74–99)
METER GLUCOSE: 179 MG/DL (ref 74–99)
METER GLUCOSE: 180 MG/DL (ref 74–99)
METER GLUCOSE: 185 MG/DL (ref 74–99)
METER GLUCOSE: 187 MG/DL (ref 74–99)
METER GLUCOSE: 189 MG/DL (ref 74–99)
METER GLUCOSE: 195 MG/DL (ref 74–99)
METER GLUCOSE: 197 MG/DL (ref 74–99)
METER GLUCOSE: 201 MG/DL (ref 74–99)
METER GLUCOSE: 204 MG/DL (ref 74–99)
METER GLUCOSE: 207 MG/DL (ref 74–99)
METER GLUCOSE: 218 MG/DL (ref 74–99)
METER GLUCOSE: 218 MG/DL (ref 74–99)
METER GLUCOSE: 219 MG/DL (ref 74–99)
METER GLUCOSE: 220 MG/DL (ref 74–99)
METER GLUCOSE: 222 MG/DL (ref 74–99)
METER GLUCOSE: 223 MG/DL (ref 74–99)
METER GLUCOSE: 225 MG/DL (ref 74–99)
METER GLUCOSE: 226 MG/DL (ref 74–99)
METER GLUCOSE: 228 MG/DL (ref 74–99)
METER GLUCOSE: 234 MG/DL (ref 74–99)
METER GLUCOSE: 237 MG/DL (ref 74–99)
METER GLUCOSE: 240 MG/DL (ref 74–99)
METER GLUCOSE: 252 MG/DL (ref 74–99)
METER GLUCOSE: 253 MG/DL (ref 74–99)
METHB: 0.2 % (ref 0–1.5)
METHB: 0.2 % (ref 0–1.5)
METHB: 0.3 % (ref 0–1.5)
METHB: 0.4 % (ref 0–1.5)
METHB: 0.4 % (ref 0–1.5)
MISCELLANEOUS LAB TEST RESULT: ABNORMAL
MODE: ABNORMAL
MODE: AC
MONOCYTES ABSOLUTE: 0 E9/L (ref 0.1–0.95)
MONOCYTES ABSOLUTE: 0.13 E9/L (ref 0.1–0.95)
MONOCYTES ABSOLUTE: 0.13 E9/L (ref 0.1–0.95)
MONOCYTES ABSOLUTE: 0.2 E9/L (ref 0.1–0.95)
MONOCYTES ABSOLUTE: 0.21 E9/L (ref 0.1–0.95)
MONOCYTES ABSOLUTE: 0.24 E9/L (ref 0.1–0.95)
MONOCYTES ABSOLUTE: 0.39 E9/L (ref 0.1–0.95)
MONOCYTES ABSOLUTE: 0.46 E9/L (ref 0.1–0.95)
MONOCYTES ABSOLUTE: 0.62 E9/L (ref 0.1–0.95)
MONOCYTES RELATIVE PERCENT: 1.8 % (ref 2–12)
MONOCYTES RELATIVE PERCENT: 2.3 % (ref 2–12)
MONOCYTES RELATIVE PERCENT: 2.8 % (ref 2–12)
MONOCYTES RELATIVE PERCENT: 3.5 % (ref 2–12)
MONOCYTES RELATIVE PERCENT: 4 % (ref 2–12)
MONOCYTES RELATIVE PERCENT: 5.2 % (ref 2–12)
MONOCYTES RELATIVE PERCENT: 6.1 % (ref 2–12)
MONOCYTES RELATIVE PERCENT: 9 % (ref 2–12)
MONOCYTES RELATIVE PERCENT: 9.3 % (ref 2–12)
MYCOPLASMA PNEUMONIAE BY PCR: NOT DETECTED
NEUTROPHILS ABSOLUTE: 1.89 E9/L (ref 1.8–7.3)
NEUTROPHILS ABSOLUTE: 2.43 E9/L (ref 1.8–7.3)
NEUTROPHILS ABSOLUTE: 3.53 E9/L (ref 1.8–7.3)
NEUTROPHILS ABSOLUTE: 4.36 E9/L (ref 1.8–7.3)
NEUTROPHILS ABSOLUTE: 5.28 E9/L (ref 1.8–7.3)
NEUTROPHILS ABSOLUTE: 5.79 E9/L (ref 1.8–7.3)
NEUTROPHILS ABSOLUTE: 6.59 E9/L (ref 1.8–7.3)
NEUTROPHILS ABSOLUTE: 8.74 E9/L (ref 1.8–7.3)
NEUTROPHILS ABSOLUTE: 9.45 E9/L (ref 1.8–7.3)
NEUTROPHILS RELATIVE PERCENT: 69 % (ref 43–80)
NEUTROPHILS RELATIVE PERCENT: 76 % (ref 43–80)
NEUTROPHILS RELATIVE PERCENT: 79.5 % (ref 43–80)
NEUTROPHILS RELATIVE PERCENT: 82.2 % (ref 43–80)
NEUTROPHILS RELATIVE PERCENT: 88.5 % (ref 43–80)
NEUTROPHILS RELATIVE PERCENT: 88.6 % (ref 43–80)
NEUTROPHILS RELATIVE PERCENT: 88.7 % (ref 43–80)
NEUTROPHILS RELATIVE PERCENT: 90.4 % (ref 43–80)
NEUTROPHILS RELATIVE PERCENT: 93.9 % (ref 43–80)
NUCLEATED RED BLOOD CELLS: 1 /100 WBC
O2 CONTENT: 13.3 ML/DL
O2 CONTENT: 14.7 ML/DL
O2 CONTENT: 15.5 ML/DL
O2 CONTENT: 15.7 ML/DL
O2 CONTENT: 15.8 ML/DL
O2 CONTENT: 16.5 ML/DL
O2 CONTENT: 16.7 ML/DL
O2 SATURATION: 88 % (ref 92–98.5)
O2 SATURATION: 91.6 % (ref 92–98.5)
O2 SATURATION: 92.4 % (ref 92–98.5)
O2 SATURATION: 93 % (ref 92–98.5)
O2 SATURATION: 95.9 % (ref 92–98.5)
O2 SATURATION: 95.9 % (ref 92–98.5)
O2 SATURATION: 96.4 % (ref 92–98.5)
O2 SATURATION: 97.4 % (ref 92–98.5)
O2 SATURATION: 97.5 % (ref 92–98.5)
O2 SATURATION: 97.9 % (ref 92–98.5)
O2HB: 87.6 % (ref 94–97)
O2HB: 91.1 % (ref 94–97)
O2HB: 92.5 % (ref 94–97)
O2HB: 95.4 % (ref 94–97)
O2HB: 95.8 % (ref 94–97)
O2HB: 97 % (ref 94–97)
O2HB: 97.2 % (ref 94–97)
OCCULT BLOOD, OTHER: POSITIVE
OPERATOR ID: 3
OPERATOR ID: 3
OPERATOR ID: 30
OPERATOR ID: 377
OPERATOR ID: 4001
OPERATOR ID: 797
OPERATOR ID: 9
OPERATOR ID: ABNORMAL
ORGANISM: ABNORMAL
ORGANISM: ABNORMAL
OVALOCYTES: ABNORMAL
PARAINFLUENZA VIRUS 1 BY PCR: NOT DETECTED
PARAINFLUENZA VIRUS 2 BY PCR: NOT DETECTED
PARAINFLUENZA VIRUS 3 BY PCR: NOT DETECTED
PARAINFLUENZA VIRUS 4 BY PCR: NOT DETECTED
PATIENT TEMP: 37 C
PCO2 ARTERIAL: 56.7 MMHG (ref 35–45)
PCO2 ARTERIAL: 62.1 MMHG (ref 35–45)
PCO2 ARTERIAL: 64.8 MMHG (ref 35–45)
PCO2: 52.1 MMHG (ref 35–45)
PCO2: 52.6 MMHG (ref 35–45)
PCO2: 52.8 MMHG (ref 35–45)
PCO2: 52.9 MMHG (ref 35–45)
PCO2: 56 MMHG (ref 35–45)
PCO2: 56.4 MMHG (ref 35–45)
PCO2: 57.8 MMHG (ref 35–45)
PDW BLD-RTO: 18.1 FL (ref 11.5–15)
PDW BLD-RTO: 18.1 FL (ref 11.5–15)
PDW BLD-RTO: 18.2 FL (ref 11.5–15)
PDW BLD-RTO: 18.3 FL (ref 11.5–15)
PDW BLD-RTO: 18.3 FL (ref 11.5–15)
PDW BLD-RTO: 18.5 FL (ref 11.5–15)
PDW BLD-RTO: 18.6 FL (ref 11.5–15)
PDW BLD-RTO: 18.8 FL (ref 11.5–15)
PEEP/CPAP: 10 CMH2O
PEEP/CPAP: 12 CMH2O
PEEP/CPAP: 12 CMH2O
PEEP/CPAP: 13 CMH2O
PFO2: 0.58 MMHG/%
PFO2: 1.08 MMHG/%
PFO2: 1.13 MMHG/%
PFO2: 1.25 MMHG/%
PFO2: 2.14 MMHG/%
PH BLOOD GAS: 7.26 (ref 7.35–7.45)
PH BLOOD GAS: 7.29 (ref 7.35–7.45)
PH BLOOD GAS: 7.29 (ref 7.35–7.45)
PH BLOOD GAS: 7.3 (ref 7.35–7.45)
PH BLOOD GAS: 7.39 (ref 7.35–7.45)
PH BLOOD GAS: 7.4 (ref 7.35–7.45)
PH BLOOD GAS: 7.42 (ref 7.35–7.45)
PH BLOOD GAS: 7.42 (ref 7.35–7.45)
PH BLOOD GAS: 7.43 (ref 7.35–7.45)
PH BLOOD GAS: 7.47 (ref 7.35–7.45)
PHOSPHORUS: 2.2 MG/DL (ref 2.5–4.5)
PHOSPHORUS: 2.8 MG/DL (ref 2.5–4.5)
PHOSPHORUS: 3 MG/DL (ref 2.5–4.5)
PHOSPHORUS: 3.1 MG/DL (ref 2.5–4.5)
PHOSPHORUS: 3.1 MG/DL (ref 2.5–4.5)
PHOSPHORUS: 3.2 MG/DL (ref 2.5–4.5)
PHOSPHORUS: 4.6 MG/DL (ref 2.5–4.5)
PHOSPHORUS: 4.7 MG/DL (ref 2.5–4.5)
PIP: 17 CMH2O
PLATELET # BLD: 216 E9/L (ref 130–450)
PLATELET # BLD: 217 E9/L (ref 130–450)
PLATELET # BLD: 219 E9/L (ref 130–450)
PLATELET # BLD: 223 E9/L (ref 130–450)
PLATELET # BLD: 225 E9/L (ref 130–450)
PLATELET # BLD: 230 E9/L (ref 130–450)
PLATELET # BLD: 234 E9/L (ref 130–450)
PLATELET # BLD: 236 E9/L (ref 130–450)
PLATELET # BLD: 246 E9/L (ref 130–450)
PLATELET # BLD: 293 E9/L (ref 130–450)
PMV BLD AUTO: 10.3 FL (ref 7–12)
PMV BLD AUTO: 10.4 FL (ref 7–12)
PMV BLD AUTO: 10.4 FL (ref 7–12)
PMV BLD AUTO: 10.6 FL (ref 7–12)
PMV BLD AUTO: 10.8 FL (ref 7–12)
PMV BLD AUTO: 11 FL (ref 7–12)
PMV BLD AUTO: 11.1 FL (ref 7–12)
PMV BLD AUTO: 11.1 FL (ref 7–12)
PMV BLD AUTO: 11.3 FL (ref 7–12)
PMV BLD AUTO: 11.4 FL (ref 7–12)
PO2 ARTERIAL: 110.5 MMHG (ref 60–80)
PO2 ARTERIAL: 74.5 MMHG (ref 60–80)
PO2 ARTERIAL: 91.6 MMHG (ref 60–80)
PO2: 106.9 MMHG (ref 75–100)
PO2: 125.5 MMHG (ref 75–100)
PO2: 57.7 MMHG (ref 75–100)
PO2: 67.3 MMHG (ref 75–100)
PO2: 67.7 MMHG (ref 75–100)
PO2: 86.6 MMHG (ref 75–100)
PO2: 90.7 MMHG (ref 75–100)
POIKILOCYTES: ABNORMAL
POLYCHROMASIA: ABNORMAL
POSITIVE END EXP PRESS: 11 CMH2O
POSITIVE END EXP PRESS: 13 CMH2O
POSITIVE END EXP PRESS: 13 CMH2O
POTASSIUM REFLEX MAGNESIUM: 4.4 MMOL/L (ref 3.5–5)
POTASSIUM SERPL-SCNC: 3 MMOL/L (ref 3.5–5)
POTASSIUM SERPL-SCNC: 3.5 MMOL/L (ref 3.5–5)
POTASSIUM SERPL-SCNC: 3.7 MMOL/L (ref 3.5–5)
POTASSIUM SERPL-SCNC: 3.7 MMOL/L (ref 3.5–5)
POTASSIUM SERPL-SCNC: 4 MMOL/L (ref 3.5–5)
POTASSIUM SERPL-SCNC: 4.1 MMOL/L (ref 3.5–5)
POTASSIUM SERPL-SCNC: 4.1 MMOL/L (ref 3.5–5)
POTASSIUM SERPL-SCNC: 4.7 MMOL/L (ref 3.5–5)
POTASSIUM SERPL-SCNC: 5.2 MMOL/L (ref 3.5–5)
POTASSIUM SERPL-SCNC: 5.4 MMOL/L (ref 3.5–5)
POTASSIUM SERPL-SCNC: 5.4 MMOL/L (ref 3.5–5)
POTASSIUM SERPL-SCNC: 5.5 MMOL/L (ref 3.5–5)
POTASSIUM SERPL-SCNC: 5.5 MMOL/L (ref 3.5–5)
POTASSIUM SERPL-SCNC: 5.6 MMOL/L (ref 3.5–5)
POTASSIUM SERPL-SCNC: 5.7 MMOL/L (ref 3.5–5)
POTASSIUM SERPL-SCNC: 6.4 MMOL/L (ref 3.5–5)
POTASSIUM, UR: 44.2 MMOL/L
PRO-BNP: 1226 PG/ML (ref 0–450)
PRO-BNP: 1272 PG/ML (ref 0–450)
PROCALCITONIN: 0.14 NG/ML (ref 0–0.08)
PROCALCITONIN: 0.32 NG/ML (ref 0–0.08)
PROCALCITONIN: 0.4 NG/ML (ref 0–0.08)
PROCALCITONIN: 0.7 NG/ML (ref 0–0.08)
PROTHROMBIN TIME: 16.7 SEC (ref 9.3–12.4)
PROTHROMBIN TIME: 25 SEC (ref 9.3–12.4)
RBC # BLD: 3.19 E12/L (ref 3.5–5.5)
RBC # BLD: 3.28 E12/L (ref 3.5–5.5)
RBC # BLD: 3.4 E12/L (ref 3.5–5.5)
RBC # BLD: 3.52 E12/L (ref 3.5–5.5)
RBC # BLD: 3.53 E12/L (ref 3.5–5.5)
RBC # BLD: 3.65 E12/L (ref 3.5–5.5)
RBC # BLD: 4.02 E12/L (ref 3.5–5.5)
RBC # BLD: 4.13 E12/L (ref 3.5–5.5)
RBC # BLD: 4.28 E12/L (ref 3.5–5.5)
RBC # BLD: 4.41 E12/L (ref 3.5–5.5)
REPORT: NORMAL
REPORT: NORMAL
RESPIRATORY RATE: 20 B/MIN
RESPIRATORY SYNCYTIAL VIRUS BY PCR: NOT DETECTED
RETIC HGB EQUIVALENT: 27.9 PG (ref 28.2–36.6)
RETICULOCYTE ABSOLUTE COUNT: 0.04 E12/L
RETICULOCYTE COUNT PCT: 1.1 % (ref 0.4–1.9)
RI(T): 1.62
RI(T): 10.02
RI(T): 4.04
RI(T): 4.24
RI(T): 4.65
RR MECHANICAL: 20 B/MIN
SARS-COV-2, NAAT: DETECTED
SARS-COV-2, NAAT: DETECTED
SARS-COV-2: DETECTED
SMEAR, RESPIRATORY: NORMAL
SODIUM BLD-SCNC: 130 MMOL/L (ref 132–146)
SODIUM BLD-SCNC: 131 MMOL/L (ref 132–146)
SODIUM BLD-SCNC: 132 MMOL/L (ref 132–146)
SODIUM BLD-SCNC: 132 MMOL/L (ref 132–146)
SODIUM BLD-SCNC: 133 MMOL/L (ref 132–146)
SODIUM BLD-SCNC: 133 MMOL/L (ref 132–146)
SODIUM BLD-SCNC: 134 MMOL/L (ref 132–146)
SODIUM BLD-SCNC: 135 MMOL/L (ref 132–146)
SODIUM BLD-SCNC: 135 MMOL/L (ref 132–146)
SODIUM BLD-SCNC: 136 MMOL/L (ref 132–146)
SODIUM BLD-SCNC: 137 MMOL/L (ref 132–146)
SODIUM BLD-SCNC: 138 MMOL/L (ref 132–146)
SODIUM BLD-SCNC: 139 MMOL/L (ref 132–146)
SODIUM BLD-SCNC: 139 MMOL/L (ref 132–146)
SODIUM BLD-SCNC: 141 MMOL/L (ref 132–146)
SODIUM BLD-SCNC: 142 MMOL/L (ref 132–146)
SODIUM BLD-SCNC: 144 MMOL/L (ref 132–146)
SODIUM URINE: 85 MMOL/L
SOURCE, BLOOD GAS: ABNORMAL
SOURCE: ABNORMAL
STREP PNEUMONIAE ANTIGEN, URINE: NORMAL
T4 FREE: 0.92 NG/DL (ref 0.93–1.7)
TARGET CELLS: ABNORMAL
TEAR DROP CELLS: ABNORMAL
TEAR DROP CELLS: ABNORMAL
THB: 11.3 G/DL (ref 11.5–16.5)
THB: 11.3 G/DL (ref 11.5–16.5)
THB: 11.5 G/DL (ref 11.5–16.5)
THB: 12.4 G/DL (ref 11.5–16.5)
THB: 12.6 G/DL (ref 11.5–16.5)
THB: 12.9 G/DL (ref 11.5–16.5)
THB: 9.8 G/DL (ref 11.5–16.5)
THIS TEST SENT TO: NORMAL
THIS TEST SENT TO: NORMAL
TIME ANALYZED: 1147
TIME ANALYZED: 1847
TIME ANALYZED: 442
TIME ANALYZED: 632
TIME ANALYZED: 658
TIME ANALYZED: 713
TIME ANALYZED: 819
TOTAL PROTEIN: 6 G/DL (ref 6.4–8.3)
TOTAL PROTEIN: 6.7 G/DL (ref 6.4–8.3)
TOTAL PROTEIN: 6.8 G/DL (ref 6.4–8.3)
TOTAL PROTEIN: 6.8 G/DL (ref 6.4–8.3)
TOTAL PROTEIN: 6.9 G/DL (ref 6.4–8.3)
TOTAL PROTEIN: 6.9 G/DL (ref 6.4–8.3)
TOTAL PROTEIN: 7 G/DL (ref 6.4–8.3)
TOTAL PROTEIN: 7.4 G/DL (ref 6.4–8.3)
TOTAL PROTEIN: 7.6 G/DL (ref 6.4–8.3)
TOTAL PROTEIN: 7.6 G/DL (ref 6.4–8.3)
TOTAL PROTEIN: 8 G/DL (ref 6.4–8.3)
TRIGL SERPL-MCNC: 201 MG/DL (ref 0–149)
TRIGL SERPL-MCNC: 27 MG/DL (ref 0–149)
TROPONIN: 0.02 NG/ML (ref 0–0.03)
TROPONIN: 0.03 NG/ML (ref 0–0.03)
TSH SERPL DL<=0.05 MIU/L-ACNC: 8.88 UIU/ML (ref 0.27–4.2)
VACUOLATED NEUTROPHILS: ABNORMAL
VANCOMYCIN RANDOM: 14.8 MCG/ML (ref 5–40)
VANCOMYCIN RANDOM: 21.8 MCG/ML (ref 5–40)
VANCOMYCIN RANDOM: 23.5 MCG/ML (ref 5–40)
VANCOMYCIN RANDOM: 23.7 MCG/ML (ref 5–40)
VANCOMYCIN RANDOM: 9.4 MCG/ML (ref 5–40)
VANCOMYCIN TROUGH: 17.2 MCG/ML (ref 5–16)
VANCOMYCIN TROUGH: 21.1 MCG/ML (ref 5–16)
VANCOMYCIN TROUGH: 25.2 MCG/ML (ref 5–16)
VANCOMYCIN TROUGH: 31.6 MCG/ML (ref 5–16)
VLDLC SERPL CALC-MCNC: 5 MG/DL
VT MECHANICAL: 360 ML
VT MECHANICAL: 360 ML
WBC # BLD: 10.5 E9/L (ref 4.5–11.5)
WBC # BLD: 2.7 E9/L (ref 4.5–11.5)
WBC # BLD: 3.2 E9/L (ref 4.5–11.5)
WBC # BLD: 3.5 E9/L (ref 4.5–11.5)
WBC # BLD: 4.3 E9/L (ref 4.5–11.5)
WBC # BLD: 4.9 E9/L (ref 4.5–11.5)
WBC # BLD: 6.5 E9/L (ref 4.5–11.5)
WBC # BLD: 6.6 E9/L (ref 4.5–11.5)
WBC # BLD: 7.4 E9/L (ref 4.5–11.5)
WBC # BLD: 9.3 E9/L (ref 4.5–11.5)
WOUND/ABSCESS: ABNORMAL
WOUND/ABSCESS: NORMAL

## 2020-01-01 PROCEDURE — 2580000003 HC RX 258: Performed by: INTERNAL MEDICINE

## 2020-01-01 PROCEDURE — 94003 VENT MGMT INPAT SUBQ DAY: CPT

## 2020-01-01 PROCEDURE — 82728 ASSAY OF FERRITIN: CPT

## 2020-01-01 PROCEDURE — 6360000002 HC RX W HCPCS: Performed by: SPECIALIST

## 2020-01-01 PROCEDURE — 85384 FIBRINOGEN ACTIVITY: CPT

## 2020-01-01 PROCEDURE — 6370000000 HC RX 637 (ALT 250 FOR IP): Performed by: INTERNAL MEDICINE

## 2020-01-01 PROCEDURE — 36620 INSERTION CATHETER ARTERY: CPT

## 2020-01-01 PROCEDURE — 85025 COMPLETE CBC W/AUTO DIFF WBC: CPT

## 2020-01-01 PROCEDURE — 97530 THERAPEUTIC ACTIVITIES: CPT | Performed by: PHYSICAL THERAPIST

## 2020-01-01 PROCEDURE — 83735 ASSAY OF MAGNESIUM: CPT

## 2020-01-01 PROCEDURE — 2580000003 HC RX 258: Performed by: SPECIALIST

## 2020-01-01 PROCEDURE — 6360000002 HC RX W HCPCS: Performed by: INTERNAL MEDICINE

## 2020-01-01 PROCEDURE — 85730 THROMBOPLASTIN TIME PARTIAL: CPT

## 2020-01-01 PROCEDURE — 80053 COMPREHEN METABOLIC PANEL: CPT

## 2020-01-01 PROCEDURE — 80202 ASSAY OF VANCOMYCIN: CPT

## 2020-01-01 PROCEDURE — 2500000003 HC RX 250 WO HCPCS: Performed by: INTERNAL MEDICINE

## 2020-01-01 PROCEDURE — 82962 GLUCOSE BLOOD TEST: CPT

## 2020-01-01 PROCEDURE — 84100 ASSAY OF PHOSPHORUS: CPT

## 2020-01-01 PROCEDURE — 2700000000 HC OXYGEN THERAPY PER DAY

## 2020-01-01 PROCEDURE — 71045 X-RAY EXAM CHEST 1 VIEW: CPT

## 2020-01-01 PROCEDURE — 83615 LACTATE (LD) (LDH) ENZYME: CPT

## 2020-01-01 PROCEDURE — 37799 UNLISTED PX VASCULAR SURGERY: CPT

## 2020-01-01 PROCEDURE — 85378 FIBRIN DEGRADE SEMIQUANT: CPT

## 2020-01-01 PROCEDURE — 99221 1ST HOSP IP/OBS SF/LOW 40: CPT | Performed by: NURSE PRACTITIONER

## 2020-01-01 PROCEDURE — C9113 INJ PANTOPRAZOLE SODIUM, VIA: HCPCS | Performed by: INTERNAL MEDICINE

## 2020-01-01 PROCEDURE — 84145 PROCALCITONIN (PCT): CPT

## 2020-01-01 PROCEDURE — 2000000000 HC ICU R&B

## 2020-01-01 PROCEDURE — 80048 BASIC METABOLIC PNL TOTAL CA: CPT

## 2020-01-01 PROCEDURE — 97165 OT EVAL LOW COMPLEX 30 MIN: CPT

## 2020-01-01 PROCEDURE — 83520 IMMUNOASSAY QUANT NOS NONAB: CPT

## 2020-01-01 PROCEDURE — 36430 TRANSFUSION BLD/BLD COMPNT: CPT

## 2020-01-01 PROCEDURE — U0003 INFECTIOUS AGENT DETECTION BY NUCLEIC ACID (DNA OR RNA); SEVERE ACUTE RESPIRATORY SYNDROME CORONAVIRUS 2 (SARS-COV-2) (CORONAVIRUS DISEASE [COVID-19]), AMPLIFIED PROBE TECHNIQUE, MAKING USE OF HIGH THROUGHPUT TECHNOLOGIES AS DESCRIBED BY CMS-2020-01-R: HCPCS

## 2020-01-01 PROCEDURE — 82803 BLOOD GASES ANY COMBINATION: CPT

## 2020-01-01 PROCEDURE — 94640 AIRWAY INHALATION TREATMENT: CPT

## 2020-01-01 PROCEDURE — 0100U HC RESPIRPTHGN MULT REV TRANS & AMP PRB TECH 21 TRGT: CPT

## 2020-01-01 PROCEDURE — 31500 INSERT EMERGENCY AIRWAY: CPT

## 2020-01-01 PROCEDURE — 87040 BLOOD CULTURE FOR BACTERIA: CPT

## 2020-01-01 PROCEDURE — 2060000000 HC ICU INTERMEDIATE R&B

## 2020-01-01 PROCEDURE — 2500000003 HC RX 250 WO HCPCS

## 2020-01-01 PROCEDURE — 76775 US EXAM ABDO BACK WALL LIM: CPT

## 2020-01-01 PROCEDURE — 93308 TTE F-UP OR LMTD: CPT

## 2020-01-01 PROCEDURE — 83880 ASSAY OF NATRIURETIC PEPTIDE: CPT

## 2020-01-01 PROCEDURE — 11042 DBRDMT SUBQ TIS 1ST 20SQCM/<: CPT

## 2020-01-01 PROCEDURE — 97530 THERAPEUTIC ACTIVITIES: CPT

## 2020-01-01 PROCEDURE — 82805 BLOOD GASES W/O2 SATURATION: CPT

## 2020-01-01 PROCEDURE — 86140 C-REACTIVE PROTEIN: CPT

## 2020-01-01 PROCEDURE — 99233 SBSQ HOSP IP/OBS HIGH 50: CPT | Performed by: INTERNAL MEDICINE

## 2020-01-01 PROCEDURE — 74018 RADEX ABDOMEN 1 VIEW: CPT

## 2020-01-01 PROCEDURE — 2720000010 HC SURG SUPPLY STERILE

## 2020-01-01 PROCEDURE — 99231 SBSQ HOSP IP/OBS SF/LOW 25: CPT | Performed by: INTERNAL MEDICINE

## 2020-01-01 PROCEDURE — 94002 VENT MGMT INPAT INIT DAY: CPT

## 2020-01-01 PROCEDURE — 36600 WITHDRAWAL OF ARTERIAL BLOOD: CPT

## 2020-01-01 PROCEDURE — 87450 HC DIRECT STREP B ANTIGEN: CPT

## 2020-01-01 PROCEDURE — 76937 US GUIDE VASCULAR ACCESS: CPT

## 2020-01-01 PROCEDURE — 85014 HEMATOCRIT: CPT

## 2020-01-01 PROCEDURE — 92526 ORAL FUNCTION THERAPY: CPT | Performed by: SPEECH-LANGUAGE PATHOLOGIST

## 2020-01-01 PROCEDURE — 84478 ASSAY OF TRIGLYCERIDES: CPT

## 2020-01-01 PROCEDURE — 84439 ASSAY OF FREE THYROXINE: CPT

## 2020-01-01 PROCEDURE — 36415 COLL VENOUS BLD VENIPUNCTURE: CPT

## 2020-01-01 PROCEDURE — 97110 THERAPEUTIC EXERCISES: CPT | Performed by: PHYSICAL THERAPIST

## 2020-01-01 PROCEDURE — 94664 DEMO&/EVAL PT USE INHALER: CPT

## 2020-01-01 PROCEDURE — 36592 COLLECT BLOOD FROM PICC: CPT

## 2020-01-01 PROCEDURE — 84133 ASSAY OF URINE POTASSIUM: CPT

## 2020-01-01 PROCEDURE — 83605 ASSAY OF LACTIC ACID: CPT

## 2020-01-01 PROCEDURE — 86850 RBC ANTIBODY SCREEN: CPT

## 2020-01-01 PROCEDURE — 93306 TTE W/DOPPLER COMPLETE: CPT

## 2020-01-01 PROCEDURE — 87205 SMEAR GRAM STAIN: CPT

## 2020-01-01 PROCEDURE — 87070 CULTURE OTHR SPECIMN AEROBIC: CPT

## 2020-01-01 PROCEDURE — 94669 MECHANICAL CHEST WALL OSCILL: CPT

## 2020-01-01 PROCEDURE — 85027 COMPLETE CBC AUTOMATED: CPT

## 2020-01-01 PROCEDURE — 97161 PT EVAL LOW COMPLEX 20 MIN: CPT | Performed by: PHYSICAL THERAPIST

## 2020-01-01 PROCEDURE — U0002 COVID-19 LAB TEST NON-CDC: HCPCS

## 2020-01-01 PROCEDURE — C1751 CATH, INF, PER/CENT/MIDLINE: HCPCS

## 2020-01-01 PROCEDURE — 84484 ASSAY OF TROPONIN QUANT: CPT

## 2020-01-01 PROCEDURE — 82570 ASSAY OF URINE CREATININE: CPT

## 2020-01-01 PROCEDURE — 11044 DBRDMT BONE 1ST 20 SQ CM/<: CPT

## 2020-01-01 PROCEDURE — 87075 CULTR BACTERIA EXCEPT BLOOD: CPT

## 2020-01-01 PROCEDURE — 84443 ASSAY THYROID STIM HORMONE: CPT

## 2020-01-01 PROCEDURE — 85610 PROTHROMBIN TIME: CPT

## 2020-01-01 PROCEDURE — 11044 DBRDMT BONE 1ST 20 SQ CM/<: CPT | Performed by: FAMILY MEDICINE

## 2020-01-01 PROCEDURE — 85045 AUTOMATED RETICULOCYTE COUNT: CPT

## 2020-01-01 PROCEDURE — 6360000002 HC RX W HCPCS

## 2020-01-01 PROCEDURE — 97535 SELF CARE MNGMENT TRAINING: CPT

## 2020-01-01 PROCEDURE — 82248 BILIRUBIN DIRECT: CPT

## 2020-01-01 PROCEDURE — 84300 ASSAY OF URINE SODIUM: CPT

## 2020-01-01 PROCEDURE — 99291 CRITICAL CARE FIRST HOUR: CPT | Performed by: INTERNAL MEDICINE

## 2020-01-01 PROCEDURE — 0BH17EZ INSERTION OF ENDOTRACHEAL AIRWAY INTO TRACHEA, VIA NATURAL OR ARTIFICIAL OPENING: ICD-10-PCS | Performed by: INTERNAL MEDICINE

## 2020-01-01 PROCEDURE — 82533 TOTAL CORTISOL: CPT

## 2020-01-01 PROCEDURE — 93005 ELECTROCARDIOGRAM TRACING: CPT | Performed by: STUDENT IN AN ORGANIZED HEALTH CARE EDUCATION/TRAINING PROGRAM

## 2020-01-01 PROCEDURE — 92610 EVALUATE SWALLOWING FUNCTION: CPT | Performed by: SPEECH-LANGUAGE PATHOLOGIST

## 2020-01-01 PROCEDURE — 87206 SMEAR FLUORESCENT/ACID STAI: CPT

## 2020-01-01 PROCEDURE — 99223 1ST HOSP IP/OBS HIGH 75: CPT | Performed by: INTERNAL MEDICINE

## 2020-01-01 PROCEDURE — 85018 HEMOGLOBIN: CPT

## 2020-01-01 PROCEDURE — 83036 HEMOGLOBIN GLYCOSYLATED A1C: CPT

## 2020-01-01 PROCEDURE — 93010 ELECTROCARDIOGRAM REPORT: CPT | Performed by: INTERNAL MEDICINE

## 2020-01-01 PROCEDURE — 86900 BLOOD TYPING SEROLOGIC ABO: CPT

## 2020-01-01 PROCEDURE — P9059 PLASMA, FRZ BETWEEN 8-24HOUR: HCPCS

## 2020-01-01 PROCEDURE — 86901 BLOOD TYPING SEROLOGIC RH(D): CPT

## 2020-01-01 PROCEDURE — 36620 INSERTION CATHETER ARTERY: CPT | Performed by: INTERNAL MEDICINE

## 2020-01-01 PROCEDURE — 11042 DBRDMT SUBQ TIS 1ST 20SQCM/<: CPT | Performed by: FAMILY MEDICINE

## 2020-01-01 PROCEDURE — 36591 DRAW BLOOD OFF VENOUS DEVICE: CPT

## 2020-01-01 PROCEDURE — 80061 LIPID PANEL: CPT

## 2020-01-01 PROCEDURE — 1200000000 HC SEMI PRIVATE

## 2020-01-01 PROCEDURE — 02HV33Z INSERTION OF INFUSION DEVICE INTO SUPERIOR VENA CAVA, PERCUTANEOUS APPROACH: ICD-10-PCS | Performed by: INTERNAL MEDICINE

## 2020-01-01 PROCEDURE — 99285 EMERGENCY DEPT VISIT HI MDM: CPT

## 2020-01-01 PROCEDURE — 5A1955Z RESPIRATORY VENTILATION, GREATER THAN 96 CONSECUTIVE HOURS: ICD-10-PCS | Performed by: INTERNAL MEDICINE

## 2020-01-01 PROCEDURE — 71250 CT THORAX DX C-: CPT

## 2020-01-01 PROCEDURE — 36569 INSJ PICC 5 YR+ W/O IMAGING: CPT

## 2020-01-01 PROCEDURE — 82436 ASSAY OF URINE CHLORIDE: CPT

## 2020-01-01 PROCEDURE — 87186 SC STD MICRODIL/AGAR DIL: CPT

## 2020-01-01 PROCEDURE — 94660 CPAP INITIATION&MGMT: CPT

## 2020-01-01 PROCEDURE — 87077 CULTURE AEROBIC IDENTIFY: CPT

## 2020-01-01 RX ORDER — DEXTROSE MONOHYDRATE 25 G/50ML
25 INJECTION, SOLUTION INTRAVENOUS PRN
Status: DISCONTINUED | OUTPATIENT
Start: 2020-01-01 | End: 2020-01-01 | Stop reason: SDUPTHER

## 2020-01-01 RX ORDER — FLUCONAZOLE 2 MG/ML
200 INJECTION, SOLUTION INTRAVENOUS EVERY 24 HOURS
Status: DISCONTINUED | OUTPATIENT
Start: 2020-01-01 | End: 2020-01-01

## 2020-01-01 RX ORDER — FUROSEMIDE 20 MG/1
20 TABLET ORAL 2 TIMES DAILY
Status: DISCONTINUED | OUTPATIENT
Start: 2020-01-01 | End: 2020-01-01

## 2020-01-01 RX ORDER — OYSTER SHELL CALCIUM WITH VITAMIN D 500; 200 MG/1; [IU]/1
1 TABLET, FILM COATED ORAL DAILY
Status: DISCONTINUED | OUTPATIENT
Start: 2020-01-01 | End: 2020-01-01

## 2020-01-01 RX ORDER — DOXYCYCLINE HYCLATE 100 MG/1
100 CAPSULE ORAL EVERY 12 HOURS SCHEDULED
Status: DISCONTINUED | OUTPATIENT
Start: 2020-01-01 | End: 2020-01-01

## 2020-01-01 RX ORDER — FUROSEMIDE 10 MG/ML
20 INJECTION INTRAMUSCULAR; INTRAVENOUS 2 TIMES DAILY
Status: DISCONTINUED | OUTPATIENT
Start: 2020-01-01 | End: 2020-01-01

## 2020-01-01 RX ORDER — SODIUM CHLORIDE 9 MG/ML
INJECTION, SOLUTION INTRAVENOUS CONTINUOUS
Status: ACTIVE | OUTPATIENT
Start: 2020-01-01 | End: 2020-01-01

## 2020-01-01 RX ORDER — CARVEDILOL 6.25 MG/1
6.25 TABLET ORAL 2 TIMES DAILY WITH MEALS
Status: DISCONTINUED | OUTPATIENT
Start: 2020-01-01 | End: 2020-01-01

## 2020-01-01 RX ORDER — DEXTROSE MONOHYDRATE 25 G/50ML
25 INJECTION, SOLUTION INTRAVENOUS ONCE
Status: COMPLETED | OUTPATIENT
Start: 2020-01-01 | End: 2020-01-01

## 2020-01-01 RX ORDER — CARVEDILOL 6.25 MG/1
6.25 TABLET ORAL 2 TIMES DAILY WITH MEALS
COMMUNITY

## 2020-01-01 RX ORDER — AZITHROMYCIN 250 MG/1
500 TABLET, FILM COATED ORAL ONCE
Status: DISCONTINUED | OUTPATIENT
Start: 2020-01-01 | End: 2020-01-01

## 2020-01-01 RX ORDER — INSULIN DETEMIR 100 [IU]/ML
60 INJECTION, SOLUTION SUBCUTANEOUS EVERY MORNING
COMMUNITY

## 2020-01-01 RX ORDER — INSULIN GLARGINE 100 [IU]/ML
9 INJECTION, SOLUTION SUBCUTANEOUS NIGHTLY
Status: DISCONTINUED | OUTPATIENT
Start: 2020-01-01 | End: 2020-01-01 | Stop reason: SDUPTHER

## 2020-01-01 RX ORDER — HEPARIN SODIUM (PORCINE) LOCK FLUSH IV SOLN 100 UNIT/ML 100 UNIT/ML
3 SOLUTION INTRAVENOUS EVERY 12 HOURS SCHEDULED
Status: DISCONTINUED | OUTPATIENT
Start: 2020-01-01 | End: 2020-01-01

## 2020-01-01 RX ORDER — SERTRALINE HYDROCHLORIDE 25 MG/1
25 TABLET, FILM COATED ORAL NIGHTLY
Status: DISCONTINUED | OUTPATIENT
Start: 2020-01-01 | End: 2020-01-01

## 2020-01-01 RX ORDER — SODIUM CHLORIDE 9 MG/ML
INJECTION, SOLUTION INTRAVENOUS EVERY 8 HOURS
Status: DISCONTINUED | OUTPATIENT
Start: 2020-01-01 | End: 2020-01-01 | Stop reason: ALTCHOICE

## 2020-01-01 RX ORDER — ACETAMINOPHEN 325 MG/1
650 TABLET ORAL ONCE
Status: DISCONTINUED | OUTPATIENT
Start: 2020-01-01 | End: 2020-01-01 | Stop reason: HOSPADM

## 2020-01-01 RX ORDER — MIDAZOLAM HYDROCHLORIDE 5 MG/ML
INJECTION INTRAMUSCULAR; INTRAVENOUS
Status: COMPLETED
Start: 2020-01-01 | End: 2020-01-01

## 2020-01-01 RX ORDER — SODIUM CHLORIDE 9 MG/ML
INJECTION, SOLUTION INTRAVENOUS ONCE
Status: COMPLETED | OUTPATIENT
Start: 2020-01-01 | End: 2020-01-01

## 2020-01-01 RX ORDER — SIMVASTATIN 20 MG
20 TABLET ORAL NIGHTLY
Status: DISCONTINUED | OUTPATIENT
Start: 2020-01-01 | End: 2020-01-01 | Stop reason: CLARIF

## 2020-01-01 RX ORDER — DEXTROSE MONOHYDRATE 50 MG/ML
100 INJECTION, SOLUTION INTRAVENOUS PRN
Status: DISCONTINUED | OUTPATIENT
Start: 2020-01-01 | End: 2020-01-01 | Stop reason: HOSPADM

## 2020-01-01 RX ORDER — 0.9 % SODIUM CHLORIDE 0.9 %
20 INTRAVENOUS SOLUTION INTRAVENOUS ONCE
Status: DISCONTINUED | OUTPATIENT
Start: 2020-01-01 | End: 2020-01-01

## 2020-01-01 RX ORDER — INSULIN GLARGINE 100 [IU]/ML
6 INJECTION, SOLUTION SUBCUTANEOUS NIGHTLY
Status: DISCONTINUED | OUTPATIENT
Start: 2020-01-01 | End: 2020-01-01

## 2020-01-01 RX ORDER — SODIUM CHLORIDE 9 MG/ML
INJECTION, SOLUTION INTRAVENOUS CONTINUOUS
Status: DISCONTINUED | OUTPATIENT
Start: 2020-01-01 | End: 2020-01-01

## 2020-01-01 RX ORDER — CALCIUM CARBONATE 500(1250)
500 TABLET ORAL DAILY
COMMUNITY
End: 2020-01-01 | Stop reason: ALTCHOICE

## 2020-01-01 RX ORDER — SODIUM CHLORIDE 9 MG/ML
12.5 INJECTION, SOLUTION INTRAVENOUS EVERY 8 HOURS
Status: DISCONTINUED | OUTPATIENT
Start: 2020-01-01 | End: 2020-01-01

## 2020-01-01 RX ORDER — FUROSEMIDE 10 MG/ML
40 INJECTION INTRAMUSCULAR; INTRAVENOUS ONCE
Status: DISCONTINUED | OUTPATIENT
Start: 2020-01-01 | End: 2020-01-01 | Stop reason: ALTCHOICE

## 2020-01-01 RX ORDER — POLYETHYLENE GLYCOL 3350 17 G/17G
17 POWDER, FOR SOLUTION ORAL 2 TIMES DAILY
Status: DISCONTINUED | OUTPATIENT
Start: 2020-01-01 | End: 2020-01-01

## 2020-01-01 RX ORDER — NICOTINE POLACRILEX 4 MG
15 LOZENGE BUCCAL PRN
Status: DISCONTINUED | OUTPATIENT
Start: 2020-01-01 | End: 2020-01-01 | Stop reason: HOSPADM

## 2020-01-01 RX ORDER — LEVOTHYROXINE SODIUM ANHYDROUS 100 UG/5ML
25 INJECTION, POWDER, LYOPHILIZED, FOR SOLUTION INTRAVENOUS DAILY
Status: DISCONTINUED | OUTPATIENT
Start: 2020-01-01 | End: 2020-01-01

## 2020-01-01 RX ORDER — POTASSIUM CHLORIDE 750 MG/1
10 TABLET, EXTENDED RELEASE ORAL 2 TIMES DAILY
Status: DISCONTINUED | OUTPATIENT
Start: 2020-01-01 | End: 2020-01-01

## 2020-01-01 RX ORDER — FOLIC ACID 1 MG/1
1 TABLET ORAL DAILY
Status: DISCONTINUED | OUTPATIENT
Start: 2020-01-01 | End: 2020-01-01

## 2020-01-01 RX ORDER — DEXTROSE MONOHYDRATE 25 G/50ML
25 INJECTION, SOLUTION INTRAVENOUS ONCE
Status: DISCONTINUED | OUTPATIENT
Start: 2020-01-01 | End: 2020-01-01

## 2020-01-01 RX ORDER — ETOMIDATE 2 MG/ML
20 INJECTION INTRAVENOUS ONCE
Status: COMPLETED | OUTPATIENT
Start: 2020-01-01 | End: 2020-01-01

## 2020-01-01 RX ORDER — METHYLPREDNISOLONE SODIUM SUCCINATE 125 MG/2ML
125 INJECTION, POWDER, LYOPHILIZED, FOR SOLUTION INTRAMUSCULAR; INTRAVENOUS
Status: DISPENSED | OUTPATIENT
Start: 2020-01-01 | End: 2020-01-01

## 2020-01-01 RX ORDER — DEXTROSE, SODIUM CHLORIDE, SODIUM LACTATE, POTASSIUM CHLORIDE, AND CALCIUM CHLORIDE 5; .6; .31; .03; .02 G/100ML; G/100ML; G/100ML; G/100ML; G/100ML
INJECTION, SOLUTION INTRAVENOUS CONTINUOUS
Status: DISCONTINUED | OUTPATIENT
Start: 2020-01-01 | End: 2020-01-01

## 2020-01-01 RX ORDER — DEXTROSE MONOHYDRATE 50 MG/ML
100 INJECTION, SOLUTION INTRAVENOUS PRN
Status: DISCONTINUED | OUTPATIENT
Start: 2020-01-01 | End: 2020-01-01 | Stop reason: SDUPTHER

## 2020-01-01 RX ORDER — DOPAMINE HYDROCHLORIDE 320 MG/100ML
2.5 INJECTION, SOLUTION INTRAVENOUS CONTINUOUS
Status: DISCONTINUED | OUTPATIENT
Start: 2020-01-01 | End: 2020-01-01

## 2020-01-01 RX ORDER — MORPHINE SULFATE 2 MG/ML
1 INJECTION, SOLUTION INTRAMUSCULAR; INTRAVENOUS ONCE
Status: COMPLETED | OUTPATIENT
Start: 2020-01-01 | End: 2020-01-01

## 2020-01-01 RX ORDER — IPRATROPIUM BROMIDE AND ALBUTEROL SULFATE 2.5; .5 MG/3ML; MG/3ML
1 SOLUTION RESPIRATORY (INHALATION) EVERY 4 HOURS
Status: DISCONTINUED | OUTPATIENT
Start: 2020-01-01 | End: 2020-01-01

## 2020-01-01 RX ORDER — MAGNESIUM SULFATE IN WATER 40 MG/ML
2 INJECTION, SOLUTION INTRAVENOUS ONCE
Status: COMPLETED | OUTPATIENT
Start: 2020-01-01 | End: 2020-01-01

## 2020-01-01 RX ORDER — FUROSEMIDE 10 MG/ML
INJECTION INTRAMUSCULAR; INTRAVENOUS
Status: COMPLETED
Start: 2020-01-01 | End: 2020-01-01

## 2020-01-01 RX ORDER — SODIUM CHLORIDE 0.9 % (FLUSH) 0.9 %
10 SYRINGE (ML) INJECTION PRN
Status: DISCONTINUED | OUTPATIENT
Start: 2020-01-01 | End: 2020-01-01 | Stop reason: HOSPADM

## 2020-01-01 RX ORDER — INSULIN GLARGINE 100 [IU]/ML
18 INJECTION, SOLUTION SUBCUTANEOUS NIGHTLY
Status: DISCONTINUED | OUTPATIENT
Start: 2020-01-01 | End: 2020-01-01

## 2020-01-01 RX ORDER — SODIUM POLYSTYRENE SULFONATE 15 G/60ML
15 SUSPENSION ORAL; RECTAL ONCE
Status: DISCONTINUED | OUTPATIENT
Start: 2020-01-01 | End: 2020-01-01

## 2020-01-01 RX ORDER — SODIUM CHLORIDE 0.9 % (FLUSH) 0.9 %
10 SYRINGE (ML) INJECTION PRN
Status: DISCONTINUED | OUTPATIENT
Start: 2020-01-01 | End: 2020-01-01 | Stop reason: SDUPTHER

## 2020-01-01 RX ORDER — CALCIUM GLUCONATE 94 MG/ML
1 INJECTION, SOLUTION INTRAVENOUS ONCE
Status: COMPLETED | OUTPATIENT
Start: 2020-01-01 | End: 2020-01-01

## 2020-01-01 RX ORDER — CYCLOBENZAPRINE HCL 5 MG
5 TABLET ORAL DAILY
COMMUNITY
End: 2020-01-01 | Stop reason: ALTCHOICE

## 2020-01-01 RX ORDER — HEPARIN SODIUM 10000 [USP'U]/100ML
8.5 INJECTION, SOLUTION INTRAVENOUS CONTINUOUS
Status: DISCONTINUED | OUTPATIENT
Start: 2020-01-01 | End: 2020-01-01

## 2020-01-01 RX ORDER — DIPHENHYDRAMINE HYDROCHLORIDE 50 MG/ML
25 INJECTION INTRAMUSCULAR; INTRAVENOUS ONCE
Status: COMPLETED | OUTPATIENT
Start: 2020-01-01 | End: 2020-01-01

## 2020-01-01 RX ORDER — ATORVASTATIN CALCIUM 10 MG/1
10 TABLET, FILM COATED ORAL NIGHTLY
Status: DISCONTINUED | OUTPATIENT
Start: 2020-01-01 | End: 2020-01-01

## 2020-01-01 RX ORDER — LEVOTHYROXINE SODIUM 0.2 MG/1
200 TABLET ORAL
COMMUNITY

## 2020-01-01 RX ORDER — PROPOFOL 10 MG/ML
10 INJECTION, EMULSION INTRAVENOUS
Status: DISCONTINUED | OUTPATIENT
Start: 2020-01-01 | End: 2020-01-01

## 2020-01-01 RX ORDER — MIDAZOLAM HYDROCHLORIDE 1 MG/ML
5 INJECTION INTRAMUSCULAR; INTRAVENOUS ONCE
Status: DISCONTINUED | OUTPATIENT
Start: 2020-01-01 | End: 2020-01-01

## 2020-01-01 RX ORDER — SODIUM CHLORIDE 9 MG/ML
10 INJECTION INTRAVENOUS DAILY
Status: DISCONTINUED | OUTPATIENT
Start: 2020-01-01 | End: 2020-01-01

## 2020-01-01 RX ORDER — SIMVASTATIN 20 MG
20 TABLET ORAL NIGHTLY
COMMUNITY

## 2020-01-01 RX ORDER — PANTOPRAZOLE SODIUM 40 MG/1
40 TABLET, DELAYED RELEASE ORAL 2 TIMES DAILY
COMMUNITY

## 2020-01-01 RX ORDER — 0.9 % SODIUM CHLORIDE 0.9 %
500 INTRAVENOUS SOLUTION INTRAVENOUS ONCE
Status: COMPLETED | OUTPATIENT
Start: 2020-01-01 | End: 2020-01-01

## 2020-01-01 RX ORDER — LEVOTHYROXINE SODIUM 0.2 MG/1
200 TABLET ORAL
Status: DISCONTINUED | OUTPATIENT
Start: 2020-01-01 | End: 2020-01-01

## 2020-01-01 RX ORDER — HEPARIN SODIUM (PORCINE) LOCK FLUSH IV SOLN 100 UNIT/ML 100 UNIT/ML
3 SOLUTION INTRAVENOUS PRN
Status: DISCONTINUED | OUTPATIENT
Start: 2020-01-01 | End: 2020-01-01 | Stop reason: HOSPADM

## 2020-01-01 RX ORDER — PANTOPRAZOLE SODIUM 40 MG/10ML
40 INJECTION, POWDER, LYOPHILIZED, FOR SOLUTION INTRAVENOUS DAILY
Status: DISCONTINUED | OUTPATIENT
Start: 2020-01-01 | End: 2020-01-01

## 2020-01-01 RX ORDER — GUAIFENESIN 600 MG/1
600 TABLET, EXTENDED RELEASE ORAL 2 TIMES DAILY
Status: DISCONTINUED | OUTPATIENT
Start: 2020-01-01 | End: 2020-01-01

## 2020-01-01 RX ORDER — BUDESONIDE 0.5 MG/2ML
0.5 INHALANT ORAL 2 TIMES DAILY
Status: DISCONTINUED | OUTPATIENT
Start: 2020-01-01 | End: 2020-01-01

## 2020-01-01 RX ORDER — CHLORHEXIDINE GLUCONATE 0.12 MG/ML
15 RINSE ORAL 2 TIMES DAILY
Status: DISCONTINUED | OUTPATIENT
Start: 2020-01-01 | End: 2020-01-01

## 2020-01-01 RX ORDER — SERTRALINE HYDROCHLORIDE 100 MG/1
100 TABLET, FILM COATED ORAL DAILY
COMMUNITY
End: 2020-01-01 | Stop reason: DRUGHIGH

## 2020-01-01 RX ORDER — AZITHROMYCIN 250 MG/1
250 TABLET, FILM COATED ORAL DAILY
Status: DISCONTINUED | OUTPATIENT
Start: 2020-01-01 | End: 2020-01-01

## 2020-01-01 RX ORDER — INSULIN GLARGINE 100 [IU]/ML
10 INJECTION, SOLUTION SUBCUTANEOUS NIGHTLY
Status: DISCONTINUED | OUTPATIENT
Start: 2020-01-01 | End: 2020-01-01

## 2020-01-01 RX ORDER — ALBUTEROL SULFATE 90 UG/1
2 AEROSOL, METERED RESPIRATORY (INHALATION) EVERY 4 HOURS PRN
Status: DISCONTINUED | OUTPATIENT
Start: 2020-01-01 | End: 2020-01-01

## 2020-01-01 RX ORDER — LIDOCAINE HYDROCHLORIDE 20 MG/ML
JELLY TOPICAL ONCE
Status: DISCONTINUED | OUTPATIENT
Start: 2020-01-01 | End: 2020-01-01 | Stop reason: HOSPADM

## 2020-01-01 RX ORDER — MIDODRINE HYDROCHLORIDE 5 MG/1
10 TABLET ORAL
Status: DISCONTINUED | OUTPATIENT
Start: 2020-01-01 | End: 2020-01-01

## 2020-01-01 RX ORDER — HEPARIN SODIUM 1000 [USP'U]/ML
4000 INJECTION, SOLUTION INTRAVENOUS; SUBCUTANEOUS PRN
Status: DISCONTINUED | OUTPATIENT
Start: 2020-01-01 | End: 2020-01-01 | Stop reason: HOSPADM

## 2020-01-01 RX ORDER — METOCLOPRAMIDE HYDROCHLORIDE 5 MG/ML
10 INJECTION INTRAMUSCULAR; INTRAVENOUS EVERY 6 HOURS
Status: DISCONTINUED | OUTPATIENT
Start: 2020-01-01 | End: 2020-01-01

## 2020-01-01 RX ORDER — FUROSEMIDE 10 MG/ML
20 INJECTION INTRAMUSCULAR; INTRAVENOUS ONCE
Status: COMPLETED | OUTPATIENT
Start: 2020-01-01 | End: 2020-01-01

## 2020-01-01 RX ORDER — LIDOCAINE HYDROCHLORIDE 40 MG/ML
SOLUTION TOPICAL ONCE
Status: DISCONTINUED | OUTPATIENT
Start: 2020-01-01 | End: 2020-01-01 | Stop reason: HOSPADM

## 2020-01-01 RX ORDER — ACETAMINOPHEN 650 MG/1
650 SUPPOSITORY RECTAL ONCE
Status: COMPLETED | OUTPATIENT
Start: 2020-01-01 | End: 2020-01-01

## 2020-01-01 RX ORDER — HYDROXYCHLOROQUINE SULFATE 200 MG/1
200 TABLET, FILM COATED ORAL EVERY 12 HOURS
Status: COMPLETED | OUTPATIENT
Start: 2020-01-01 | End: 2020-01-01

## 2020-01-01 RX ORDER — GABAPENTIN 100 MG/1
100 CAPSULE ORAL NIGHTLY
Status: DISCONTINUED | OUTPATIENT
Start: 2020-01-01 | End: 2020-01-01

## 2020-01-01 RX ORDER — INSULIN GLARGINE 100 [IU]/ML
12 INJECTION, SOLUTION SUBCUTANEOUS NIGHTLY
Status: DISCONTINUED | OUTPATIENT
Start: 2020-01-01 | End: 2020-01-01

## 2020-01-01 RX ORDER — CARBOXYMETHYLCELLULOSE SODIUM 5 MG/ML
1 SOLUTION/ DROPS OPHTHALMIC 4 TIMES DAILY
COMMUNITY

## 2020-01-01 RX ORDER — PROPOFOL 10 MG/ML
10 INJECTION, EMULSION INTRAVENOUS
Status: DISCONTINUED | OUTPATIENT
Start: 2020-01-01 | End: 2020-01-01 | Stop reason: SDUPTHER

## 2020-01-01 RX ORDER — CEFTRIAXONE 1 G/1
1 INJECTION, POWDER, FOR SOLUTION INTRAMUSCULAR; INTRAVENOUS NIGHTLY
COMMUNITY
Start: 2020-01-01 | End: 2020-01-01

## 2020-01-01 RX ORDER — HEPARIN SODIUM 1000 [USP'U]/ML
2000 INJECTION, SOLUTION INTRAVENOUS; SUBCUTANEOUS PRN
Status: DISCONTINUED | OUTPATIENT
Start: 2020-01-01 | End: 2020-01-01 | Stop reason: HOSPADM

## 2020-01-01 RX ORDER — ZINC GLUCONATE 50 MG
50 TABLET ORAL DAILY
Status: DISCONTINUED | OUTPATIENT
Start: 2020-01-01 | End: 2020-01-01

## 2020-01-01 RX ORDER — LORAZEPAM 2 MG/ML
1 INJECTION INTRAMUSCULAR EVERY 6 HOURS PRN
Status: DISCONTINUED | OUTPATIENT
Start: 2020-01-01 | End: 2020-01-01 | Stop reason: HOSPADM

## 2020-01-01 RX ORDER — DEXTROSE MONOHYDRATE 25 G/50ML
12.5 INJECTION, SOLUTION INTRAVENOUS PRN
Status: DISCONTINUED | OUTPATIENT
Start: 2020-01-01 | End: 2020-01-01 | Stop reason: HOSPADM

## 2020-01-01 RX ORDER — INSULIN GLARGINE 100 [IU]/ML
22 INJECTION, SOLUTION SUBCUTANEOUS NIGHTLY
Status: DISCONTINUED | OUTPATIENT
Start: 2020-01-01 | End: 2020-01-01

## 2020-01-01 RX ORDER — HYDROXYCHLOROQUINE SULFATE 200 MG/1
400 TABLET, FILM COATED ORAL EVERY 12 HOURS
Status: COMPLETED | OUTPATIENT
Start: 2020-01-01 | End: 2020-01-01

## 2020-01-01 RX ORDER — 0.9 % SODIUM CHLORIDE 0.9 %
500 INTRAVENOUS SOLUTION INTRAVENOUS ONCE
Status: DISCONTINUED | OUTPATIENT
Start: 2020-01-01 | End: 2020-01-01

## 2020-01-01 RX ORDER — ALLOPURINOL 100 MG/1
100 TABLET ORAL 2 TIMES DAILY
Status: DISCONTINUED | OUTPATIENT
Start: 2020-01-01 | End: 2020-01-01

## 2020-01-01 RX ORDER — DEXTROSE AND SODIUM CHLORIDE 5; .45 G/100ML; G/100ML
INJECTION, SOLUTION INTRAVENOUS CONTINUOUS
Status: DISCONTINUED | OUTPATIENT
Start: 2020-01-01 | End: 2020-01-01

## 2020-01-01 RX ORDER — NICOTINE POLACRILEX 4 MG
15 LOZENGE BUCCAL PRN
Status: DISCONTINUED | OUTPATIENT
Start: 2020-01-01 | End: 2020-01-01 | Stop reason: SDUPTHER

## 2020-01-01 RX ORDER — SODIUM CHLORIDE 0.9 % (FLUSH) 0.9 %
10 SYRINGE (ML) INJECTION EVERY 12 HOURS SCHEDULED
Status: DISCONTINUED | OUTPATIENT
Start: 2020-01-01 | End: 2020-01-01 | Stop reason: SDUPTHER

## 2020-01-01 RX ORDER — GABAPENTIN 300 MG/1
300 CAPSULE ORAL 3 TIMES DAILY
Status: DISCONTINUED | OUTPATIENT
Start: 2020-01-01 | End: 2020-01-01

## 2020-01-01 RX ORDER — METOCLOPRAMIDE HYDROCHLORIDE 5 MG/ML
5 INJECTION INTRAMUSCULAR; INTRAVENOUS EVERY 6 HOURS
Status: DISCONTINUED | OUTPATIENT
Start: 2020-01-01 | End: 2020-01-01

## 2020-01-01 RX ORDER — PROPOFOL 10 MG/ML
INJECTION, EMULSION INTRAVENOUS
Status: COMPLETED
Start: 2020-01-01 | End: 2020-01-01

## 2020-01-01 RX ORDER — ETOMIDATE 2 MG/ML
INJECTION INTRAVENOUS
Status: COMPLETED
Start: 2020-01-01 | End: 2020-01-01

## 2020-01-01 RX ORDER — INSULIN GLARGINE 100 [IU]/ML
14 INJECTION, SOLUTION SUBCUTANEOUS NIGHTLY
Status: DISCONTINUED | OUTPATIENT
Start: 2020-01-01 | End: 2020-01-01

## 2020-01-01 RX ORDER — LACTOBACILLUS RHAMNOSUS GG 10B CELL
1 CAPSULE ORAL DAILY
Status: DISCONTINUED | OUTPATIENT
Start: 2020-01-01 | End: 2020-01-01

## 2020-01-01 RX ORDER — EPINEPHRINE 1 MG/ML
0.3 INJECTION, SOLUTION, CONCENTRATE INTRAVENOUS
Status: ACTIVE | OUTPATIENT
Start: 2020-01-01 | End: 2020-01-01

## 2020-01-01 RX ORDER — DEXTROSE MONOHYDRATE 25 G/50ML
12.5 INJECTION, SOLUTION INTRAVENOUS PRN
Status: DISCONTINUED | OUTPATIENT
Start: 2020-01-01 | End: 2020-01-01 | Stop reason: SDUPTHER

## 2020-01-01 RX ORDER — SODIUM CHLORIDE 0.9 % (FLUSH) 0.9 %
10 SYRINGE (ML) INJECTION EVERY 12 HOURS SCHEDULED
Status: DISCONTINUED | OUTPATIENT
Start: 2020-01-01 | End: 2020-01-01

## 2020-01-01 RX ORDER — SERTRALINE HYDROCHLORIDE 25 MG/1
25 TABLET, FILM COATED ORAL NIGHTLY
COMMUNITY

## 2020-01-01 RX ORDER — SUCCINYLCHOLINE/SOD CL,ISO/PF 200MG/10ML
SYRINGE (ML) INTRAVENOUS
Status: DISPENSED
Start: 2020-01-01 | End: 2020-01-01

## 2020-01-01 RX ORDER — FUROSEMIDE 10 MG/ML
40 INJECTION INTRAMUSCULAR; INTRAVENOUS 2 TIMES DAILY
Status: DISCONTINUED | OUTPATIENT
Start: 2020-01-01 | End: 2020-01-01

## 2020-01-01 RX ORDER — ATROPINE SULFATE 0.1 MG/ML
0.5 INJECTION INTRAVENOUS ONCE
Status: COMPLETED | OUTPATIENT
Start: 2020-01-01 | End: 2020-01-01

## 2020-01-01 RX ORDER — PANTOPRAZOLE SODIUM 40 MG/10ML
40 INJECTION, POWDER, LYOPHILIZED, FOR SOLUTION INTRAVENOUS 2 TIMES DAILY
Status: DISCONTINUED | OUTPATIENT
Start: 2020-01-01 | End: 2020-01-01

## 2020-01-01 RX ORDER — INSULIN DETEMIR 100 [IU]/ML
20 INJECTION, SOLUTION SUBCUTANEOUS NIGHTLY
COMMUNITY

## 2020-01-01 RX ORDER — PANTOPRAZOLE SODIUM 40 MG/1
40 TABLET, DELAYED RELEASE ORAL
Status: DISCONTINUED | OUTPATIENT
Start: 2020-01-01 | End: 2020-01-01

## 2020-01-01 RX ORDER — ACETAMINOPHEN 325 MG/1
650 TABLET ORAL EVERY 6 HOURS PRN
Status: DISCONTINUED | OUTPATIENT
Start: 2020-01-01 | End: 2020-01-01 | Stop reason: HOSPADM

## 2020-01-01 RX ORDER — INSULIN GLARGINE 100 [IU]/ML
10 INJECTION, SOLUTION SUBCUTANEOUS ONCE
Status: DISCONTINUED | OUTPATIENT
Start: 2020-01-01 | End: 2020-01-01

## 2020-01-01 RX ORDER — ATROPINE SULFATE 0.1 MG/ML
INJECTION INTRAVENOUS
Status: COMPLETED
Start: 2020-01-01 | End: 2020-01-01

## 2020-01-01 RX ORDER — ACETAMINOPHEN 650 MG/1
650 SUPPOSITORY RECTAL EVERY 6 HOURS PRN
Status: DISCONTINUED | OUTPATIENT
Start: 2020-01-01 | End: 2020-01-01 | Stop reason: HOSPADM

## 2020-01-01 RX ORDER — MORPHINE SULFATE 2 MG/ML
1 INJECTION, SOLUTION INTRAMUSCULAR; INTRAVENOUS
Status: DISCONTINUED | OUTPATIENT
Start: 2020-01-01 | End: 2020-01-01 | Stop reason: HOSPADM

## 2020-01-01 RX ORDER — PREDNISONE 1 MG/1
5 TABLET ORAL DAILY
COMMUNITY

## 2020-01-01 RX ORDER — FUROSEMIDE 10 MG/ML
40 INJECTION INTRAMUSCULAR; INTRAVENOUS ONCE
Status: DISCONTINUED | OUTPATIENT
Start: 2020-01-01 | End: 2020-01-01

## 2020-01-01 RX ADMIN — METOCLOPRAMIDE 10 MG: 5 INJECTION, SOLUTION INTRAMUSCULAR; INTRAVENOUS at 13:56

## 2020-01-01 RX ADMIN — LEVOTHYROXINE SODIUM 200 MCG: 200 TABLET ORAL at 06:03

## 2020-01-01 RX ADMIN — ENOXAPARIN SODIUM 120 MG: 120 INJECTION SUBCUTANEOUS at 21:33

## 2020-01-01 RX ADMIN — DIPHENHYDRAMINE HYDROCHLORIDE 25 MG: 50 INJECTION, SOLUTION INTRAMUSCULAR; INTRAVENOUS at 18:14

## 2020-01-01 RX ADMIN — HYDROCORTISONE SODIUM SUCCINATE 50 MG: 100 INJECTION, POWDER, FOR SOLUTION INTRAMUSCULAR; INTRAVENOUS at 20:15

## 2020-01-01 RX ADMIN — DEXTROSE MONOHYDRATE 25 G: 25 INJECTION, SOLUTION INTRAVENOUS at 21:33

## 2020-01-01 RX ADMIN — DEXTROSE MONOHYDRATE 25 G: 25 INJECTION, SOLUTION INTRAVENOUS at 14:03

## 2020-01-01 RX ADMIN — INSULIN LISPRO 4 UNITS: 100 INJECTION, SOLUTION INTRAVENOUS; SUBCUTANEOUS at 00:29

## 2020-01-01 RX ADMIN — Medication 50 MG: at 07:36

## 2020-01-01 RX ADMIN — Medication 50 MG: at 08:46

## 2020-01-01 RX ADMIN — FLUCONAZOLE 200 MG: 200 INJECTION, SOLUTION INTRAVENOUS at 12:58

## 2020-01-01 RX ADMIN — INSULIN LISPRO 2 UNITS: 100 INJECTION, SOLUTION INTRAVENOUS; SUBCUTANEOUS at 11:42

## 2020-01-01 RX ADMIN — PROPOFOL 35 MCG/KG/MIN: 10 INJECTION, EMULSION INTRAVENOUS at 07:56

## 2020-01-01 RX ADMIN — HYDROXYCHLOROQUINE SULFATE 200 MG: 200 TABLET, FILM COATED ORAL at 21:26

## 2020-01-01 RX ADMIN — PANTOPRAZOLE SODIUM 40 MG: 40 TABLET, DELAYED RELEASE ORAL at 05:33

## 2020-01-01 RX ADMIN — METOCLOPRAMIDE 5 MG: 5 INJECTION, SOLUTION INTRAMUSCULAR; INTRAVENOUS at 23:56

## 2020-01-01 RX ADMIN — GABAPENTIN 300 MG: 300 CAPSULE ORAL at 14:54

## 2020-01-01 RX ADMIN — SODIUM CHLORIDE, PRESERVATIVE FREE 10 ML: 5 INJECTION INTRAVENOUS at 07:40

## 2020-01-01 RX ADMIN — MIDODRINE HYDROCHLORIDE 10 MG: 5 TABLET ORAL at 09:30

## 2020-01-01 RX ADMIN — DOXYCYCLINE HYCLATE 100 MG: 100 CAPSULE ORAL at 08:46

## 2020-01-01 RX ADMIN — Medication 75 MCG/HR: at 05:23

## 2020-01-01 RX ADMIN — CEFEPIME 1 G: 1 INJECTION, POWDER, FOR SOLUTION INTRAMUSCULAR; INTRAVENOUS at 06:41

## 2020-01-01 RX ADMIN — ALLOPURINOL 100 MG: 100 TABLET ORAL at 12:11

## 2020-01-01 RX ADMIN — OYSTER SHELL CALCIUM WITH VITAMIN D 1 TABLET: 500; 200 TABLET, FILM COATED ORAL at 09:29

## 2020-01-01 RX ADMIN — ASCORBIC ACID 1500 MG: 500 INJECTION INTRAVENOUS at 05:19

## 2020-01-01 RX ADMIN — CEFEPIME 1 G: 1 INJECTION, POWDER, FOR SOLUTION INTRAMUSCULAR; INTRAVENOUS at 15:22

## 2020-01-01 RX ADMIN — CARVEDILOL 6.25 MG: 6.25 TABLET, FILM COATED ORAL at 22:16

## 2020-01-01 RX ADMIN — DOPAMINE HYDROCHLORIDE 2.5 MCG/KG/MIN: 320 INJECTION, SOLUTION INTRAVENOUS at 14:31

## 2020-01-01 RX ADMIN — SODIUM CHLORIDE, PRESERVATIVE FREE 10 ML: 5 INJECTION INTRAVENOUS at 21:34

## 2020-01-01 RX ADMIN — GUAIFENESIN 600 MG: 600 TABLET, EXTENDED RELEASE ORAL at 20:45

## 2020-01-01 RX ADMIN — METOCLOPRAMIDE 5 MG: 5 INJECTION, SOLUTION INTRAMUSCULAR; INTRAVENOUS at 18:10

## 2020-01-01 RX ADMIN — ASCORBIC ACID 1500 MG: 500 INJECTION INTRAVENOUS at 05:26

## 2020-01-01 RX ADMIN — OYSTER SHELL CALCIUM WITH VITAMIN D 1 TABLET: 500; 200 TABLET, FILM COATED ORAL at 10:06

## 2020-01-01 RX ADMIN — FUROSEMIDE 40 MG: 10 INJECTION, SOLUTION INTRAMUSCULAR; INTRAVENOUS at 08:01

## 2020-01-01 RX ADMIN — METOCLOPRAMIDE 5 MG: 5 INJECTION, SOLUTION INTRAMUSCULAR; INTRAVENOUS at 23:39

## 2020-01-01 RX ADMIN — ALLOPURINOL 100 MG: 100 TABLET ORAL at 20:39

## 2020-01-01 RX ADMIN — Medication 75 MCG/HR: at 23:40

## 2020-01-01 RX ADMIN — ATORVASTATIN CALCIUM 10 MG: 10 TABLET, FILM COATED ORAL at 21:29

## 2020-01-01 RX ADMIN — POLYETHYLENE GLYCOL 3350 17 G: 17 POWDER, FOR SOLUTION ORAL at 11:35

## 2020-01-01 RX ADMIN — SODIUM CHLORIDE, PRESERVATIVE FREE 10 ML: 5 INJECTION INTRAVENOUS at 10:09

## 2020-01-01 RX ADMIN — ALLOPURINOL 100 MG: 100 TABLET ORAL at 21:23

## 2020-01-01 RX ADMIN — POTASSIUM PHOSPHATE, MONOBASIC AND POTASSIUM PHOSPHATE, DIBASIC 30 MMOL: 224; 236 INJECTION, SOLUTION, CONCENTRATE INTRAVENOUS at 22:45

## 2020-01-01 RX ADMIN — PROPOFOL 35 MCG/KG/MIN: 10 INJECTION, EMULSION INTRAVENOUS at 05:19

## 2020-01-01 RX ADMIN — THIAMINE HYDROCHLORIDE 200 MG: 100 INJECTION, SOLUTION INTRAMUSCULAR; INTRAVENOUS at 04:31

## 2020-01-01 RX ADMIN — SERTRALINE HYDROCHLORIDE 25 MG: 25 TABLET ORAL at 21:33

## 2020-01-01 RX ADMIN — HEPARIN 300 UNITS: 100 SYRINGE at 21:24

## 2020-01-01 RX ADMIN — DEXTROSE AND SODIUM CHLORIDE: 5; 450 INJECTION, SOLUTION INTRAVENOUS at 09:26

## 2020-01-01 RX ADMIN — POTASSIUM BICARBONATE 20 MEQ: 782 TABLET, EFFERVESCENT ORAL at 08:21

## 2020-01-01 RX ADMIN — Medication 50 MG: at 09:30

## 2020-01-01 RX ADMIN — INSULIN LISPRO 6 UNITS: 100 INJECTION, SOLUTION INTRAVENOUS; SUBCUTANEOUS at 17:22

## 2020-01-01 RX ADMIN — ASCORBIC ACID 1500 MG: 500 INJECTION INTRAVENOUS at 18:28

## 2020-01-01 RX ADMIN — ANTI-FUNGAL POWDER MICONAZOLE NITRATE TALC FREE: 1.42 POWDER TOPICAL at 07:43

## 2020-01-01 RX ADMIN — POLYETHYLENE GLYCOL 3350 17 G: 17 POWDER, FOR SOLUTION ORAL at 22:08

## 2020-01-01 RX ADMIN — SODIUM CHLORIDE, SODIUM LACTATE, POTASSIUM CHLORIDE, CALCIUM CHLORIDE AND DEXTROSE MONOHYDRATE: 5; 600; 310; 30; 20 INJECTION, SOLUTION INTRAVENOUS at 00:12

## 2020-01-01 RX ADMIN — ANTI-FUNGAL POWDER MICONAZOLE NITRATE TALC FREE: 1.42 POWDER TOPICAL at 10:08

## 2020-01-01 RX ADMIN — GABAPENTIN 300 MG: 300 CAPSULE ORAL at 20:51

## 2020-01-01 RX ADMIN — INSULIN GLARGINE 22 UNITS: 100 INJECTION, SOLUTION SUBCUTANEOUS at 21:30

## 2020-01-01 RX ADMIN — PROPOFOL 35 MCG/KG/MIN: 10 INJECTION, EMULSION INTRAVENOUS at 21:53

## 2020-01-01 RX ADMIN — THIAMINE HYDROCHLORIDE 200 MG: 100 INJECTION, SOLUTION INTRAMUSCULAR; INTRAVENOUS at 03:30

## 2020-01-01 RX ADMIN — ACETAMINOPHEN 650 MG: 650 SUPPOSITORY RECTAL at 02:13

## 2020-01-01 RX ADMIN — CHLORHEXIDINE GLUCONATE 15 ML: 1.2 RINSE ORAL at 20:13

## 2020-01-01 RX ADMIN — PANTOPRAZOLE SODIUM 40 MG: 40 INJECTION, POWDER, FOR SOLUTION INTRAVENOUS at 20:15

## 2020-01-01 RX ADMIN — DOXYCYCLINE HYCLATE 100 MG: 100 CAPSULE ORAL at 23:55

## 2020-01-01 RX ADMIN — INSULIN GLARGINE 6 UNITS: 100 INJECTION, SOLUTION SUBCUTANEOUS at 22:06

## 2020-01-01 RX ADMIN — SODIUM CHLORIDE 12.5 ML/HR: 9 INJECTION, SOLUTION INTRAVENOUS at 11:06

## 2020-01-01 RX ADMIN — APIXABAN 5 MG: 5 TABLET, FILM COATED ORAL at 09:14

## 2020-01-01 RX ADMIN — HEPARIN 300 UNITS: 100 SYRINGE at 21:29

## 2020-01-01 RX ADMIN — CEFEPIME 1 G: 1 INJECTION, POWDER, FOR SOLUTION INTRAMUSCULAR; INTRAVENOUS at 23:19

## 2020-01-01 RX ADMIN — PROPOFOL 35 MCG/KG/MIN: 10 INJECTION, EMULSION INTRAVENOUS at 23:20

## 2020-01-01 RX ADMIN — IPRATROPIUM BROMIDE AND ALBUTEROL SULFATE 1 AMPULE: .5; 3 SOLUTION RESPIRATORY (INHALATION) at 14:38

## 2020-01-01 RX ADMIN — SODIUM CHLORIDE, SODIUM LACTATE, POTASSIUM CHLORIDE, CALCIUM CHLORIDE AND DEXTROSE MONOHYDRATE: 5; 600; 310; 30; 20 INJECTION, SOLUTION INTRAVENOUS at 13:11

## 2020-01-01 RX ADMIN — ALLOPURINOL 100 MG: 100 TABLET ORAL at 08:01

## 2020-01-01 RX ADMIN — LORAZEPAM 1 MG: 2 INJECTION, SOLUTION INTRAMUSCULAR; INTRAVENOUS at 14:33

## 2020-01-01 RX ADMIN — Medication 50 MG: at 17:20

## 2020-01-01 RX ADMIN — GABAPENTIN 300 MG: 300 CAPSULE ORAL at 14:35

## 2020-01-01 RX ADMIN — CEFEPIME 1 G: 1 INJECTION, POWDER, FOR SOLUTION INTRAMUSCULAR; INTRAVENOUS at 20:35

## 2020-01-01 RX ADMIN — METOCLOPRAMIDE 5 MG: 5 INJECTION, SOLUTION INTRAMUSCULAR; INTRAVENOUS at 10:57

## 2020-01-01 RX ADMIN — MIDODRINE HYDROCHLORIDE 10 MG: 5 TABLET ORAL at 17:35

## 2020-01-01 RX ADMIN — GABAPENTIN 300 MG: 300 CAPSULE ORAL at 10:07

## 2020-01-01 RX ADMIN — SODIUM CHLORIDE, PRESERVATIVE FREE 10 ML: 5 INJECTION INTRAVENOUS at 08:42

## 2020-01-01 RX ADMIN — ANTI-FUNGAL POWDER MICONAZOLE NITRATE TALC FREE: 1.42 POWDER TOPICAL at 21:35

## 2020-01-01 RX ADMIN — CEFEPIME 1 G: 1 INJECTION, POWDER, FOR SOLUTION INTRAMUSCULAR; INTRAVENOUS at 22:45

## 2020-01-01 RX ADMIN — FOLIC ACID 1 MG: 1 TABLET ORAL at 08:20

## 2020-01-01 RX ADMIN — FUROSEMIDE 20 MG: 10 INJECTION, SOLUTION INTRAMUSCULAR; INTRAVENOUS at 12:08

## 2020-01-01 RX ADMIN — CEFEPIME 1 G: 1 INJECTION, POWDER, FOR SOLUTION INTRAMUSCULAR; INTRAVENOUS at 23:55

## 2020-01-01 RX ADMIN — CHLORHEXIDINE GLUCONATE 15 ML: 1.2 RINSE ORAL at 21:52

## 2020-01-01 RX ADMIN — HEPARIN SODIUM AND DEXTROSE 8.5 UNITS/KG/HR: 10000; 5 INJECTION INTRAVENOUS at 11:09

## 2020-01-01 RX ADMIN — FOLIC ACID 1 MG: 1 TABLET ORAL at 08:42

## 2020-01-01 RX ADMIN — LEVOTHYROXINE SODIUM 200 MCG: 200 TABLET ORAL at 05:33

## 2020-01-01 RX ADMIN — SODIUM CHLORIDE: 9 INJECTION, SOLUTION INTRAVENOUS at 01:00

## 2020-01-01 RX ADMIN — HYDROCORTISONE SODIUM SUCCINATE 50 MG: 100 INJECTION, POWDER, FOR SOLUTION INTRAMUSCULAR; INTRAVENOUS at 06:46

## 2020-01-01 RX ADMIN — ASCORBIC ACID 1500 MG: 500 INJECTION INTRAVENOUS at 01:10

## 2020-01-01 RX ADMIN — Medication 1 CAPSULE: at 12:11

## 2020-01-01 RX ADMIN — PANTOPRAZOLE SODIUM 40 MG: 40 INJECTION, POWDER, FOR SOLUTION INTRAVENOUS at 09:47

## 2020-01-01 RX ADMIN — DEXTROSE MONOHYDRATE 25 G: 25 INJECTION, SOLUTION INTRAVENOUS at 08:52

## 2020-01-01 RX ADMIN — INSULIN GLARGINE 18 UNITS: 100 INJECTION, SOLUTION SUBCUTANEOUS at 21:10

## 2020-01-01 RX ADMIN — ASCORBIC ACID 1500 MG: 500 INJECTION INTRAVENOUS at 23:39

## 2020-01-01 RX ADMIN — Medication 1 CAPSULE: at 09:30

## 2020-01-01 RX ADMIN — ALLOPURINOL 100 MG: 100 TABLET ORAL at 21:30

## 2020-01-01 RX ADMIN — ANTI-FUNGAL POWDER MICONAZOLE NITRATE TALC FREE: 1.42 POWDER TOPICAL at 21:26

## 2020-01-01 RX ADMIN — IPRATROPIUM BROMIDE AND ALBUTEROL SULFATE 1 AMPULE: .5; 3 SOLUTION RESPIRATORY (INHALATION) at 18:14

## 2020-01-01 RX ADMIN — ACETAMINOPHEN 650 MG: 325 TABLET, FILM COATED ORAL at 20:52

## 2020-01-01 RX ADMIN — ALLOPURINOL 100 MG: 100 TABLET ORAL at 08:09

## 2020-01-01 RX ADMIN — METOCLOPRAMIDE 5 MG: 5 INJECTION, SOLUTION INTRAMUSCULAR; INTRAVENOUS at 17:18

## 2020-01-01 RX ADMIN — POTASSIUM BICARBONATE 40 MEQ: 782 TABLET, EFFERVESCENT ORAL at 20:53

## 2020-01-01 RX ADMIN — SODIUM CHLORIDE, SODIUM LACTATE, POTASSIUM CHLORIDE, CALCIUM CHLORIDE AND DEXTROSE MONOHYDRATE: 5; 600; 310; 30; 20 INJECTION, SOLUTION INTRAVENOUS at 14:27

## 2020-01-01 RX ADMIN — ALLOPURINOL 100 MG: 100 TABLET ORAL at 09:05

## 2020-01-01 RX ADMIN — IPRATROPIUM BROMIDE AND ALBUTEROL SULFATE 1 AMPULE: .5; 3 SOLUTION RESPIRATORY (INHALATION) at 13:45

## 2020-01-01 RX ADMIN — SODIUM CHLORIDE, PRESERVATIVE FREE 10 ML: 5 INJECTION INTRAVENOUS at 08:08

## 2020-01-01 RX ADMIN — DOPAMINE HYDROCHLORIDE 1 MCG/KG/MIN: 320 INJECTION, SOLUTION INTRAVENOUS at 21:56

## 2020-01-01 RX ADMIN — MIDODRINE HYDROCHLORIDE 10 MG: 5 TABLET ORAL at 07:32

## 2020-01-01 RX ADMIN — PROPOFOL 35 MCG/KG/MIN: 10 INJECTION, EMULSION INTRAVENOUS at 06:47

## 2020-01-01 RX ADMIN — INSULIN LISPRO 2 UNITS: 100 INJECTION, SOLUTION INTRAVENOUS; SUBCUTANEOUS at 01:00

## 2020-01-01 RX ADMIN — POTASSIUM BICARBONATE 20 MEQ: 782 TABLET, EFFERVESCENT ORAL at 08:04

## 2020-01-01 RX ADMIN — CHLORHEXIDINE GLUCONATE 15 ML: 1.2 RINSE ORAL at 09:47

## 2020-01-01 RX ADMIN — LEVOTHYROXINE SODIUM 250 MCG: 50 TABLET ORAL at 05:37

## 2020-01-01 RX ADMIN — PANTOPRAZOLE SODIUM 40 MG: 40 TABLET, DELAYED RELEASE ORAL at 06:29

## 2020-01-01 RX ADMIN — GABAPENTIN 300 MG: 300 CAPSULE ORAL at 14:01

## 2020-01-01 RX ADMIN — GABAPENTIN 300 MG: 300 CAPSULE ORAL at 21:33

## 2020-01-01 RX ADMIN — GABAPENTIN 300 MG: 300 CAPSULE ORAL at 21:26

## 2020-01-01 RX ADMIN — DOXYCYCLINE 100 MG: 100 INJECTION, POWDER, LYOPHILIZED, FOR SOLUTION INTRAVENOUS at 08:02

## 2020-01-01 RX ADMIN — SODIUM CHLORIDE, SODIUM LACTATE, POTASSIUM CHLORIDE, CALCIUM CHLORIDE AND DEXTROSE MONOHYDRATE: 5; 600; 310; 30; 20 INJECTION, SOLUTION INTRAVENOUS at 07:45

## 2020-01-01 RX ADMIN — APIXABAN 5 MG: 5 TABLET, FILM COATED ORAL at 21:28

## 2020-01-01 RX ADMIN — SERTRALINE HYDROCHLORIDE 25 MG: 25 TABLET ORAL at 20:45

## 2020-01-01 RX ADMIN — SODIUM CHLORIDE: 9 INJECTION, SOLUTION INTRAVENOUS at 17:19

## 2020-01-01 RX ADMIN — IPRATROPIUM BROMIDE AND ALBUTEROL SULFATE 1 AMPULE: .5; 3 SOLUTION RESPIRATORY (INHALATION) at 15:30

## 2020-01-01 RX ADMIN — SODIUM CHLORIDE 8 MG/HR: 9 INJECTION, SOLUTION INTRAVENOUS at 18:51

## 2020-01-01 RX ADMIN — VASOPRESSIN 0.03 UNITS/MIN: 20 INJECTION INTRAVENOUS at 20:20

## 2020-01-01 RX ADMIN — ENOXAPARIN SODIUM 120 MG: 120 INJECTION SUBCUTANEOUS at 07:34

## 2020-01-01 RX ADMIN — APIXABAN 5 MG: 5 TABLET, FILM COATED ORAL at 22:17

## 2020-01-01 RX ADMIN — CEFEPIME 1 G: 1 INJECTION, POWDER, FOR SOLUTION INTRAMUSCULAR; INTRAVENOUS at 05:36

## 2020-01-01 RX ADMIN — SODIUM CHLORIDE, PRESERVATIVE FREE 10 ML: 5 INJECTION INTRAVENOUS at 21:02

## 2020-01-01 RX ADMIN — SODIUM CHLORIDE, PRESERVATIVE FREE 10 ML: 5 INJECTION INTRAVENOUS at 21:13

## 2020-01-01 RX ADMIN — ACETAMINOPHEN 650 MG: 325 TABLET, FILM COATED ORAL at 18:15

## 2020-01-01 RX ADMIN — METOCLOPRAMIDE 10 MG: 5 INJECTION, SOLUTION INTRAMUSCULAR; INTRAVENOUS at 22:43

## 2020-01-01 RX ADMIN — HYDROCORTISONE SODIUM SUCCINATE 50 MG: 100 INJECTION, POWDER, FOR SOLUTION INTRAMUSCULAR; INTRAVENOUS at 01:00

## 2020-01-01 RX ADMIN — ASCORBIC ACID 1500 MG: 500 INJECTION INTRAVENOUS at 16:51

## 2020-01-01 RX ADMIN — SODIUM CHLORIDE 12.5 ML/HR: 9 INJECTION, SOLUTION INTRAVENOUS at 11:17

## 2020-01-01 RX ADMIN — SODIUM CHLORIDE 12.5 ML/HR: 9 INJECTION, SOLUTION INTRAVENOUS at 11:13

## 2020-01-01 RX ADMIN — CEFEPIME 1 G: 1 INJECTION, POWDER, FOR SOLUTION INTRAMUSCULAR; INTRAVENOUS at 23:56

## 2020-01-01 RX ADMIN — GABAPENTIN 300 MG: 300 CAPSULE ORAL at 15:18

## 2020-01-01 RX ADMIN — SODIUM CHLORIDE 12.5 ML/HR: 9 INJECTION, SOLUTION INTRAVENOUS at 09:42

## 2020-01-01 RX ADMIN — FUROSEMIDE 20 MG: 10 INJECTION, SOLUTION INTRAMUSCULAR; INTRAVENOUS at 18:00

## 2020-01-01 RX ADMIN — FUROSEMIDE 40 MG: 10 INJECTION, SOLUTION INTRAMUSCULAR; INTRAVENOUS at 08:29

## 2020-01-01 RX ADMIN — FLUCONAZOLE 200 MG: 200 INJECTION, SOLUTION INTRAVENOUS at 12:06

## 2020-01-01 RX ADMIN — APIXABAN 5 MG: 5 TABLET, FILM COATED ORAL at 21:29

## 2020-01-01 RX ADMIN — INSULIN GLARGINE 14 UNITS: 100 INJECTION, SOLUTION SUBCUTANEOUS at 21:39

## 2020-01-01 RX ADMIN — CEFEPIME 1 G: 1 INJECTION, POWDER, FOR SOLUTION INTRAMUSCULAR; INTRAVENOUS at 14:01

## 2020-01-01 RX ADMIN — INSULIN GLARGINE 18 UNITS: 100 INJECTION, SOLUTION SUBCUTANEOUS at 21:29

## 2020-01-01 RX ADMIN — ALLOPURINOL 100 MG: 100 TABLET ORAL at 20:45

## 2020-01-01 RX ADMIN — ANTI-FUNGAL POWDER MICONAZOLE NITRATE TALC FREE: 1.42 POWDER TOPICAL at 21:51

## 2020-01-01 RX ADMIN — INSULIN GLARGINE 18 UNITS: 100 INJECTION, SOLUTION SUBCUTANEOUS at 21:35

## 2020-01-01 RX ADMIN — ALLOPURINOL 100 MG: 100 TABLET ORAL at 21:33

## 2020-01-01 RX ADMIN — SODIUM CHLORIDE: 9 INJECTION, SOLUTION INTRAVENOUS at 09:08

## 2020-01-01 RX ADMIN — METOCLOPRAMIDE 5 MG: 5 INJECTION, SOLUTION INTRAMUSCULAR; INTRAVENOUS at 05:04

## 2020-01-01 RX ADMIN — INSULIN GLARGINE 18 UNITS: 100 INJECTION, SOLUTION SUBCUTANEOUS at 20:54

## 2020-01-01 RX ADMIN — GABAPENTIN 300 MG: 300 CAPSULE ORAL at 08:46

## 2020-01-01 RX ADMIN — FLUCONAZOLE 200 MG: 200 INJECTION, SOLUTION INTRAVENOUS at 12:04

## 2020-01-01 RX ADMIN — GABAPENTIN 300 MG: 300 CAPSULE ORAL at 11:21

## 2020-01-01 RX ADMIN — PROPOFOL INJECTABLE EMULSION 10 MCG/KG/MIN: 10 INJECTION, EMULSION INTRAVENOUS at 05:07

## 2020-01-01 RX ADMIN — SODIUM CHLORIDE: 9 INJECTION, SOLUTION INTRAVENOUS at 09:36

## 2020-01-01 RX ADMIN — GABAPENTIN 100 MG: 100 CAPSULE ORAL at 22:10

## 2020-01-01 RX ADMIN — PROPOFOL 35 MCG/KG/MIN: 10 INJECTION, EMULSION INTRAVENOUS at 17:51

## 2020-01-01 RX ADMIN — INSULIN LISPRO 4 UNITS: 100 INJECTION, SOLUTION INTRAVENOUS; SUBCUTANEOUS at 05:39

## 2020-01-01 RX ADMIN — HYDROXYCHLOROQUINE SULFATE 200 MG: 200 TABLET, FILM COATED ORAL at 08:30

## 2020-01-01 RX ADMIN — ENOXAPARIN SODIUM 120 MG: 120 INJECTION SUBCUTANEOUS at 08:45

## 2020-01-01 RX ADMIN — METOCLOPRAMIDE 5 MG: 5 INJECTION, SOLUTION INTRAMUSCULAR; INTRAVENOUS at 17:41

## 2020-01-01 RX ADMIN — GABAPENTIN 300 MG: 300 CAPSULE ORAL at 20:56

## 2020-01-01 RX ADMIN — SODIUM CHLORIDE, PRESERVATIVE FREE 10 ML: 5 INJECTION INTRAVENOUS at 09:47

## 2020-01-01 RX ADMIN — DOXYCYCLINE HYCLATE 100 MG: 100 CAPSULE ORAL at 20:56

## 2020-01-01 RX ADMIN — OYSTER SHELL CALCIUM WITH VITAMIN D 1 TABLET: 500; 200 TABLET, FILM COATED ORAL at 08:26

## 2020-01-01 RX ADMIN — MORPHINE SULFATE 1 MG: 2 INJECTION, SOLUTION INTRAMUSCULAR; INTRAVENOUS at 03:07

## 2020-01-01 RX ADMIN — NOREPINEPHRINE BITARTRATE 12.5 MCG/MIN: 1 INJECTION, SOLUTION, CONCENTRATE INTRAVENOUS at 06:03

## 2020-01-01 RX ADMIN — INSULIN LISPRO 2 UNITS: 100 INJECTION, SOLUTION INTRAVENOUS; SUBCUTANEOUS at 00:15

## 2020-01-01 RX ADMIN — DOXYCYCLINE HYCLATE 100 MG: 100 CAPSULE ORAL at 09:05

## 2020-01-01 RX ADMIN — OYSTER SHELL CALCIUM WITH VITAMIN D 1 TABLET: 500; 200 TABLET, FILM COATED ORAL at 09:27

## 2020-01-01 RX ADMIN — GABAPENTIN 300 MG: 300 CAPSULE ORAL at 08:30

## 2020-01-01 RX ADMIN — SODIUM CHLORIDE, SODIUM LACTATE, POTASSIUM CHLORIDE, CALCIUM CHLORIDE AND DEXTROSE MONOHYDRATE: 5; 600; 310; 30; 20 INJECTION, SOLUTION INTRAVENOUS at 04:31

## 2020-01-01 RX ADMIN — ATORVASTATIN CALCIUM 10 MG: 10 TABLET, FILM COATED ORAL at 20:51

## 2020-01-01 RX ADMIN — PANTOPRAZOLE SODIUM 40 MG: 40 INJECTION, POWDER, FOR SOLUTION INTRAVENOUS at 20:32

## 2020-01-01 RX ADMIN — INSULIN LISPRO 2 UNITS: 100 INJECTION, SOLUTION INTRAVENOUS; SUBCUTANEOUS at 12:30

## 2020-01-01 RX ADMIN — SODIUM CHLORIDE, PRESERVATIVE FREE 10 ML: 5 INJECTION INTRAVENOUS at 08:49

## 2020-01-01 RX ADMIN — ATORVASTATIN CALCIUM 10 MG: 10 TABLET, FILM COATED ORAL at 21:33

## 2020-01-01 RX ADMIN — INSULIN LISPRO 4 UNITS: 100 INJECTION, SOLUTION INTRAVENOUS; SUBCUTANEOUS at 06:50

## 2020-01-01 RX ADMIN — OYSTER SHELL CALCIUM WITH VITAMIN D 1 TABLET: 500; 200 TABLET, FILM COATED ORAL at 08:46

## 2020-01-01 RX ADMIN — SODIUM CHLORIDE: 9 INJECTION, SOLUTION INTRAVENOUS at 22:23

## 2020-01-01 RX ADMIN — IPRATROPIUM BROMIDE AND ALBUTEROL SULFATE 1 AMPULE: .5; 3 SOLUTION RESPIRATORY (INHALATION) at 05:40

## 2020-01-01 RX ADMIN — LEVOTHYROXINE SODIUM 250 MCG: 50 TABLET ORAL at 06:04

## 2020-01-01 RX ADMIN — LEVOTHYROXINE SODIUM 250 MCG: 50 TABLET ORAL at 06:46

## 2020-01-01 RX ADMIN — MIDODRINE HYDROCHLORIDE 10 MG: 5 TABLET ORAL at 11:35

## 2020-01-01 RX ADMIN — BUDESONIDE 500 MCG: 0.5 INHALANT RESPIRATORY (INHALATION) at 05:58

## 2020-01-01 RX ADMIN — SODIUM CHLORIDE, PRESERVATIVE FREE 10 ML: 5 INJECTION INTRAVENOUS at 09:05

## 2020-01-01 RX ADMIN — GABAPENTIN 300 MG: 300 CAPSULE ORAL at 09:05

## 2020-01-01 RX ADMIN — THIAMINE HYDROCHLORIDE 200 MG: 100 INJECTION, SOLUTION INTRAMUSCULAR; INTRAVENOUS at 17:32

## 2020-01-01 RX ADMIN — Medication 50 MG: at 08:50

## 2020-01-01 RX ADMIN — ANTI-FUNGAL POWDER MICONAZOLE NITRATE TALC FREE: 1.42 POWDER TOPICAL at 08:46

## 2020-01-01 RX ADMIN — OYSTER SHELL CALCIUM WITH VITAMIN D 1 TABLET: 500; 200 TABLET, FILM COATED ORAL at 11:29

## 2020-01-01 RX ADMIN — FUROSEMIDE 20 MG: 10 INJECTION, SOLUTION INTRAMUSCULAR; INTRAVENOUS at 08:21

## 2020-01-01 RX ADMIN — INSULIN LISPRO 4 UNITS: 100 INJECTION, SOLUTION INTRAVENOUS; SUBCUTANEOUS at 12:10

## 2020-01-01 RX ADMIN — INSULIN LISPRO 1 UNITS: 100 INJECTION, SOLUTION INTRAVENOUS; SUBCUTANEOUS at 18:42

## 2020-01-01 RX ADMIN — ALLOPURINOL 100 MG: 100 TABLET ORAL at 08:46

## 2020-01-01 RX ADMIN — INSULIN LISPRO 1 UNITS: 100 INJECTION, SOLUTION INTRAVENOUS; SUBCUTANEOUS at 22:03

## 2020-01-01 RX ADMIN — ASCORBIC ACID 1500 MG: 500 INJECTION INTRAVENOUS at 20:12

## 2020-01-01 RX ADMIN — SODIUM CHLORIDE 12.5 ML/HR: 9 INJECTION, SOLUTION INTRAVENOUS at 19:15

## 2020-01-01 RX ADMIN — Medication 1 CAPSULE: at 07:35

## 2020-01-01 RX ADMIN — SERTRALINE HYDROCHLORIDE 25 MG: 25 TABLET ORAL at 20:39

## 2020-01-01 RX ADMIN — FOLIC ACID 1 MG: 1 TABLET ORAL at 11:46

## 2020-01-01 RX ADMIN — SODIUM CHLORIDE, PRESERVATIVE FREE 10 ML: 5 INJECTION INTRAVENOUS at 20:34

## 2020-01-01 RX ADMIN — INSULIN HUMAN 10 UNITS: 100 INJECTION, SOLUTION PARENTERAL at 14:41

## 2020-01-01 RX ADMIN — SODIUM CHLORIDE, PRESERVATIVE FREE 10 ML: 5 INJECTION INTRAVENOUS at 11:48

## 2020-01-01 RX ADMIN — INSULIN LISPRO 3 UNITS: 100 INJECTION, SOLUTION INTRAVENOUS; SUBCUTANEOUS at 21:29

## 2020-01-01 RX ADMIN — ANTI-FUNGAL POWDER MICONAZOLE NITRATE TALC FREE: 1.42 POWDER TOPICAL at 09:00

## 2020-01-01 RX ADMIN — SODIUM CHLORIDE, PRESERVATIVE FREE 10 ML: 5 INJECTION INTRAVENOUS at 21:50

## 2020-01-01 RX ADMIN — METOCLOPRAMIDE 10 MG: 5 INJECTION, SOLUTION INTRAMUSCULAR; INTRAVENOUS at 06:04

## 2020-01-01 RX ADMIN — CHLORHEXIDINE GLUCONATE 15 ML: 1.2 RINSE ORAL at 21:33

## 2020-01-01 RX ADMIN — DOXYCYCLINE HYCLATE 100 MG: 100 CAPSULE ORAL at 20:32

## 2020-01-01 RX ADMIN — INSULIN GLARGINE 6 UNITS: 100 INJECTION, SOLUTION SUBCUTANEOUS at 00:02

## 2020-01-01 RX ADMIN — VANCOMYCIN HYDROCHLORIDE 1500 MG: 10 INJECTION, POWDER, LYOPHILIZED, FOR SOLUTION INTRAVENOUS at 17:21

## 2020-01-01 RX ADMIN — METOCLOPRAMIDE 5 MG: 5 INJECTION, SOLUTION INTRAMUSCULAR; INTRAVENOUS at 23:31

## 2020-01-01 RX ADMIN — Medication 1 CAPSULE: at 10:09

## 2020-01-01 RX ADMIN — INSULIN LISPRO 1 UNITS: 100 INJECTION, SOLUTION INTRAVENOUS; SUBCUTANEOUS at 21:06

## 2020-01-01 RX ADMIN — FUROSEMIDE 20 MG: 10 INJECTION, SOLUTION INTRAMUSCULAR; INTRAVENOUS at 18:36

## 2020-01-01 RX ADMIN — IPRATROPIUM BROMIDE AND ALBUTEROL SULFATE 1 AMPULE: .5; 3 SOLUTION RESPIRATORY (INHALATION) at 02:33

## 2020-01-01 RX ADMIN — HYDROXYCHLOROQUINE SULFATE 200 MG: 200 TABLET, FILM COATED ORAL at 08:09

## 2020-01-01 RX ADMIN — ENOXAPARIN SODIUM 120 MG: 120 INJECTION SUBCUTANEOUS at 10:08

## 2020-01-01 RX ADMIN — METOCLOPRAMIDE 5 MG: 5 INJECTION, SOLUTION INTRAMUSCULAR; INTRAVENOUS at 05:37

## 2020-01-01 RX ADMIN — PROPOFOL 35 MCG/KG/MIN: 10 INJECTION, EMULSION INTRAVENOUS at 15:44

## 2020-01-01 RX ADMIN — ATORVASTATIN CALCIUM 10 MG: 10 TABLET, FILM COATED ORAL at 22:09

## 2020-01-01 RX ADMIN — APIXABAN 5 MG: 5 TABLET, FILM COATED ORAL at 08:09

## 2020-01-01 RX ADMIN — INSULIN LISPRO 2 UNITS: 100 INJECTION, SOLUTION INTRAVENOUS; SUBCUTANEOUS at 18:02

## 2020-01-01 RX ADMIN — ALLOPURINOL 100 MG: 100 TABLET ORAL at 08:20

## 2020-01-01 RX ADMIN — CEFEPIME 1 G: 1 INJECTION, POWDER, FOR SOLUTION INTRAMUSCULAR; INTRAVENOUS at 22:56

## 2020-01-01 RX ADMIN — Medication 1 CAPSULE: at 08:46

## 2020-01-01 RX ADMIN — ACETAMINOPHEN 650 MG: 325 TABLET, FILM COATED ORAL at 08:01

## 2020-01-01 RX ADMIN — OYSTER SHELL CALCIUM WITH VITAMIN D 1 TABLET: 500; 200 TABLET, FILM COATED ORAL at 07:39

## 2020-01-01 RX ADMIN — HYDROXYCHLOROQUINE SULFATE 200 MG: 200 TABLET, FILM COATED ORAL at 12:11

## 2020-01-01 RX ADMIN — INSULIN HUMAN 10 UNITS: 100 INJECTION, SOLUTION PARENTERAL at 21:33

## 2020-01-01 RX ADMIN — VANCOMYCIN HYDROCHLORIDE 1500 MG: 10 INJECTION, POWDER, LYOPHILIZED, FOR SOLUTION INTRAVENOUS at 06:38

## 2020-01-01 RX ADMIN — INSULIN GLARGINE 18 UNITS: 100 INJECTION, SOLUTION SUBCUTANEOUS at 20:42

## 2020-01-01 RX ADMIN — CEFEPIME 1 G: 1 INJECTION, POWDER, FOR SOLUTION INTRAMUSCULAR; INTRAVENOUS at 08:15

## 2020-01-01 RX ADMIN — SODIUM CHLORIDE, PRESERVATIVE FREE 10 ML: 5 INJECTION INTRAVENOUS at 12:28

## 2020-01-01 RX ADMIN — ETOMIDATE 20 MG: 20 INJECTION, SOLUTION INTRAVENOUS at 04:50

## 2020-01-01 RX ADMIN — INSULIN LISPRO 1 UNITS: 100 INJECTION, SOLUTION INTRAVENOUS; SUBCUTANEOUS at 09:20

## 2020-01-01 RX ADMIN — DOXYCYCLINE HYCLATE 100 MG: 100 CAPSULE ORAL at 08:47

## 2020-01-01 RX ADMIN — OYSTER SHELL CALCIUM WITH VITAMIN D 1 TABLET: 500; 200 TABLET, FILM COATED ORAL at 08:42

## 2020-01-01 RX ADMIN — IPRATROPIUM BROMIDE AND ALBUTEROL SULFATE 1 AMPULE: .5; 3 SOLUTION RESPIRATORY (INHALATION) at 15:42

## 2020-01-01 RX ADMIN — ACETAMINOPHEN 650 MG: 325 TABLET, FILM COATED ORAL at 21:27

## 2020-01-01 RX ADMIN — CHLORHEXIDINE GLUCONATE 15 ML: 1.2 RINSE ORAL at 08:43

## 2020-01-01 RX ADMIN — PANTOPRAZOLE SODIUM 40 MG: 40 INJECTION, POWDER, FOR SOLUTION INTRAVENOUS at 22:07

## 2020-01-01 RX ADMIN — POLYETHYLENE GLYCOL 3350 17 G: 17 POWDER, FOR SOLUTION ORAL at 09:30

## 2020-01-01 RX ADMIN — Medication 75 MCG/HR: at 17:55

## 2020-01-01 RX ADMIN — MAGNESIUM SULFATE HEPTAHYDRATE 2 G: 40 INJECTION, SOLUTION INTRAVENOUS at 21:22

## 2020-01-01 RX ADMIN — INSULIN GLARGINE 18 UNITS: 100 INJECTION, SOLUTION SUBCUTANEOUS at 21:40

## 2020-01-01 RX ADMIN — DOXYCYCLINE HYCLATE 100 MG: 100 CAPSULE ORAL at 21:29

## 2020-01-01 RX ADMIN — GABAPENTIN 300 MG: 300 CAPSULE ORAL at 09:00

## 2020-01-01 RX ADMIN — GABAPENTIN 300 MG: 300 CAPSULE ORAL at 20:42

## 2020-01-01 RX ADMIN — INSULIN LISPRO 4 UNITS: 100 INJECTION, SOLUTION INTRAVENOUS; SUBCUTANEOUS at 23:35

## 2020-01-01 RX ADMIN — ANTI-FUNGAL POWDER MICONAZOLE NITRATE TALC FREE: 1.42 POWDER TOPICAL at 12:27

## 2020-01-01 RX ADMIN — Medication 75 MCG/HR: at 13:03

## 2020-01-01 RX ADMIN — PANTOPRAZOLE SODIUM 40 MG: 40 TABLET, DELAYED RELEASE ORAL at 06:58

## 2020-01-01 RX ADMIN — DOXYCYCLINE 100 MG: 100 INJECTION, POWDER, LYOPHILIZED, FOR SOLUTION INTRAVENOUS at 21:25

## 2020-01-01 RX ADMIN — GABAPENTIN 300 MG: 300 CAPSULE ORAL at 08:47

## 2020-01-01 RX ADMIN — VASOPRESSIN 0.03 UNITS/MIN: 20 INJECTION INTRAVENOUS at 09:09

## 2020-01-01 RX ADMIN — CHLORHEXIDINE GLUCONATE 15 ML: 1.2 RINSE ORAL at 20:31

## 2020-01-01 RX ADMIN — SODIUM CHLORIDE 8 MG/HR: 9 INJECTION, SOLUTION INTRAVENOUS at 14:52

## 2020-01-01 RX ADMIN — PROPOFOL 35 MCG/KG/MIN: 10 INJECTION, EMULSION INTRAVENOUS at 13:04

## 2020-01-01 RX ADMIN — METOCLOPRAMIDE 10 MG: 5 INJECTION, SOLUTION INTRAMUSCULAR; INTRAVENOUS at 16:42

## 2020-01-01 RX ADMIN — SODIUM CHLORIDE, PRESERVATIVE FREE 10 ML: 5 INJECTION INTRAVENOUS at 08:23

## 2020-01-01 RX ADMIN — Medication 1 CAPSULE: at 09:05

## 2020-01-01 RX ADMIN — CEFEPIME 1 G: 1 INJECTION, POWDER, FOR SOLUTION INTRAMUSCULAR; INTRAVENOUS at 06:47

## 2020-01-01 RX ADMIN — Medication 50 MG: at 09:08

## 2020-01-01 RX ADMIN — HYDROCORTISONE SODIUM SUCCINATE 50 MG: 100 INJECTION, POWDER, FOR SOLUTION INTRAMUSCULAR; INTRAVENOUS at 13:56

## 2020-01-01 RX ADMIN — SERTRALINE HYDROCHLORIDE 25 MG: 25 TABLET ORAL at 20:43

## 2020-01-01 RX ADMIN — OYSTER SHELL CALCIUM WITH VITAMIN D 1 TABLET: 500; 200 TABLET, FILM COATED ORAL at 09:08

## 2020-01-01 RX ADMIN — HEPARIN 300 UNITS: 100 SYRINGE at 21:50

## 2020-01-01 RX ADMIN — THIAMINE HYDROCHLORIDE 200 MG: 100 INJECTION, SOLUTION INTRAMUSCULAR; INTRAVENOUS at 14:55

## 2020-01-01 RX ADMIN — CEFEPIME 1 G: 1 INJECTION, POWDER, FOR SOLUTION INTRAMUSCULAR; INTRAVENOUS at 14:54

## 2020-01-01 RX ADMIN — SODIUM CHLORIDE, PRESERVATIVE FREE 10 ML: 5 INJECTION INTRAVENOUS at 21:25

## 2020-01-01 RX ADMIN — SERTRALINE HYDROCHLORIDE 25 MG: 25 TABLET ORAL at 21:29

## 2020-01-01 RX ADMIN — CEFEPIME 1 G: 1 INJECTION, POWDER, FOR SOLUTION INTRAMUSCULAR; INTRAVENOUS at 06:51

## 2020-01-01 RX ADMIN — IPRATROPIUM BROMIDE AND ALBUTEROL SULFATE 1 AMPULE: .5; 3 SOLUTION RESPIRATORY (INHALATION) at 05:13

## 2020-01-01 RX ADMIN — ATORVASTATIN CALCIUM 10 MG: 10 TABLET, FILM COATED ORAL at 20:56

## 2020-01-01 RX ADMIN — ALLOPURINOL 100 MG: 100 TABLET ORAL at 10:05

## 2020-01-01 RX ADMIN — CHLORHEXIDINE GLUCONATE 15 ML: 1.2 RINSE ORAL at 12:56

## 2020-01-01 RX ADMIN — BUDESONIDE 500 MCG: 0.5 INHALANT RESPIRATORY (INHALATION) at 08:36

## 2020-01-01 RX ADMIN — HYDROXYCHLOROQUINE SULFATE 200 MG: 200 TABLET, FILM COATED ORAL at 20:45

## 2020-01-01 RX ADMIN — THIAMINE HYDROCHLORIDE 200 MG: 100 INJECTION, SOLUTION INTRAMUSCULAR; INTRAVENOUS at 17:21

## 2020-01-01 RX ADMIN — GABAPENTIN 300 MG: 300 CAPSULE ORAL at 14:26

## 2020-01-01 RX ADMIN — PANTOPRAZOLE SODIUM 40 MG: 40 INJECTION, POWDER, FOR SOLUTION INTRAVENOUS at 09:36

## 2020-01-01 RX ADMIN — INSULIN LISPRO 2 UNITS: 100 INJECTION, SOLUTION INTRAVENOUS; SUBCUTANEOUS at 21:35

## 2020-01-01 RX ADMIN — ANTI-FUNGAL POWDER MICONAZOLE NITRATE TALC FREE: 1.42 POWDER TOPICAL at 20:17

## 2020-01-01 RX ADMIN — ATROPINE SULFATE 0.5 MG: 0.1 INJECTION PARENTERAL at 15:07

## 2020-01-01 RX ADMIN — DOXYCYCLINE HYCLATE 100 MG: 100 CAPSULE ORAL at 20:14

## 2020-01-01 RX ADMIN — POTASSIUM BICARBONATE 20 MEQ: 782 TABLET, EFFERVESCENT ORAL at 12:08

## 2020-01-01 RX ADMIN — PROPOFOL 35 MCG/KG/MIN: 10 INJECTION, EMULSION INTRAVENOUS at 03:53

## 2020-01-01 RX ADMIN — SODIUM CHLORIDE 12.5 ML/HR: 9 INJECTION, SOLUTION INTRAVENOUS at 03:35

## 2020-01-01 RX ADMIN — PROPOFOL INJECTABLE EMULSION 20 MCG/KG/MIN: 10 INJECTION, EMULSION INTRAVENOUS at 11:24

## 2020-01-01 RX ADMIN — ALLOPURINOL 100 MG: 100 TABLET ORAL at 22:09

## 2020-01-01 RX ADMIN — CEFEPIME 1 G: 1 INJECTION, POWDER, FOR SOLUTION INTRAMUSCULAR; INTRAVENOUS at 06:33

## 2020-01-01 RX ADMIN — METOCLOPRAMIDE 5 MG: 5 INJECTION, SOLUTION INTRAMUSCULAR; INTRAVENOUS at 09:45

## 2020-01-01 RX ADMIN — IPRATROPIUM BROMIDE AND ALBUTEROL SULFATE 1 AMPULE: .5; 3 SOLUTION RESPIRATORY (INHALATION) at 16:56

## 2020-01-01 RX ADMIN — Medication 75 MCG/HR: at 06:00

## 2020-01-01 RX ADMIN — INSULIN LISPRO 2 UNITS: 100 INJECTION, SOLUTION INTRAVENOUS; SUBCUTANEOUS at 12:19

## 2020-01-01 RX ADMIN — INSULIN LISPRO 2 UNITS: 100 INJECTION, SOLUTION INTRAVENOUS; SUBCUTANEOUS at 18:30

## 2020-01-01 RX ADMIN — HEPARIN SODIUM AND DEXTROSE 8.5 UNITS/KG/HR: 10000; 5 INJECTION INTRAVENOUS at 09:10

## 2020-01-01 RX ADMIN — ANTI-FUNGAL POWDER MICONAZOLE NITRATE TALC FREE: 1.42 POWDER TOPICAL at 21:24

## 2020-01-01 RX ADMIN — PROPOFOL INJECTABLE EMULSION 30 MCG/KG/MIN: 10 INJECTION, EMULSION INTRAVENOUS at 17:54

## 2020-01-01 RX ADMIN — INSULIN LISPRO 1 UNITS: 100 INJECTION, SOLUTION INTRAVENOUS; SUBCUTANEOUS at 21:10

## 2020-01-01 RX ADMIN — DOXYCYCLINE HYCLATE 100 MG: 100 CAPSULE ORAL at 09:30

## 2020-01-01 RX ADMIN — CEFEPIME 1 G: 1 INJECTION, POWDER, FOR SOLUTION INTRAMUSCULAR; INTRAVENOUS at 14:46

## 2020-01-01 RX ADMIN — ANTI-FUNGAL POWDER MICONAZOLE NITRATE TALC FREE: 1.42 POWDER TOPICAL at 20:46

## 2020-01-01 RX ADMIN — ASCORBIC ACID 1500 MG: 500 INJECTION INTRAVENOUS at 11:46

## 2020-01-01 RX ADMIN — GABAPENTIN 300 MG: 300 CAPSULE ORAL at 14:15

## 2020-01-01 RX ADMIN — ASCORBIC ACID 1500 MG: 500 INJECTION INTRAVENOUS at 05:33

## 2020-01-01 RX ADMIN — ALLOPURINOL 100 MG: 100 TABLET ORAL at 21:29

## 2020-01-01 RX ADMIN — INSULIN LISPRO 1 UNITS: 100 INJECTION, SOLUTION INTRAVENOUS; SUBCUTANEOUS at 11:53

## 2020-01-01 RX ADMIN — APIXABAN 5 MG: 5 TABLET, FILM COATED ORAL at 08:01

## 2020-01-01 RX ADMIN — GABAPENTIN 100 MG: 100 CAPSULE ORAL at 20:31

## 2020-01-01 RX ADMIN — DOXYCYCLINE 100 MG: 100 INJECTION, POWDER, LYOPHILIZED, FOR SOLUTION INTRAVENOUS at 08:29

## 2020-01-01 RX ADMIN — IPRATROPIUM BROMIDE AND ALBUTEROL SULFATE 1 AMPULE: .5; 3 SOLUTION RESPIRATORY (INHALATION) at 09:19

## 2020-01-01 RX ADMIN — METOCLOPRAMIDE 10 MG: 5 INJECTION, SOLUTION INTRAMUSCULAR; INTRAVENOUS at 04:37

## 2020-01-01 RX ADMIN — FUROSEMIDE 20 MG: 10 INJECTION, SOLUTION INTRAMUSCULAR; INTRAVENOUS at 08:09

## 2020-01-01 RX ADMIN — PANTOPRAZOLE SODIUM 40 MG: 40 INJECTION, POWDER, FOR SOLUTION INTRAVENOUS at 08:49

## 2020-01-01 RX ADMIN — HYDROXYCHLOROQUINE SULFATE 200 MG: 200 TABLET, FILM COATED ORAL at 08:01

## 2020-01-01 RX ADMIN — POLYETHYLENE GLYCOL 3350 17 G: 17 POWDER, FOR SOLUTION ORAL at 08:42

## 2020-01-01 RX ADMIN — FUROSEMIDE 20 MG: 10 INJECTION, SOLUTION INTRAMUSCULAR; INTRAVENOUS at 16:51

## 2020-01-01 RX ADMIN — IPRATROPIUM BROMIDE AND ALBUTEROL SULFATE 1 AMPULE: .5; 3 SOLUTION RESPIRATORY (INHALATION) at 09:28

## 2020-01-01 RX ADMIN — ASCORBIC ACID 1500 MG: 500 INJECTION INTRAVENOUS at 23:19

## 2020-01-01 RX ADMIN — METOCLOPRAMIDE 10 MG: 5 INJECTION, SOLUTION INTRAMUSCULAR; INTRAVENOUS at 17:35

## 2020-01-01 RX ADMIN — SERTRALINE HYDROCHLORIDE 25 MG: 25 TABLET ORAL at 22:09

## 2020-01-01 RX ADMIN — LEVOTHYROXINE SODIUM 200 MCG: 200 TABLET ORAL at 06:38

## 2020-01-01 RX ADMIN — DOXYCYCLINE 100 MG: 100 INJECTION, POWDER, LYOPHILIZED, FOR SOLUTION INTRAVENOUS at 20:54

## 2020-01-01 RX ADMIN — GABAPENTIN 100 MG: 100 CAPSULE ORAL at 20:14

## 2020-01-01 RX ADMIN — SODIUM CHLORIDE, PRESERVATIVE FREE 10 ML: 5 INJECTION INTRAVENOUS at 08:47

## 2020-01-01 RX ADMIN — FUROSEMIDE 40 MG: 10 INJECTION INTRAMUSCULAR; INTRAVENOUS at 05:44

## 2020-01-01 RX ADMIN — INSULIN LISPRO 4 UNITS: 100 INJECTION, SOLUTION INTRAVENOUS; SUBCUTANEOUS at 12:00

## 2020-01-01 RX ADMIN — ETOMIDATE 20 MG: 2 INJECTION INTRAVENOUS at 04:50

## 2020-01-01 RX ADMIN — DOPAMINE HYDROCHLORIDE 2.5 MCG/KG/MIN: 320 INJECTION, SOLUTION INTRAVENOUS at 21:44

## 2020-01-01 RX ADMIN — DOXYCYCLINE HYCLATE 100 MG: 100 CAPSULE ORAL at 10:07

## 2020-01-01 RX ADMIN — PANTOPRAZOLE SODIUM 40 MG: 40 INJECTION, POWDER, FOR SOLUTION INTRAVENOUS at 08:42

## 2020-01-01 RX ADMIN — INSULIN LISPRO 1 UNITS: 100 INJECTION, SOLUTION INTRAVENOUS; SUBCUTANEOUS at 18:25

## 2020-01-01 RX ADMIN — SODIUM BICARBONATE 50 MEQ: 84 INJECTION, SOLUTION INTRAVENOUS at 13:56

## 2020-01-01 RX ADMIN — POTASSIUM BICARBONATE 20 MEQ: 782 TABLET, EFFERVESCENT ORAL at 08:49

## 2020-01-01 RX ADMIN — TOCILIZUMAB 400 MG: 20 INJECTION, SOLUTION, CONCENTRATE INTRAVENOUS at 18:44

## 2020-01-01 RX ADMIN — HEPARIN 300 UNITS: 100 SYRINGE at 21:34

## 2020-01-01 RX ADMIN — SODIUM CHLORIDE 12.5 ML/HR: 9 INJECTION, SOLUTION INTRAVENOUS at 11:15

## 2020-01-01 RX ADMIN — FOLIC ACID 1 MG: 1 TABLET ORAL at 09:05

## 2020-01-01 RX ADMIN — CEFEPIME 1 G: 1 INJECTION, POWDER, FOR SOLUTION INTRAMUSCULAR; INTRAVENOUS at 15:17

## 2020-01-01 RX ADMIN — CEFEPIME 1 G: 1 INJECTION, POWDER, FOR SOLUTION INTRAMUSCULAR; INTRAVENOUS at 15:03

## 2020-01-01 RX ADMIN — SODIUM CHLORIDE, PRESERVATIVE FREE 10 ML: 5 INJECTION INTRAVENOUS at 20:44

## 2020-01-01 RX ADMIN — Medication 75 MCG/HR: at 00:06

## 2020-01-01 RX ADMIN — SODIUM CHLORIDE 12.5 ML/HR: 9 INJECTION, SOLUTION INTRAVENOUS at 03:22

## 2020-01-01 RX ADMIN — ANTI-FUNGAL POWDER MICONAZOLE NITRATE TALC FREE: 1.42 POWDER TOPICAL at 08:04

## 2020-01-01 RX ADMIN — IPRATROPIUM BROMIDE AND ALBUTEROL SULFATE 1 AMPULE: .5; 3 SOLUTION RESPIRATORY (INHALATION) at 02:13

## 2020-01-01 RX ADMIN — PROPOFOL 35 MCG/KG/MIN: 10 INJECTION, EMULSION INTRAVENOUS at 11:33

## 2020-01-01 RX ADMIN — CEFEPIME 1 G: 1 INJECTION, POWDER, FOR SOLUTION INTRAMUSCULAR; INTRAVENOUS at 14:34

## 2020-01-01 RX ADMIN — OYSTER SHELL CALCIUM WITH VITAMIN D 1 TABLET: 500; 200 TABLET, FILM COATED ORAL at 08:10

## 2020-01-01 RX ADMIN — IPRATROPIUM BROMIDE AND ALBUTEROL SULFATE 1 AMPULE: .5; 3 SOLUTION RESPIRATORY (INHALATION) at 01:48

## 2020-01-01 RX ADMIN — MIDODRINE HYDROCHLORIDE 10 MG: 5 TABLET ORAL at 12:00

## 2020-01-01 RX ADMIN — IPRATROPIUM BROMIDE AND ALBUTEROL SULFATE 1 AMPULE: .5; 3 SOLUTION RESPIRATORY (INHALATION) at 17:58

## 2020-01-01 RX ADMIN — SERTRALINE HYDROCHLORIDE 25 MG: 25 TABLET ORAL at 21:31

## 2020-01-01 RX ADMIN — DOXYCYCLINE 100 MG: 100 INJECTION, POWDER, LYOPHILIZED, FOR SOLUTION INTRAVENOUS at 12:44

## 2020-01-01 RX ADMIN — ATORVASTATIN CALCIUM 10 MG: 10 TABLET, FILM COATED ORAL at 20:42

## 2020-01-01 RX ADMIN — SODIUM CHLORIDE 12.5 ML/HR: 9 INJECTION, SOLUTION INTRAVENOUS at 11:22

## 2020-01-01 RX ADMIN — IPRATROPIUM BROMIDE AND ALBUTEROL SULFATE 1 AMPULE: .5; 3 SOLUTION RESPIRATORY (INHALATION) at 09:55

## 2020-01-01 RX ADMIN — FOLIC ACID 1 MG: 1 TABLET ORAL at 08:47

## 2020-01-01 RX ADMIN — SERTRALINE HYDROCHLORIDE 25 MG: 25 TABLET ORAL at 20:16

## 2020-01-01 RX ADMIN — INSULIN LISPRO 4 UNITS: 100 INJECTION, SOLUTION INTRAVENOUS; SUBCUTANEOUS at 11:37

## 2020-01-01 RX ADMIN — CEFEPIME 1 G: 1 INJECTION, POWDER, FOR SOLUTION INTRAMUSCULAR; INTRAVENOUS at 15:50

## 2020-01-01 RX ADMIN — CEFEPIME 1 G: 1 INJECTION, POWDER, FOR SOLUTION INTRAMUSCULAR; INTRAVENOUS at 06:49

## 2020-01-01 RX ADMIN — CALCIUM GLUCONATE 1 G: 98 INJECTION, SOLUTION INTRAVENOUS at 14:41

## 2020-01-01 RX ADMIN — HYDROXYCHLOROQUINE SULFATE 200 MG: 200 TABLET, FILM COATED ORAL at 20:39

## 2020-01-01 RX ADMIN — ANTI-FUNGAL POWDER MICONAZOLE NITRATE TALC FREE: 1.42 POWDER TOPICAL at 08:25

## 2020-01-01 RX ADMIN — IPRATROPIUM BROMIDE AND ALBUTEROL SULFATE 1 AMPULE: .5; 3 SOLUTION RESPIRATORY (INHALATION) at 21:48

## 2020-01-01 RX ADMIN — SODIUM CHLORIDE, PRESERVATIVE FREE 10 ML: 5 INJECTION INTRAVENOUS at 20:16

## 2020-01-01 RX ADMIN — SODIUM CHLORIDE 12.5 ML/HR: 9 INJECTION, SOLUTION INTRAVENOUS at 10:33

## 2020-01-01 RX ADMIN — VASOPRESSIN 0.03 UNITS/MIN: 20 INJECTION INTRAVENOUS at 09:43

## 2020-01-01 RX ADMIN — MORPHINE SULFATE 1 MG: 2 INJECTION, SOLUTION INTRAMUSCULAR; INTRAVENOUS at 17:29

## 2020-01-01 RX ADMIN — INSULIN LISPRO 4 UNITS: 100 INJECTION, SOLUTION INTRAVENOUS; SUBCUTANEOUS at 23:58

## 2020-01-01 RX ADMIN — SODIUM CHLORIDE 8 MG/HR: 9 INJECTION, SOLUTION INTRAVENOUS at 10:01

## 2020-01-01 RX ADMIN — ANTI-FUNGAL POWDER MICONAZOLE NITRATE TALC FREE: 1.42 POWDER TOPICAL at 09:07

## 2020-01-01 RX ADMIN — SODIUM CHLORIDE: 9 INJECTION, SOLUTION INTRAVENOUS at 16:48

## 2020-01-01 RX ADMIN — INSULIN LISPRO 2 UNITS: 100 INJECTION, SOLUTION INTRAVENOUS; SUBCUTANEOUS at 06:45

## 2020-01-01 RX ADMIN — ENOXAPARIN SODIUM 120 MG: 120 INJECTION SUBCUTANEOUS at 20:32

## 2020-01-01 RX ADMIN — IPRATROPIUM BROMIDE AND ALBUTEROL SULFATE 1 AMPULE: .5; 3 SOLUTION RESPIRATORY (INHALATION) at 05:17

## 2020-01-01 RX ADMIN — IPRATROPIUM BROMIDE AND ALBUTEROL SULFATE 1 AMPULE: .5; 3 SOLUTION RESPIRATORY (INHALATION) at 05:58

## 2020-01-01 RX ADMIN — APIXABAN 5 MG: 5 TABLET, FILM COATED ORAL at 20:51

## 2020-01-01 RX ADMIN — INSULIN LISPRO 2 UNITS: 100 INJECTION, SOLUTION INTRAVENOUS; SUBCUTANEOUS at 17:40

## 2020-01-01 RX ADMIN — THIAMINE HYDROCHLORIDE 200 MG: 100 INJECTION, SOLUTION INTRAMUSCULAR; INTRAVENOUS at 04:01

## 2020-01-01 RX ADMIN — PANTOPRAZOLE SODIUM 40 MG: 40 TABLET, DELAYED RELEASE ORAL at 05:26

## 2020-01-01 RX ADMIN — INSULIN LISPRO 1 UNITS: 100 INJECTION, SOLUTION INTRAVENOUS; SUBCUTANEOUS at 10:06

## 2020-01-01 RX ADMIN — ANTI-FUNGAL POWDER MICONAZOLE NITRATE TALC FREE: 1.42 POWDER TOPICAL at 08:43

## 2020-01-01 RX ADMIN — LORAZEPAM 1 MG: 2 INJECTION, SOLUTION INTRAMUSCULAR; INTRAVENOUS at 03:08

## 2020-01-01 RX ADMIN — Medication 1 CAPSULE: at 08:10

## 2020-01-01 RX ADMIN — DOXYCYCLINE HYCLATE 100 MG: 100 CAPSULE ORAL at 21:23

## 2020-01-01 RX ADMIN — APIXABAN 5 MG: 5 TABLET, FILM COATED ORAL at 20:42

## 2020-01-01 RX ADMIN — POTASSIUM BICARBONATE 20 MEQ: 782 TABLET, EFFERVESCENT ORAL at 08:09

## 2020-01-01 RX ADMIN — ALLOPURINOL 100 MG: 100 TABLET ORAL at 20:51

## 2020-01-01 RX ADMIN — IPRATROPIUM BROMIDE AND ALBUTEROL SULFATE 1 AMPULE: .5; 3 SOLUTION RESPIRATORY (INHALATION) at 15:34

## 2020-01-01 RX ADMIN — IPRATROPIUM BROMIDE AND ALBUTEROL SULFATE 1 AMPULE: .5; 3 SOLUTION RESPIRATORY (INHALATION) at 01:14

## 2020-01-01 RX ADMIN — INSULIN LISPRO 2 UNITS: 100 INJECTION, SOLUTION INTRAVENOUS; SUBCUTANEOUS at 12:15

## 2020-01-01 RX ADMIN — LEVOTHYROXINE SODIUM 250 MCG: 50 TABLET ORAL at 06:33

## 2020-01-01 RX ADMIN — ASCORBIC ACID 1500 MG: 500 INJECTION INTRAVENOUS at 12:44

## 2020-01-01 RX ADMIN — ALLOPURINOL 100 MG: 100 TABLET ORAL at 20:32

## 2020-01-01 RX ADMIN — SODIUM CHLORIDE 500 ML: 9 INJECTION, SOLUTION INTRAVENOUS at 12:30

## 2020-01-01 RX ADMIN — Medication 50 MG: at 08:42

## 2020-01-01 RX ADMIN — PROPOFOL 35 MCG/KG/MIN: 10 INJECTION, EMULSION INTRAVENOUS at 23:40

## 2020-01-01 RX ADMIN — POLYETHYLENE GLYCOL 3350 17 G: 17 POWDER, FOR SOLUTION ORAL at 20:16

## 2020-01-01 RX ADMIN — MIDODRINE HYDROCHLORIDE 10 MG: 5 TABLET ORAL at 08:42

## 2020-01-01 RX ADMIN — BUDESONIDE 500 MCG: 0.5 INHALANT RESPIRATORY (INHALATION) at 16:56

## 2020-01-01 RX ADMIN — VANCOMYCIN HYDROCHLORIDE 1500 MG: 5 INJECTION, POWDER, LYOPHILIZED, FOR SOLUTION INTRAVENOUS at 22:18

## 2020-01-01 RX ADMIN — VANCOMYCIN HYDROCHLORIDE 1500 MG: 10 INJECTION, POWDER, LYOPHILIZED, FOR SOLUTION INTRAVENOUS at 12:05

## 2020-01-01 RX ADMIN — ANTI-FUNGAL POWDER MICONAZOLE NITRATE TALC FREE: 1.42 POWDER TOPICAL at 20:33

## 2020-01-01 RX ADMIN — SODIUM CHLORIDE 12.5 ML/HR: 9 INJECTION, SOLUTION INTRAVENOUS at 03:15

## 2020-01-01 RX ADMIN — SODIUM CHLORIDE 12.5 ML/HR: 9 INJECTION, SOLUTION INTRAVENOUS at 03:30

## 2020-01-01 RX ADMIN — DOXYCYCLINE 100 MG: 100 INJECTION, POWDER, LYOPHILIZED, FOR SOLUTION INTRAVENOUS at 21:39

## 2020-01-01 RX ADMIN — NOREPINEPHRINE BITARTRATE 12 MCG/MIN: 1 INJECTION, SOLUTION, CONCENTRATE INTRAVENOUS at 04:01

## 2020-01-01 RX ADMIN — IPRATROPIUM BROMIDE AND ALBUTEROL SULFATE 1 AMPULE: .5; 3 SOLUTION RESPIRATORY (INHALATION) at 13:37

## 2020-01-01 RX ADMIN — IPRATROPIUM BROMIDE AND ALBUTEROL SULFATE 1 AMPULE: .5; 3 SOLUTION RESPIRATORY (INHALATION) at 19:43

## 2020-01-01 RX ADMIN — ANTI-FUNGAL POWDER MICONAZOLE NITRATE TALC FREE: 1.42 POWDER TOPICAL at 21:03

## 2020-01-01 RX ADMIN — LEVOTHYROXINE SODIUM 200 MCG: 200 TABLET ORAL at 06:29

## 2020-01-01 RX ADMIN — IPRATROPIUM BROMIDE AND ALBUTEROL SULFATE 1 AMPULE: .5; 3 SOLUTION RESPIRATORY (INHALATION) at 05:30

## 2020-01-01 RX ADMIN — FUROSEMIDE 20 MG: 10 INJECTION, SOLUTION INTRAMUSCULAR; INTRAVENOUS at 06:42

## 2020-01-01 RX ADMIN — SODIUM CHLORIDE 12.5 ML/HR: 9 INJECTION, SOLUTION INTRAVENOUS at 18:54

## 2020-01-01 RX ADMIN — FUROSEMIDE 40 MG: 10 INJECTION, SOLUTION INTRAMUSCULAR; INTRAVENOUS at 17:31

## 2020-01-01 RX ADMIN — DOXYCYCLINE 100 MG: 100 INJECTION, POWDER, LYOPHILIZED, FOR SOLUTION INTRAVENOUS at 11:29

## 2020-01-01 RX ADMIN — ALLOPURINOL 100 MG: 100 TABLET ORAL at 20:56

## 2020-01-01 RX ADMIN — IPRATROPIUM BROMIDE AND ALBUTEROL SULFATE 1 AMPULE: .5; 3 SOLUTION RESPIRATORY (INHALATION) at 01:17

## 2020-01-01 RX ADMIN — ASCORBIC ACID 1500 MG: 500 INJECTION INTRAVENOUS at 20:45

## 2020-01-01 RX ADMIN — SODIUM CHLORIDE, SODIUM LACTATE, POTASSIUM CHLORIDE, CALCIUM CHLORIDE AND DEXTROSE MONOHYDRATE: 5; 600; 310; 30; 20 INJECTION, SOLUTION INTRAVENOUS at 18:53

## 2020-01-01 RX ADMIN — ACETAMINOPHEN 650 MG: 325 TABLET, FILM COATED ORAL at 22:16

## 2020-01-01 RX ADMIN — SODIUM BICARBONATE 50 MEQ: 84 INJECTION, SOLUTION INTRAVENOUS at 08:53

## 2020-01-01 RX ADMIN — FUROSEMIDE 20 MG: 10 INJECTION, SOLUTION INTRAMUSCULAR; INTRAVENOUS at 02:13

## 2020-01-01 RX ADMIN — SODIUM CHLORIDE: 9 INJECTION, SOLUTION INTRAVENOUS at 05:23

## 2020-01-01 RX ADMIN — NOREPINEPHRINE BITARTRATE 9 MCG/MIN: 1 INJECTION, SOLUTION, CONCENTRATE INTRAVENOUS at 07:56

## 2020-01-01 RX ADMIN — ALLOPURINOL 100 MG: 100 TABLET ORAL at 11:21

## 2020-01-01 RX ADMIN — IPRATROPIUM BROMIDE AND ALBUTEROL SULFATE 1 AMPULE: .5; 3 SOLUTION RESPIRATORY (INHALATION) at 08:36

## 2020-01-01 RX ADMIN — ALLOPURINOL 100 MG: 100 TABLET ORAL at 08:45

## 2020-01-01 RX ADMIN — NOREPINEPHRINE BITARTRATE 6 MCG/MIN: 1 INJECTION, SOLUTION, CONCENTRATE INTRAVENOUS at 03:48

## 2020-01-01 RX ADMIN — GABAPENTIN 300 MG: 300 CAPSULE ORAL at 21:23

## 2020-01-01 RX ADMIN — NOREPINEPHRINE BITARTRATE 12 MCG/MIN: 1 INJECTION, SOLUTION, CONCENTRATE INTRAVENOUS at 05:24

## 2020-01-01 RX ADMIN — SODIUM CHLORIDE 12.5 ML/HR: 9 INJECTION, SOLUTION INTRAVENOUS at 18:36

## 2020-01-01 RX ADMIN — Medication 50 MCG/HR: at 04:35

## 2020-01-01 RX ADMIN — ALLOPURINOL 100 MG: 100 TABLET ORAL at 08:42

## 2020-01-01 RX ADMIN — ALLOPURINOL 100 MG: 100 TABLET ORAL at 20:13

## 2020-01-01 RX ADMIN — HYDROCORTISONE SODIUM SUCCINATE 50 MG: 100 INJECTION, POWDER, FOR SOLUTION INTRAMUSCULAR; INTRAVENOUS at 22:07

## 2020-01-01 RX ADMIN — CEFEPIME 1 G: 1 INJECTION, POWDER, FOR SOLUTION INTRAMUSCULAR; INTRAVENOUS at 06:57

## 2020-01-01 RX ADMIN — ASCORBIC ACID 1500 MG: 500 INJECTION INTRAVENOUS at 15:18

## 2020-01-01 RX ADMIN — OYSTER SHELL CALCIUM WITH VITAMIN D 1 TABLET: 500; 200 TABLET, FILM COATED ORAL at 12:10

## 2020-01-01 RX ADMIN — INSULIN GLARGINE 9 UNITS: 100 INJECTION, SOLUTION SUBCUTANEOUS at 21:30

## 2020-01-01 RX ADMIN — Medication 1 CAPSULE: at 08:42

## 2020-01-01 RX ADMIN — METOCLOPRAMIDE 10 MG: 5 INJECTION, SOLUTION INTRAMUSCULAR; INTRAVENOUS at 11:35

## 2020-01-01 RX ADMIN — VANCOMYCIN HYDROCHLORIDE 1500 MG: 10 INJECTION, POWDER, LYOPHILIZED, FOR SOLUTION INTRAVENOUS at 11:30

## 2020-01-01 RX ADMIN — LEVOTHYROXINE SODIUM 200 MCG: 200 TABLET ORAL at 05:26

## 2020-01-01 RX ADMIN — INSULIN LISPRO 4 UNITS: 100 INJECTION, SOLUTION INTRAVENOUS; SUBCUTANEOUS at 01:20

## 2020-01-01 RX ADMIN — SODIUM CHLORIDE 500 ML: 9 INJECTION, SOLUTION INTRAVENOUS at 11:58

## 2020-01-01 RX ADMIN — THIAMINE HYDROCHLORIDE 200 MG: 100 INJECTION, SOLUTION INTRAMUSCULAR; INTRAVENOUS at 16:51

## 2020-01-01 RX ADMIN — INSULIN LISPRO 1 UNITS: 100 INJECTION, SOLUTION INTRAVENOUS; SUBCUTANEOUS at 20:46

## 2020-01-01 RX ADMIN — IPRATROPIUM BROMIDE AND ALBUTEROL SULFATE 1 AMPULE: .5; 3 SOLUTION RESPIRATORY (INHALATION) at 17:37

## 2020-01-01 RX ADMIN — SODIUM CHLORIDE 12.5 ML/HR: 9 INJECTION, SOLUTION INTRAVENOUS at 18:23

## 2020-01-01 RX ADMIN — SERTRALINE HYDROCHLORIDE 25 MG: 25 TABLET ORAL at 21:23

## 2020-01-01 RX ADMIN — CEFEPIME 1 G: 1 INJECTION, POWDER, FOR SOLUTION INTRAMUSCULAR; INTRAVENOUS at 23:39

## 2020-01-01 RX ADMIN — ATORVASTATIN CALCIUM 10 MG: 10 TABLET, FILM COATED ORAL at 20:32

## 2020-01-01 RX ADMIN — ATORVASTATIN CALCIUM 10 MG: 10 TABLET, FILM COATED ORAL at 21:23

## 2020-01-01 RX ADMIN — ASCORBIC ACID 1500 MG: 500 INJECTION INTRAVENOUS at 18:39

## 2020-01-01 RX ADMIN — Medication 25 MCG/HR: at 05:08

## 2020-01-01 RX ADMIN — SERTRALINE HYDROCHLORIDE 25 MG: 25 TABLET ORAL at 20:56

## 2020-01-01 RX ADMIN — HEPARIN 300 UNITS: 100 SYRINGE at 21:02

## 2020-01-01 RX ADMIN — GABAPENTIN 300 MG: 300 CAPSULE ORAL at 21:29

## 2020-01-01 RX ADMIN — Medication 50 MG: at 08:26

## 2020-01-01 RX ADMIN — VANCOMYCIN HYDROCHLORIDE 1500 MG: 10 INJECTION, POWDER, LYOPHILIZED, FOR SOLUTION INTRAVENOUS at 18:28

## 2020-01-01 RX ADMIN — CEFEPIME 1 G: 1 INJECTION, POWDER, FOR SOLUTION INTRAMUSCULAR; INTRAVENOUS at 01:50

## 2020-01-01 RX ADMIN — SODIUM CHLORIDE: 9 INJECTION, SOLUTION INTRAVENOUS at 01:19

## 2020-01-01 RX ADMIN — ALLOPURINOL 100 MG: 100 TABLET ORAL at 08:30

## 2020-01-01 RX ADMIN — ATORVASTATIN CALCIUM 10 MG: 10 TABLET, FILM COATED ORAL at 20:13

## 2020-01-01 RX ADMIN — THIAMINE HYDROCHLORIDE 200 MG: 100 INJECTION, SOLUTION INTRAMUSCULAR; INTRAVENOUS at 03:22

## 2020-01-01 RX ADMIN — HYDROCORTISONE SODIUM SUCCINATE 50 MG: 100 INJECTION, POWDER, FOR SOLUTION INTRAMUSCULAR; INTRAVENOUS at 12:06

## 2020-01-01 RX ADMIN — METOCLOPRAMIDE 10 MG: 5 INJECTION, SOLUTION INTRAMUSCULAR; INTRAVENOUS at 22:08

## 2020-01-01 RX ADMIN — FUROSEMIDE 20 MG: 10 INJECTION, SOLUTION INTRAMUSCULAR; INTRAVENOUS at 09:18

## 2020-01-01 RX ADMIN — SODIUM CHLORIDE, PRESERVATIVE FREE 10 ML: 5 INJECTION INTRAVENOUS at 21:28

## 2020-01-01 RX ADMIN — POTASSIUM BICARBONATE 20 MEQ: 782 TABLET, EFFERVESCENT ORAL at 12:11

## 2020-01-01 RX ADMIN — Medication 50 MG: at 12:10

## 2020-01-01 RX ADMIN — SERTRALINE HYDROCHLORIDE 25 MG: 25 TABLET ORAL at 20:34

## 2020-01-01 RX ADMIN — FLUCONAZOLE 200 MG: 200 INJECTION, SOLUTION INTRAVENOUS at 13:56

## 2020-01-01 RX ADMIN — SODIUM CHLORIDE 8 MG/HR: 9 INJECTION, SOLUTION INTRAVENOUS at 21:59

## 2020-01-01 RX ADMIN — CEFEPIME 1 G: 1 INJECTION, POWDER, FOR SOLUTION INTRAMUSCULAR; INTRAVENOUS at 06:56

## 2020-01-01 RX ADMIN — FOLIC ACID 1 MG: 1 TABLET ORAL at 10:07

## 2020-01-01 RX ADMIN — SODIUM CHLORIDE 500 ML: 9 INJECTION, SOLUTION INTRAVENOUS at 14:41

## 2020-01-01 RX ADMIN — CEFEPIME 1 G: 1 INJECTION, POWDER, FOR SOLUTION INTRAMUSCULAR; INTRAVENOUS at 08:28

## 2020-01-01 RX ADMIN — GABAPENTIN 300 MG: 300 CAPSULE ORAL at 20:39

## 2020-01-01 RX ADMIN — IPRATROPIUM BROMIDE AND ALBUTEROL SULFATE 1 AMPULE: .5; 3 SOLUTION RESPIRATORY (INHALATION) at 19:38

## 2020-01-01 RX ADMIN — MIDAZOLAM 5 MG: 5 INJECTION INTRAMUSCULAR; INTRAVENOUS at 04:50

## 2020-01-01 RX ADMIN — SODIUM CHLORIDE, PRESERVATIVE FREE 10 ML: 5 INJECTION INTRAVENOUS at 08:50

## 2020-01-01 RX ADMIN — MIDODRINE HYDROCHLORIDE 10 MG: 5 TABLET ORAL at 17:42

## 2020-01-01 RX ADMIN — DOXYCYCLINE HYCLATE 100 MG: 100 CAPSULE ORAL at 22:09

## 2020-01-01 RX ADMIN — SODIUM CHLORIDE 12.5 ML/HR: 9 INJECTION, SOLUTION INTRAVENOUS at 11:30

## 2020-01-01 RX ADMIN — POTASSIUM BICARBONATE 20 MEQ: 782 TABLET, EFFERVESCENT ORAL at 09:27

## 2020-01-01 RX ADMIN — ANTI-FUNGAL POWDER MICONAZOLE NITRATE TALC FREE: 1.42 POWDER TOPICAL at 23:18

## 2020-01-01 RX ADMIN — INSULIN LISPRO 4 UNITS: 100 INJECTION, SOLUTION INTRAVENOUS; SUBCUTANEOUS at 05:07

## 2020-01-01 RX ADMIN — MORPHINE SULFATE 1 MG: 2 INJECTION, SOLUTION INTRAMUSCULAR; INTRAVENOUS at 02:12

## 2020-01-01 RX ADMIN — ATROPINE SULFATE 0.5 MG: 0.1 INJECTION INTRAVENOUS at 15:07

## 2020-01-01 RX ADMIN — GABAPENTIN 300 MG: 300 CAPSULE ORAL at 20:45

## 2020-01-01 RX ADMIN — APIXABAN 5 MG: 5 TABLET, FILM COATED ORAL at 11:21

## 2020-01-01 RX ADMIN — SODIUM CHLORIDE, PRESERVATIVE FREE 10 ML: 5 INJECTION INTRAVENOUS at 20:40

## 2020-01-01 RX ADMIN — IPRATROPIUM BROMIDE AND ALBUTEROL SULFATE 1 AMPULE: .5; 3 SOLUTION RESPIRATORY (INHALATION) at 05:55

## 2020-01-01 RX ADMIN — ASCORBIC ACID 1500 MG: 500 INJECTION INTRAVENOUS at 12:05

## 2020-01-01 RX ADMIN — VANCOMYCIN HYDROCHLORIDE 1500 MG: 10 INJECTION, POWDER, LYOPHILIZED, FOR SOLUTION INTRAVENOUS at 13:30

## 2020-01-01 RX ADMIN — FUROSEMIDE 40 MG: 10 INJECTION, SOLUTION INTRAMUSCULAR; INTRAVENOUS at 05:44

## 2020-01-01 RX ADMIN — SERTRALINE HYDROCHLORIDE 25 MG: 25 TABLET ORAL at 22:17

## 2020-01-01 RX ADMIN — PROPOFOL 30 MCG/KG/MIN: 10 INJECTION, EMULSION INTRAVENOUS at 01:51

## 2020-01-01 RX ADMIN — MIDODRINE HYDROCHLORIDE 10 MG: 5 TABLET ORAL at 13:55

## 2020-01-01 RX ADMIN — HYDROXYCHLOROQUINE SULFATE 400 MG: 200 TABLET, FILM COATED ORAL at 22:16

## 2020-01-01 RX ADMIN — DOXYCYCLINE HYCLATE 100 MG: 100 CAPSULE ORAL at 21:33

## 2020-01-01 RX ADMIN — APIXABAN 5 MG: 5 TABLET, FILM COATED ORAL at 12:11

## 2020-01-01 RX ADMIN — Medication 50 MG: at 10:09

## 2020-01-01 RX ADMIN — PANTOPRAZOLE SODIUM 40 MG: 40 INJECTION, POWDER, FOR SOLUTION INTRAVENOUS at 07:33

## 2020-01-01 RX ADMIN — ALLOPURINOL 100 MG: 100 TABLET ORAL at 07:35

## 2020-01-01 RX ADMIN — LEVOTHYROXINE SODIUM 200 MCG: 200 TABLET ORAL at 05:04

## 2020-01-01 RX ADMIN — SODIUM CHLORIDE, PRESERVATIVE FREE 10 ML: 5 INJECTION INTRAVENOUS at 22:16

## 2020-01-01 RX ADMIN — LEVOTHYROXINE SODIUM 200 MCG: 200 TABLET ORAL at 06:58

## 2020-01-01 RX ADMIN — IPRATROPIUM BROMIDE AND ALBUTEROL SULFATE 1 AMPULE: .5; 3 SOLUTION RESPIRATORY (INHALATION) at 23:01

## 2020-01-01 RX ADMIN — DOXYCYCLINE HYCLATE 100 MG: 100 CAPSULE ORAL at 07:36

## 2020-01-01 RX ADMIN — SODIUM CHLORIDE, PRESERVATIVE FREE 10 ML: 5 INJECTION INTRAVENOUS at 21:32

## 2020-01-01 RX ADMIN — CHLORHEXIDINE GLUCONATE 15 ML: 1.2 RINSE ORAL at 07:40

## 2020-01-01 RX ADMIN — ANTI-FUNGAL POWDER MICONAZOLE NITRATE TALC FREE: 1.42 POWDER TOPICAL at 21:28

## 2020-01-01 RX ADMIN — INSULIN LISPRO 1 UNITS: 100 INJECTION, SOLUTION INTRAVENOUS; SUBCUTANEOUS at 20:42

## 2020-01-01 RX ADMIN — HYDROCORTISONE SODIUM SUCCINATE 50 MG: 100 INJECTION, POWDER, FOR SOLUTION INTRAMUSCULAR; INTRAVENOUS at 06:19

## 2020-01-01 RX ADMIN — POTASSIUM BICARBONATE 20 MEQ: 782 TABLET, EFFERVESCENT ORAL at 09:05

## 2020-01-01 RX ADMIN — CHLORHEXIDINE GLUCONATE 15 ML: 1.2 RINSE ORAL at 09:25

## 2020-01-01 RX ADMIN — APIXABAN 5 MG: 5 TABLET, FILM COATED ORAL at 08:46

## 2020-01-01 RX ADMIN — SODIUM CHLORIDE 8 MG/HR: 9 INJECTION, SOLUTION INTRAVENOUS at 05:35

## 2020-01-01 RX ADMIN — ASCORBIC ACID 1500 MG: 500 INJECTION INTRAVENOUS at 03:31

## 2020-01-01 RX ADMIN — INSULIN LISPRO 4 UNITS: 100 INJECTION, SOLUTION INTRAVENOUS; SUBCUTANEOUS at 07:05

## 2020-01-01 RX ADMIN — Medication 50 MG: at 09:27

## 2020-01-01 RX ADMIN — Medication 1 CAPSULE: at 08:20

## 2020-01-01 RX ADMIN — FOLIC ACID 1 MG: 1 TABLET ORAL at 07:35

## 2020-01-01 RX ADMIN — ALLOPURINOL 100 MG: 100 TABLET ORAL at 09:30

## 2020-01-01 RX ADMIN — INSULIN HUMAN 10 UNITS: 100 INJECTION, SOLUTION PARENTERAL at 08:50

## 2020-01-01 RX ADMIN — ATORVASTATIN CALCIUM 10 MG: 10 TABLET, FILM COATED ORAL at 20:45

## 2020-01-01 RX ADMIN — APIXABAN 5 MG: 5 TABLET, FILM COATED ORAL at 09:00

## 2020-01-01 RX ADMIN — PROPOFOL 35 MCG/KG/MIN: 10 INJECTION, EMULSION INTRAVENOUS at 08:58

## 2020-01-01 RX ADMIN — MIDODRINE HYDROCHLORIDE 10 MG: 5 TABLET ORAL at 12:06

## 2020-01-01 RX ADMIN — NOREPINEPHRINE BITARTRATE 12 MCG/MIN: 1 INJECTION, SOLUTION, CONCENTRATE INTRAVENOUS at 05:22

## 2020-01-01 RX ADMIN — Medication 1 CAPSULE: at 08:48

## 2020-01-01 RX ADMIN — IPRATROPIUM BROMIDE AND ALBUTEROL SULFATE 1 AMPULE: .5; 3 SOLUTION RESPIRATORY (INHALATION) at 21:08

## 2020-01-01 RX ADMIN — FUROSEMIDE 20 MG: 10 INJECTION, SOLUTION INTRAMUSCULAR; INTRAVENOUS at 18:28

## 2020-01-01 RX ADMIN — HYDROXYCHLOROQUINE SULFATE 200 MG: 200 TABLET, FILM COATED ORAL at 20:51

## 2020-01-01 RX ADMIN — APIXABAN 5 MG: 5 TABLET, FILM COATED ORAL at 20:56

## 2020-01-01 RX ADMIN — POTASSIUM BICARBONATE 20 MEQ: 782 TABLET, EFFERVESCENT ORAL at 10:09

## 2020-01-01 RX ADMIN — APIXABAN 5 MG: 5 TABLET, FILM COATED ORAL at 20:39

## 2020-01-01 RX ADMIN — ANTI-FUNGAL POWDER MICONAZOLE NITRATE TALC FREE: 1.42 POWDER TOPICAL at 08:49

## 2020-01-01 RX ADMIN — MIDODRINE HYDROCHLORIDE 10 MG: 5 TABLET ORAL at 16:42

## 2020-01-01 RX ADMIN — IPRATROPIUM BROMIDE AND ALBUTEROL SULFATE 1 AMPULE: .5; 3 SOLUTION RESPIRATORY (INHALATION) at 21:31

## 2020-01-01 RX ADMIN — INSULIN LISPRO 2 UNITS: 100 INJECTION, SOLUTION INTRAVENOUS; SUBCUTANEOUS at 17:47

## 2020-01-01 RX ADMIN — PANTOPRAZOLE SODIUM 40 MG: 40 TABLET, DELAYED RELEASE ORAL at 06:38

## 2020-01-01 RX ADMIN — ATORVASTATIN CALCIUM 10 MG: 10 TABLET, FILM COATED ORAL at 20:40

## 2020-01-01 RX ADMIN — ENOXAPARIN SODIUM 120 MG: 120 INJECTION SUBCUTANEOUS at 21:23

## 2020-01-01 RX ADMIN — VANCOMYCIN HYDROCHLORIDE 1500 MG: 10 INJECTION, POWDER, LYOPHILIZED, FOR SOLUTION INTRAVENOUS at 00:57

## 2020-01-01 RX ADMIN — FOLIC ACID 1 MG: 1 TABLET ORAL at 09:30

## 2020-01-01 RX ADMIN — CALCIUM GLUCONATE 1 G: 98 INJECTION, SOLUTION INTRAVENOUS at 08:53

## 2020-01-01 RX ADMIN — MAGNESIUM SULFATE 2 G: 2 INJECTION INTRAVENOUS at 11:06

## 2020-01-01 RX ADMIN — FOLIC ACID 1 MG: 1 TABLET ORAL at 08:10

## 2020-01-01 RX ADMIN — APIXABAN 5 MG: 5 TABLET, FILM COATED ORAL at 08:30

## 2020-01-01 RX ADMIN — METOCLOPRAMIDE 10 MG: 5 INJECTION, SOLUTION INTRAMUSCULAR; INTRAVENOUS at 09:08

## 2020-01-01 RX ADMIN — INSULIN LISPRO 2 UNITS: 100 INJECTION, SOLUTION INTRAVENOUS; SUBCUTANEOUS at 18:13

## 2020-01-01 RX ADMIN — HYDROCORTISONE SODIUM SUCCINATE 50 MG: 100 INJECTION, POWDER, FOR SOLUTION INTRAMUSCULAR; INTRAVENOUS at 02:05

## 2020-01-01 RX ADMIN — HYDROXYCHLOROQUINE SULFATE 400 MG: 200 TABLET, FILM COATED ORAL at 11:21

## 2020-01-01 RX ADMIN — INSULIN LISPRO 1 UNITS: 100 INJECTION, SOLUTION INTRAVENOUS; SUBCUTANEOUS at 07:00

## 2020-01-01 RX ADMIN — Medication 1 CAPSULE: at 07:57

## 2020-01-01 RX ADMIN — HYDROCORTISONE SODIUM SUCCINATE 50 MG: 100 INJECTION, POWDER, FOR SOLUTION INTRAMUSCULAR; INTRAVENOUS at 14:52

## 2020-01-01 RX ADMIN — ANTI-FUNGAL POWDER MICONAZOLE NITRATE TALC FREE: 1.42 POWDER TOPICAL at 09:52

## 2020-01-01 RX ADMIN — CEFEPIME HYDROCHLORIDE 1 G: 1 INJECTION, POWDER, FOR SOLUTION INTRAMUSCULAR; INTRAVENOUS at 22:18

## 2020-01-01 RX ADMIN — GABAPENTIN 300 MG: 300 CAPSULE ORAL at 08:01

## 2020-01-01 RX ADMIN — METOCLOPRAMIDE 5 MG: 5 INJECTION, SOLUTION INTRAMUSCULAR; INTRAVENOUS at 10:34

## 2020-01-01 RX ADMIN — IPRATROPIUM BROMIDE AND ALBUTEROL SULFATE 1 AMPULE: .5; 3 SOLUTION RESPIRATORY (INHALATION) at 22:50

## 2020-01-01 RX ADMIN — SERTRALINE HYDROCHLORIDE 25 MG: 25 TABLET ORAL at 20:51

## 2020-01-01 RX ADMIN — DOXYCYCLINE 100 MG: 100 INJECTION, POWDER, LYOPHILIZED, FOR SOLUTION INTRAVENOUS at 20:40

## 2020-01-01 RX ADMIN — DOXYCYCLINE 100 MG: 100 INJECTION, POWDER, LYOPHILIZED, FOR SOLUTION INTRAVENOUS at 12:30

## 2020-01-01 RX ADMIN — Medication 1 CAPSULE: at 12:09

## 2020-01-01 RX ADMIN — APIXABAN 5 MG: 5 TABLET, FILM COATED ORAL at 20:45

## 2020-01-01 RX ADMIN — SODIUM CHLORIDE 12.5 ML/HR: 9 INJECTION, SOLUTION INTRAVENOUS at 18:55

## 2020-01-01 RX ADMIN — IPRATROPIUM BROMIDE AND ALBUTEROL SULFATE 1 AMPULE: .5; 3 SOLUTION RESPIRATORY (INHALATION) at 01:41

## 2020-01-01 RX ADMIN — ANTI-FUNGAL POWDER MICONAZOLE NITRATE TALC FREE: 1.42 POWDER TOPICAL at 20:54

## 2020-01-01 RX ADMIN — GABAPENTIN 300 MG: 300 CAPSULE ORAL at 14:51

## 2020-01-01 RX ADMIN — Medication 50 MCG/HR: at 23:25

## 2020-01-01 RX ADMIN — PROPOFOL 35 MCG/KG/MIN: 10 INJECTION, EMULSION INTRAVENOUS at 21:28

## 2020-01-01 RX ADMIN — FOLIC ACID 1 MG: 1 TABLET ORAL at 08:46

## 2020-01-01 RX ADMIN — Medication 50 MG: at 08:10

## 2020-01-01 RX ADMIN — ALLOPURINOL 100 MG: 100 TABLET ORAL at 20:43

## 2020-01-01 RX ADMIN — VANCOMYCIN HYDROCHLORIDE 1500 MG: 10 INJECTION, POWDER, LYOPHILIZED, FOR SOLUTION INTRAVENOUS at 00:11

## 2020-01-01 RX ADMIN — DOXYCYCLINE HYCLATE 100 MG: 100 CAPSULE ORAL at 08:42

## 2020-01-01 RX ADMIN — GABAPENTIN 300 MG: 300 CAPSULE ORAL at 15:50

## 2020-01-01 RX ADMIN — METOCLOPRAMIDE 5 MG: 5 INJECTION, SOLUTION INTRAMUSCULAR; INTRAVENOUS at 05:41

## 2020-01-01 RX ADMIN — CEFEPIME 1 G: 1 INJECTION, POWDER, FOR SOLUTION INTRAMUSCULAR; INTRAVENOUS at 23:31

## 2020-01-01 RX ADMIN — GABAPENTIN 300 MG: 300 CAPSULE ORAL at 08:09

## 2020-01-01 RX ADMIN — CEFEPIME 1 G: 1 INJECTION, POWDER, FOR SOLUTION INTRAMUSCULAR; INTRAVENOUS at 23:36

## 2020-01-01 RX ADMIN — INSULIN LISPRO 1 UNITS: 100 INJECTION, SOLUTION INTRAVENOUS; SUBCUTANEOUS at 21:40

## 2020-01-01 RX ADMIN — GABAPENTIN 300 MG: 300 CAPSULE ORAL at 22:16

## 2020-01-01 RX ADMIN — MORPHINE SULFATE 1 MG: 2 INJECTION, SOLUTION INTRAMUSCULAR; INTRAVENOUS at 23:47

## 2020-01-01 RX ADMIN — SODIUM CHLORIDE, SODIUM LACTATE, POTASSIUM CHLORIDE, CALCIUM CHLORIDE AND DEXTROSE MONOHYDRATE: 5; 600; 310; 30; 20 INJECTION, SOLUTION INTRAVENOUS at 08:45

## 2020-01-01 RX ADMIN — SODIUM CHLORIDE, PRESERVATIVE FREE 10 ML: 5 INJECTION INTRAVENOUS at 08:10

## 2020-01-01 RX ADMIN — VANCOMYCIN HYDROCHLORIDE 1500 MG: 10 INJECTION, POWDER, LYOPHILIZED, FOR SOLUTION INTRAVENOUS at 05:25

## 2020-01-01 RX ADMIN — IPRATROPIUM BROMIDE AND ALBUTEROL SULFATE 1 AMPULE: .5; 3 SOLUTION RESPIRATORY (INHALATION) at 09:09

## 2020-01-01 ASSESSMENT — PULMONARY FUNCTION TESTS
PIF_VALUE: 28
PIF_VALUE: 28
PIF_VALUE: 34
PIF_VALUE: 34
PIF_VALUE: 33
PIF_VALUE: 34
PIF_VALUE: 34
PIF_VALUE: 37
PIF_VALUE: 28
PIF_VALUE: 31
PIF_VALUE: 28
PIF_VALUE: 36
PIF_VALUE: 35
PIF_VALUE: 28
PIF_VALUE: 38
PIF_VALUE: 34
PIF_VALUE: 33
PIF_VALUE: 28
PIF_VALUE: 34
PIF_VALUE: 34
PIF_VALUE: 28
PIF_VALUE: 27
PIF_VALUE: 29
PIF_VALUE: 34
PIF_VALUE: 28
PIF_VALUE: 33
PIF_VALUE: 34
PIF_VALUE: 33
PIF_VALUE: 34
PIF_VALUE: 27
PIF_VALUE: 28
PIF_VALUE: 27
PIF_VALUE: 28
PIF_VALUE: 35
PIF_VALUE: 28
PIF_VALUE: 35
PIF_VALUE: 35
PIF_VALUE: 28
PIF_VALUE: 34
PIF_VALUE: 28
PIF_VALUE: 28
PIF_VALUE: 34
PIF_VALUE: 28
PIF_VALUE: 33
PIF_VALUE: 29
PIF_VALUE: 28
PIF_VALUE: 33
PIF_VALUE: 34
PIF_VALUE: 33
PIF_VALUE: 28
PIF_VALUE: 33
PIF_VALUE: 39
PIF_VALUE: 38
PIF_VALUE: 28
PIF_VALUE: 29
PIF_VALUE: 37
PIF_VALUE: 29
PIF_VALUE: 38
PIF_VALUE: 36
PIF_VALUE: 28
PIF_VALUE: 34
PIF_VALUE: 29
PIF_VALUE: 33
PIF_VALUE: 28
PIF_VALUE: 34
PIF_VALUE: 33
PIF_VALUE: 33
PIF_VALUE: 34
PIF_VALUE: 28
PIF_VALUE: 34
PIF_VALUE: 28
PIF_VALUE: 33
PIF_VALUE: 34
PIF_VALUE: 28
PIF_VALUE: 34
PIF_VALUE: 33
PIF_VALUE: 29
PIF_VALUE: 35
PIF_VALUE: 28
PIF_VALUE: 28
PIF_VALUE: 33
PIF_VALUE: 29
PIF_VALUE: 28
PIF_VALUE: 28
PIF_VALUE: 34
PIF_VALUE: 28
PIF_VALUE: 28
PIF_VALUE: 34
PIF_VALUE: 28
PIF_VALUE: 28
PIF_VALUE: 29
PIF_VALUE: 28
PIF_VALUE: 44
PIF_VALUE: 28
PIF_VALUE: 35
PIF_VALUE: 27
PIF_VALUE: 28
PIF_VALUE: 34
PIF_VALUE: 33
PIF_VALUE: 28
PIF_VALUE: 35
PIF_VALUE: 34
PIF_VALUE: 33
PIF_VALUE: 34
PIF_VALUE: 43
PIF_VALUE: 28
PIF_VALUE: 33
PIF_VALUE: 34
PIF_VALUE: 34
PIF_VALUE: 25
PIF_VALUE: 36
PIF_VALUE: 34
PIF_VALUE: 33
PIF_VALUE: 33
PIF_VALUE: 51
PIF_VALUE: 27
PIF_VALUE: 28
PIF_VALUE: 33
PIF_VALUE: 28
PIF_VALUE: 28
PIF_VALUE: 36
PIF_VALUE: 28
PIF_VALUE: 41
PIF_VALUE: 33
PIF_VALUE: 33
PIF_VALUE: 34

## 2020-01-01 ASSESSMENT — PAIN DESCRIPTION - DESCRIPTORS
DESCRIPTORS: ACHING

## 2020-01-01 ASSESSMENT — PAIN SCALES - GENERAL
PAINLEVEL_OUTOF10: 0
PAINLEVEL_OUTOF10: 0
PAINLEVEL_OUTOF10: 6
PAINLEVEL_OUTOF10: 0
PAINLEVEL_OUTOF10: 2
PAINLEVEL_OUTOF10: 0
PAINLEVEL_OUTOF10: 5
PAINLEVEL_OUTOF10: 0
PAINLEVEL_OUTOF10: 3
PAINLEVEL_OUTOF10: 4
PAINLEVEL_OUTOF10: 0
PAINLEVEL_OUTOF10: 5
PAINLEVEL_OUTOF10: 0
PAINLEVEL_OUTOF10: 0
PAINLEVEL_OUTOF10: 3
PAINLEVEL_OUTOF10: 0
PAINLEVEL_OUTOF10: 5
PAINLEVEL_OUTOF10: 0
PAINLEVEL_OUTOF10: 0
PAINLEVEL_OUTOF10: 5
PAINLEVEL_OUTOF10: 0
PAINLEVEL_OUTOF10: 5
PAINLEVEL_OUTOF10: 0
PAINLEVEL_OUTOF10: 0

## 2020-01-01 ASSESSMENT — PAIN DESCRIPTION - PAIN TYPE
TYPE: CHRONIC PAIN
TYPE: ACUTE PAIN

## 2020-01-01 ASSESSMENT — ENCOUNTER SYMPTOMS
NAUSEA: 0
CHEST TIGHTNESS: 0
SORE THROAT: 1
SPUTUM PRODUCTION: 0
SHORTNESS OF BREATH: 1
HEMOPTYSIS: 0
PHOTOPHOBIA: 0
RHINORRHEA: 0
VOMITING: 0
ABDOMINAL PAIN: 0
WHEEZING: 0
COUGH: 1

## 2020-01-01 ASSESSMENT — PAIN DESCRIPTION - LOCATION
LOCATION: HEAD
LOCATION: COCCYX
LOCATION: LEG
LOCATION: SACRUM
LOCATION: GENERALIZED

## 2020-01-01 ASSESSMENT — PAIN DESCRIPTION - PROGRESSION
CLINICAL_PROGRESSION: NOT CHANGED
CLINICAL_PROGRESSION: GRADUALLY WORSENING
CLINICAL_PROGRESSION: NOT CHANGED

## 2020-01-01 ASSESSMENT — PAIN DESCRIPTION - ONSET
ONSET: ON-GOING

## 2020-01-01 ASSESSMENT — PAIN DESCRIPTION - ORIENTATION: ORIENTATION: RIGHT

## 2020-01-01 ASSESSMENT — PAIN DESCRIPTION - FREQUENCY
FREQUENCY: CONTINUOUS
FREQUENCY: INTERMITTENT
FREQUENCY: CONTINUOUS

## 2020-02-14 NOTE — PROGRESS NOTES
Follow-Up Wound Progress Note    Jacob Lei  AGE: 80 y.o. GENDER: female  DOD: 1935  TODAY'S DATE:  2/14/20  Subjective:    Jacbo Lei is a 80 y.o. female who presents today for wound check. Wound Etiology :  pressure ulcer: Stage III  Wound Location :  on right sacrum Pain : {3/10     Abx : Absent       Overall Wound Assessment  Wound is has slightly improved. Size has unchanged    Objective:    /66   Pulse 84   Temp 98 °F (36.7 °C) (Oral)   Resp 20   Ht 5' 7\" (1.702 m)   Wt 247 lb (112 kg)   BMI 38.69 kg/m²     Wound   serous exudate noted  Errythema - Present    Wound 01/11/19 Sacrum Mid #5 aquired 1/17/13 (Active)   Wound Image   2/14/2020 10:50 AM   Wound Pressure Stage  3 1/11/2019 10:15 AM   Dressing Status Clean;Dry; Intact 2/14/2020 11:59 AM   Dressing Changed Changed/New 2/14/2020 11:59 AM   Dressing/Treatment Alginate with Ag;ABD 2/14/2020 11:59 AM   Wound Cleansed Rinsed/Irrigated with saline 2/14/2020 11:59 AM   Wound Length (cm) 3.1 cm 2/14/2020 10:50 AM   Wound Width (cm) 1.4 cm 2/14/2020 10:50 AM   Wound Depth (cm) 1.1 cm 2/14/2020 10:50 AM   Wound Surface Area (cm^2) 4.34 cm^2 2/14/2020 10:50 AM   Change in Wound Size % (l*w) -55 2/14/2020 10:50 AM   Wound Volume (cm^3) 4.77 cm^3 2/14/2020 10:50 AM   Wound Healing % -326 2/14/2020 10:50 AM   Post-Procedure Length (cm) 3.1 cm 2/14/2020 11:21 AM   Post-Procedure Width (cm) 1.4 cm 2/14/2020 11:21 AM   Post-Procedure Depth (cm) 1.2 cm 2/14/2020 11:21 AM   Post-Procedure Surface Area (cm^2) 4.34 cm^2 2/14/2020 11:21 AM   Post-Procedure Volume (cm^3) 5.21 cm^3 2/14/2020 11:21 AM   Wound Assessment Pink;Yellow 2/14/2020 10:50 AM   Drainage Amount Moderate 2/14/2020 11:21 AM   Drainage Description Serosanguinous 2/14/2020 11:21 AM   Odor None 2/14/2020 10:50 AM   Sirisha-wound Assessment Maceration 2/14/2020 10:50 AM   Culture Taken Yes 12/13/2019 10:59 AM   Number of days: 399       Wound 02/14/20 Leg Upper;Posterior;Right #6 acquired 2-1-20 (posterior thigh) (Active)   Wound Image   2/14/2020 10:50 AM   Wound Pressure Stage  3 2/14/2020 10:50 AM   Dressing Status Changed 2/14/2020 11:59 AM   Dressing Changed Changed/New 2/14/2020 11:59 AM   Dressing/Treatment Other (comment) 2/14/2020 11:59 AM   Wound Cleansed Rinsed/Irrigated with saline 2/14/2020 11:59 AM   Wound Length (cm) 3 cm 2/14/2020 10:50 AM   Wound Width (cm) 4.8 cm 2/14/2020 10:50 AM   Wound Depth (cm) 0.1 cm 2/14/2020 10:50 AM   Wound Surface Area (cm^2) 14.4 cm^2 2/14/2020 10:50 AM   Wound Volume (cm^3) 1.44 cm^3 2/14/2020 10:50 AM   Post-Procedure Length (cm) 3.1 cm 2/14/2020 11:21 AM   Post-Procedure Width (cm) 4.8 cm 2/14/2020 11:21 AM   Post-Procedure Depth (cm) 0.2 cm 2/14/2020 11:21 AM   Post-Procedure Surface Area (cm^2) 14.88 cm^2 2/14/2020 11:21 AM   Post-Procedure Volume (cm^3) 2.98 cm^3 2/14/2020 11:21 AM   Wound Assessment Pink;Red;Yellow 2/14/2020 10:50 AM   Drainage Amount Moderate 2/14/2020 11:21 AM   Drainage Description Serosanguinous 2/14/2020 11:21 AM   Odor None 2/14/2020 10:50 AM   Sirisha-wound Assessment Pink;Blanchable erythema;Purple 2/14/2020 10:50 AM   Number of days: 0            Assessment:     Please refer to nursing measurements and assessment regarding wound size pre and post debridement. Wound check - Care provided includes removal of existing dressing and visual inspection    Procedure: Debridement Note: Wound # 5. At 4.34 cm sq. The patient was placed in the sitting position. Lidocaine  gauze was applied  at beginning of wound evaluation. An  Excisional Debridement was performed. Using a curette ,  the wound was debrided sharply of all fibrotic, necrotic, and hyperkeratotic tissue, including a layer of surrounding healthy tissue to stimulate epithelization. The wound was excised through the level of the subcutaneous Wound Percentage debrided is 100%. Please refer to Nurses notes for pre and post debridement dimensions.    Wound was irrigated with normal saline solution. Bleeding was with a small amount of bleeding, and controlled with pressure. Patient tolerated procedure well and was given proper instruction. Diagnosis: pressure ulcer: Stage III                    Active Problems:    Pressure ulcer of coccygeal region, stage 3 (HCC)    Morbid obesity (HCC)    Type 2 diabetes mellitus, without long-term current use of insulin (HCC)  Resolved Problems:    * No resolved hospital problems. *          Plan:      Treatment & Plan: 1.Excisional Debridement                              2. Alginate                              3. Discussed appropriate home care of this wound. 4. Patient instructions were given. 5. Follow Up in 3 week(s).                                    Georgette Quinones DO   2/14/20     3:13 PM

## 2020-05-01 NOTE — PROGRESS NOTES
irrigated with normal saline solution. Bleeding was with a small amount of bleeding, and controlled with pressure. Patient tolerated procedure well and was given proper instruction. Diagnosis: pressure ulcer: Stage IV                    Active Problems:    Pressure ulcer of coccygeal region, stage 4 (HCC)    DM type 2 (diabetes mellitus, type 2) ([facility]  Resolved Problems:    * No resolved hospital problems. *          Plan:      Treatment & Plan: 1.Excisional Debridement                              2. C&S                              3. Discussed appropriate home care of this wound. 4. Patient instructions were given. 5. Follow Up in 1 week(s).                                    Gabby Carballo DO   5/1/20     1:31 PM

## 2020-05-13 PROBLEM — J18.9 PNEUMONIA: Status: ACTIVE | Noted: 2020-01-01

## 2020-05-13 PROBLEM — J96.01 ACUTE HYPOXEMIC RESPIRATORY FAILURE (HCC): Status: ACTIVE | Noted: 2020-01-01

## 2020-05-13 PROBLEM — Z86.16 HISTORY OF 2019 NOVEL CORONAVIRUS DISEASE (COVID-19): Status: ACTIVE | Noted: 2020-01-01

## 2020-05-13 NOTE — H&P
[address] [place] [address] Hospitalist Group   History and Physical      CHIEF COMPLAINT: Shortness of breath  History of Present Illness:  80 y.o. female who lives in a nursing home , past medical history  significant for dementia,  Chronic afib ,   patient is poor historian , history is taken from nursing home records and EMS,  apparently for the past  4-days has been complaining  of  shortness of breath , had  productive cough low-grade fever,   was diagnosed yesterday with COVID B19,    Today at  the nursing home O2 sat was 74% on room air,  no fever  but patient complained  of diffuse body aches,  No history of  vomiting  or diarrhea , on presentation to the  ER O2 sat was 70% on room air,  temperature 98.7,  pulse 98,  respiratory rate 26 blood pressure 122/75, chest x-ray showed diffuse pulmonary infiltrates with  possible congestive heart failure changes patient was admitted for further evaluation and treatment    Informant(s) for H&P nursing home EMS    REVIEW OF SYSTEMS:  Could not be obtained due to  history of dementia      PMH:  Past Medical History:   Diagnosis Date    Anemia     CAD (coronary artery disease)     CHF (congestive heart failure) ([address]     Decubitus ulcer of sacral region, stage 3 ([address]     Diabetes mellitus ([address]     H/O echocardiogram 2/13/16    45%, Indeterminate diastolic function    HTN (hypertension)     Stroke ([address]     Thyroid disease        Surgical History:  Past Surgical History:   Procedure Laterality Date    BREAST SURGERY      CHOLECYSTECTOMY      CYST REMOVAL      total of 16  multiple sites    EYE SURGERY      cataracts    HIP SURGERY      rt and lt replacements    HYSTERECTOMY      KNEE SURGERY      rt knee    ROTATOR CUFF REPAIR Right        Medications Prior to Admission:    Prior to Admission medications    Medication Sig Start Date End Date Taking?  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  Every effort was made to ensure accuracy; however, inadvertent computerized transcription errors may be present.     Electronically signed by Senait Del Rosario MD on 5/13/2020 at 6:14 PM

## 2020-05-13 NOTE — ED NOTES
Bed: 07  Expected date:   Expected time:   Means of arrival:   Comments:     Zhen Boss RN  05/13/20 0450

## 2020-05-13 NOTE — ED PROVIDER NOTES
Patient is an 29-year-old female that presents to the emergency room for evaluation of shortness of breath. Patient is from a nursing home and was diagnosed with COVID-19 yesterday. She has had a cough and some mild shortness of breath for the last several days. Today they noted that she was hypoxic and more tachypneic and sent her in for further evaluation. Patient admits to feeling short of breath and a mild nonproductive cough. Shortness of breath has been gradual as well as constant and moderate in severity. Nothing seems to make it better or worse. No medications were taken prior to arrival.  She does do chills and fatigue as well as a mild generalized headache. Denies nausea, vomiting, chest pain, abdominal pain. The history is provided by the patient and the nursing home. Shortness of Breath   Severity:  Moderate  Onset quality:  Gradual  Duration:  2 days  Timing:  Constant  Progression:  Worsening  Chronicity:  New  Relieved by:  Nothing  Worsened by:  Nothing  Ineffective treatments:  None tried  Associated symptoms: cough and sore throat    Associated symptoms: no abdominal pain, no chest pain, no diaphoresis, no fever, no headaches, no hemoptysis, no neck pain, no rash, no sputum production, no syncope, no vomiting and no wheezing    Risk factors: obesity    Risk factors: no hx of cancer and no hx of PE/DVT         Review of Systems   Constitutional: Positive for chills and fatigue. Negative for diaphoresis and fever. HENT: Positive for congestion and sore throat. Negative for rhinorrhea. Eyes: Negative for photophobia and visual disturbance. Respiratory: Positive for cough and shortness of breath. Negative for hemoptysis, sputum production, chest tightness and wheezing. Cardiovascular: Negative for chest pain and syncope. Gastrointestinal: Negative for abdominal pain, nausea and vomiting.    Genitourinary: Negative for decreased urine volume, difficulty urinating, dysuria, ulcer of sacral region, stage 3 ([facility] Diabetes mellitus ([facility] H/O echocardiogram, HTN (hypertension), Stroke [facility]), and Thyroid disease. Past Surgical History:  has a past surgical history that includes Hysterectomy; Breast surgery; Cholecystectomy; cyst removal; knee surgery; hip surgery; Eye surgery; and Rotator cuff repair (Right). Social History:  reports that she has quit smoking. She has never used smokeless tobacco. She reports that she does not drink alcohol or use drugs. Family History: family history is not on file. The patients home medications have been reviewed.     Allergies: Penicillins and Sulfa antibiotics    -------------------------------------------------- RESULTS -------------------------------------------------    LABS:  Results for orders placed or performed during the hospital encounter of 05/13/20   CBC Auto Differential   Result Value Ref Range    WBC 6.5 4.5 - 11.5 E9/L    RBC 3.65 3.50 - 5.50 E12/L    Hemoglobin 9.9 (L) 11.5 - 15.5 g/dL    Hematocrit 33.6 (L) 34.0 - 48.0 %    MCV 92.1 80.0 - 99.9 fL    MCH 27.1 26.0 - 35.0 pg    MCHC 29.5 (L) 32.0 - 34.5 %    RDW 18.1 (H) 11.5 - 15.0 fL    Platelets 408 948 - 934 E9/L    MPV 11.3 7.0 - 12.0 fL    Neutrophils % 88.7 (H) 43.0 - 80.0 %    Lymphocytes % 1.7 (L) 20.0 - 42.0 %    Monocytes % 6.1 2.0 - 12.0 %    Eosinophils % 1.7 0.0 - 6.0 %    Basophils % 1.7 0.0 - 2.0 %    Neutrophils Absolute 5.79 1.80 - 7.30 E9/L    Lymphocytes Absolute 0.13 (L) 1.50 - 4.00 E9/L    Monocytes Absolute 0.39 0.10 - 0.95 E9/L    Eosinophils Absolute 0.11 0.05 - 0.50 E9/L    Basophils Absolute 0.11 0.00 - 0.20 E9/L    Vacuolated Neutrophils 1+     Anisocytosis 2+     Polychromasia 1+     Poikilocytes 1+     Ovalocytes 1+    Comprehensive Metabolic Panel   Result Value Ref Range    Sodium 137 132 - 146 mmol/L    Potassium 4.7 3.5 - 5.0 mmol/L    Chloride 98 98 - 107 mmol/L    CO2 28 22 - 29 mmol/L    Anion Gap 11 7 - 16 mmol/L    Glucose 189 (H) 74

## 2020-05-14 NOTE — CONSULTS
palpitations  RESPIRATORY:    cough, sob  ENDOCRINE:    No increase thirst, urination   HEME-LYMPH:   No easy bruising or bleeding  GI:     No nausea, vomiting or diarrhea  :     No urinary complaints  NEURO:    No seizures, stroke, HA  MUSCULOSKELETAL:  No muscle aches or pain, no jointpain  SKIN:     No rash or itch, ulcer  PSYCH:    No depression or anxiety    Medications Prior to Admission: cefTRIAXone (ROCEPHIN) 1 g injection, Inject 1 g into the muscle nightly  insulin detemir (LEVEMIR) 100 UNIT/ML injection vial, Inject 20 Units into the skin nightly  insulin detemir (LEVEMIR) 100 UNIT/ML injection vial, Inject 60 Units into the skin every morning  levothyroxine (SYNTHROID) 200 MCG tablet, Take 200 mcg by mouth every morning (before breakfast)  simvastatin (ZOCOR) 20 MG tablet, Take 20 mg by mouth nightly  sertraline (ZOLOFT) 25 MG tablet, Take 25 mg by mouth nightly  carvedilol (COREG) 6.25 MG tablet, Take 6.25 mg by mouth 2 times daily (with meals)  pantoprazole (PROTONIX) 40 MG tablet, Take 40 mg by mouth 2 times daily  predniSONE (DELTASONE) 5 MG tablet, Take 5 mg by mouth daily  carboxymethylcellulose (REFRESH TEARS) 0.5 % SOLN ophthalmic solution, 1 drop 4 times daily Both eyes  apixaban (ELIQUIS) 5 MG TABS tablet, Take by mouth 2 times daily  lisinopril (PRINIVIL;ZESTRIL) 5 MG tablet, Take 5 mg by mouth daily  potassium chloride (KLOR-CON) 20 MEQ packet, Take 20 mEq by mouth daily  bisacodyl (DULCOLAX) 10 MG suppository, Place 10 mg rectally daily as needed for Constipation  ferrous sulfate 325 (65 Fe) MG tablet, Take 325 mg by mouth daily (with breakfast)  polyethylene glycol (GLYCOLAX) powder, Take 17 g by mouth as needed  gabapentin (NEURONTIN) 300 MG capsule, Take 300 mg by mouth 3 times daily.   insulin aspart (NOVOLOG FLEXPEN) 100 UNIT/ML injection pen, Inject 10 Units into the skin 3 times daily (before meals) Sliding scale   calcium-vitamin D (OSCAL-500) 500-200 MG-UNIT per tablet, Take 1 (SYNTHROID) tablet 200 mcg, 200 mcg, Oral, QAM AC, Juan Carlos Chahal MD    pantoprazole (PROTONIX) tablet 40 mg, 40 mg, Oral, QAM AC, Juan Carlos Chahal MD    sertraline (ZOLOFT) tablet 25 mg, 25 mg, Oral, Nightly, Clement Salvador MD, 25 mg at 05/13/20 2217    acetaminophen (TYLENOL) tablet 650 mg, 650 mg, Oral, Q6H PRN, 650 mg at 05/13/20 2216 **OR** acetaminophen (TYLENOL) suppository 650 mg, 650 mg, Rectal, Q6H PRN, MD Xavier Mccarthy  hydroxychloroquine (PLAQUENIL) tablet 400 mg, 400 mg, Oral, Q12H, 400 mg at 05/13/20 2216 **FOLLOWED BY** hydroxychloroquine (PLAQUENIL) tablet 200 mg, 200 mg, Oral, Q12H, Juan Carlos Chahal MD    albuterol sulfate  (90 Base) MCG/ACT inhaler 2 puff, 2 puff, Inhalation, Q4H PRN, Clement Salvador MD    insulin lispro (HUMALOG) injection vial 0-6 Units, 0-6 Units, Subcutaneous, TID WC, Juan Carlos Chahal MD    insulin lispro (HUMALOG) injection vial 0-3 Units, 0-3 Units, Subcutaneous, Nightly, Juan Carlos Chahal MD    glucose (GLUTOSE) 40 % oral gel 15 g, 15 g, Oral, PRN, Clement Salvador MD    dextrose 50 % IV solution, 12.5 g, Intravenous, PRN, Clement Salvador MD    glucagon (rDNA) injection 1 mg, 1 mg, Intramuscular, PRN, Clement Salvador MD    dextrose 5 % solution, 100 mL/hr, Intravenous, PRN, Clement Salvador MD    insulin glargine (LANTUS) injection vial 14 Units, 14 Units, Subcutaneous, Nightly, Juan Carlos Chahal MD    guaiFENesin [facility]) extended release tablet 600 mg, 600 mg, Oral, BID, Clement Salvador MD    vancomycin (VANCOCIN) 1,500 mg in dextrose 5 % 300 mL IVPB, 1,500 mg, Intravenous, Q18H, Clement Salvador MD, Stopped at 05/13/20 2348    atorvastatin (LIPITOR) tablet 10 mg, 10 mg, Oral, Nightly, Juan Carlos Chahal MD    insulin glargine (LANTUS) injection vial 10 Units, 10 Units, Subcutaneous, Once, Lucia Ramey MD  ALLERGIES     Penicillins and Sulfa 5/14/2020  2:34 PM   Wound Surface Area (cm^2) 12 cm^2 5/14/2020  2:34 PM   Change in Wound Size % (l*w) -328.57 5/14/2020  2:34 PM   Wound Volume (cm^3) 48 cm^3 5/14/2020  2:34 PM   Wound Healing % -4186 5/14/2020  2:34 PM   Post-Procedure Length (cm) 3.3 cm 5/1/2020 11:10 AM   Post-Procedure Width (cm) 1.5 cm 5/1/2020 11:10 AM   Post-Procedure Depth (cm) 2.6 cm 5/1/2020 11:10 AM   Post-Procedure Surface Area (cm^2) 4.95 cm^2 5/1/2020 11:10 AM   Post-Procedure Volume (cm^3) 12.87 cm^3 5/1/2020 11:10 AM   Wound Assessment Red;Pink; Other (Comment) 5/14/2020  9:13 AM   Drainage Amount Small 5/14/2020  2:34 PM   Drainage Description Serosanguinous 5/14/2020  2:34 PM   Odor None 5/14/2020  2:34 PM   Sirisha-wound Assessment Red;Pink 5/14/2020  9:13 AM   Culture Taken Yes 5/1/2020 11:10 AM   Number of days: 489       Wound 02/14/20 Leg Upper;Posterior;Right #6 acquired 2-1-20 (posterior thigh) (Active)   Dressing Status Changed;Clean;Dry; Intact 5/14/2020  9:13 AM   Dressing Changed Changed/New 5/14/2020  9:13 AM   Dressing/Treatment Foam 5/14/2020  9:13 AM   Wound Cleansed Rinsed/Irrigated with saline 5/13/2020  8:00 PM   Wound Length (cm) 8 cm 5/13/2020  8:00 PM   Wound Width (cm) 7.5 cm 5/13/2020  8:00 PM   Wound Depth (cm) 0 cm 5/13/2020  8:00 PM   Wound Surface Area (cm^2) 60 cm^2 5/13/2020  8:00 PM   Change in Wound Size % (l*w) -316.67 5/13/2020  8:00 PM   Wound Volume (cm^3) 0 cm^3 5/13/2020  8:00 PM   Wound Healing % 100 5/13/2020  8:00 PM   Post-Procedure Length (cm) 0.4 cm 5/1/2020 11:10 AM   Post-Procedure Width (cm) 0.2 cm 5/1/2020 11:10 AM   Post-Procedure Depth (cm) 0.1 cm 5/1/2020 11:10 AM   Post-Procedure Surface Area (cm^2) 0.08 cm^2 5/1/2020 11:10 AM   Post-Procedure Volume (cm^3) 0.01 cm^3 5/1/2020 11:10 AM   Wound Assessment Pink;Red;Brown 5/14/2020  9:13 AM   Drainage Amount None 5/14/2020  9:13 AM   Odor None 5/14/2020  9:13 AM   Sirisha-wound Assessment Red;Pink 5/14/2020  9:13 AM   Number of days: 90 DIAGNOSTICRESULTS   RADIOLOGY:   Ct Chest Wo Contrast    Result Date: 5/14/2020  EXAMINATION: CT OF THE CHEST WITHOUT CONTRAST 5/14/2020 4:23 am TECHNIQUE: CT of the chest was performed without the administration of intravenous contrast. Multiplanar reformatted images are provided for review. Dose modulation, iterative reconstruction, and/or weight based adjustment of the mA/kV was utilized to reduce the radiation dose to as low as reasonably achievable. COMPARISON: 05/13/2000 HISTORY: ORDERING SYSTEM PROVIDED HISTORY: hypoxia TECHNOLOGIST PROVIDED HISTORY: Reason for exam:->hypoxia Initial exam FINDINGS: Mediastinum: The heart is enlarged without pericardial effusion. There are small to mildly enlarged lymph nodes involving the mediastinum. The largest involving the right paratracheal space measuring up to 2 cm. No axillary lymphadenopathy. There are scattered aortic and coronary vascular calcifications. Lungs/pleura: The central airways are patent. The lungs demonstrate cephalization with bilateral ground-glass densities and right pleural effusion. Consolidation within the right middle and right lower lobe may be compressive in nature. Findings may reflect asymmetric edema or sequela of multifocal infection. No lung mass or cavitation. Upper Abdomen: No acute abnormality within the upper abdomen. Soft Tissues/Bones: No suspicious lytic or blastic osseous lesion. Cardiomegaly with asymmetric edema versus multifocal infection. Small right pleural effusion. Indeterminate mediastinal lymphadenopathy. Cardiomegaly. Xr Chest Portable    Result Date: 5/14/2020  EXAMINATION: ONE XRAY VIEW OF THE CHEST 5/13/2020 11:54 pm COMPARISON: 05/13/2020 HISTORY: ORDERING SYSTEM PROVIDED HISTORY: possible aspiration TECHNOLOGIST PROVIDED HISTORY: Reason for exam:->possible aspiration Initial exam FINDINGS: No significant change in appearance of the bilateral interstitial and airspace opacities.   Volume loss Component Value Date    BLOODCULT2 5 Days- no growth 02/12/2016    ORG Proteus mirabilis 05/01/2020    ORG Enterococcus faecalis 05/01/2020    ORG Escherichia coli 12/13/2019       WOUND/ABSCESS   Date Value Ref Range Status   05/01/2020   Final    One colony  Too fastidious for susceptibility testing. 12/13/2019 (A)  Final    Mixed delbert isolated. Further workup and sensitivity testing  is not routinely indicated and will not be performed. Mixed delbert isolated includes:  Beta hemolytic Strep species (Group B)  Corynebacteria  Alpha hemolytic species (catalase negative gram positive coccobacili)     12/13/2019 One colony  Final       Urine Culture, Routine   Date Value Ref Range Status   10/19/2018 Growth not present  Final   09/13/2016 >100,000 CFU/ml  Final   02/14/2016 >100,000 CFU/ml  Final     Patient is a 80 y.o. female who presented with   Chief Complaint   Patient presents with    Shortness of Breath     also headache, positive for COVID-19        FINAL IMPRESSION      1. Acute respiratory failure with hypoxia (HCC)    2. COVID-19    Poss aspiration   transaminitis  kala    Sacral ulcer poa       Orders Placed This Encounter   Procedures    SPECIALTY BED REQUEST    Legionella antigen, urine    Strep Pneumoniae Antigen    Culture, Respiratory, Sputum    Respiratory Panel, Molecular    Culture, Blood 1    Culture, Blood 2    XR CHEST PORTABLE    XR CHEST PORTABLE    CT CHEST WO CONTRAST    XR CHEST PORTABLE    CBC Auto Differential    Comprehensive Metabolic Panel    Troponin    Lactate, Sepsis    Brain Natriuretic Peptide    Fibrinogen    Lactate Dehydrogenase    Ferritin    D-Dimer, Quantitative    Comprehensive Metabolic Panel w/ Reflex to MG    CBC Auto Differential    Protime-INR    Lactic Acid, Plasma    COVID-19    COVID-19    COVID-19    Miscellaneous sendout 1    Lactate, Sepsis    CBC    Basic metabolic panel    Diet NPO Effective Now Exceptions are:  Other (See Comment)    Telemetry monitoring    PPE Instructions    Incentive spirometry nursing    HYPOGLYCEMIA TREATMENT: blood glucose less than 50 mg/dL and patient  ALERT and TOLERATING PO    HYPOGLYCEMIA TREATMENT: blood glucose less than 70 mg/dL and patient ALERT and TOLERATING PO    HYPOGLYCEMIA TREATMENT: blood glucose less than 70 mg/dL and patient NOT ALERT or NPO    Nursing swallow assessment    Daily weights    Intake and output    Full code    Inpatient consult to Social Work    Inpatient consult to Infectious Diseases    Pharmacy to Dose: Vancomycin    Inpatient consult to Pulmonology    Droplet Plus Isolation    OT eval and treat    PT eval and treat    Pulse Oximetry Spot Check    Initiate Oxygen Therapy Protocol    Initiate Oxygen Therapy Protocol    Nasal Cannula Oxygen    RT Communication Order    SLP eval and treat    POCT Glucose    POCT Glucose    POCT Glucose    EKG 12 Lead    EKG REPORT    Echo Complete    Notify Inpatient Wound Care Nurse to Eval and Treat    Saline lock IV    PATIENT STATUS (FROM ED OR OR/PROCEDURAL) Inpatient       cefepime (MAXIPIME) 1 g IVPB extended (mini-bag), Q8H  doxycycline (VIBRAMYCIN) 100 mg in dextrose 5 % 100 mL IVPB, Q12H  vancomycin (VANCOCIN) intermittent dosing (placeholder), RX Placeholder  hydroxychloroquine (PLAQUENIL) tablet 200 mg, Q12H    Cont atbx  Check labs    Imaging and labs were reviewed per medical records and any ID pertinent labs were addressed with the patient. The patient/FAMILY  was educated about the diagnosis, prognosis, indications, risks and benefits of treatment. An opportunity to ask questions was given to the patient/FAMILY and questions were answered. Thank you for the consult. We will follow with you.        Electronically signed by Jyotsna Moe MD on 5/14/2020 at 8:34 AM

## 2020-05-14 NOTE — PROGRESS NOTES
Speech Language Pathology      NAME:  Eliana Chappell  :  1935  DATE: 2020  ROOM:  [id number]    Patient seen for swallow therapy 15 minutes. patient educated regarding results of bedside swallow eval. Reviewed current solid/liquid consistency diet recommendation for   Dental soft (Easy to Chew) solids with  thin liquids, and discussed compensatory strategies to ensure safe PO intake. Reviewed aspiration precautions. Discussed use of SLOW rate and SMALL sips to decrease risk of aspiration with implementation of strengthening exercises to improve swallow function as the long term goal.  Patient and or family  indicated understanding of all information provided via satisfactory verbal response.     Patient Active Problem List   Diagnosis    Pressure ulcer of coccygeal region, stage 4 ([address]    Pressure ulcer, sacrum    Cardiomegaly    Acute systolic CHF (congestive heart failure) (HCC)    Atrial fibrillation ([address]    DM type 2 (diabetes mellitus, type 2) ([address]    Morbid obesity ([address]    Type 2 diabetes mellitus, without long-term current use of insulin ([address]    Acute hypoxemic respiratory failure ([address]    Pneumonia    History of 2019 novel coronavirus disease (COVID-19)       [id number]  dysphagia tx    Romina Miner MSCCC/SLP  Speech Language Pathologist  [id number]

## 2020-05-14 NOTE — PROGRESS NOTES
to level dose vancomycin due to increase in creatinine since yesterday; re-dose vancomycin 1,500 mg IV tonight and check another random level in 16 hours.  If not supratherapeutic and sCr does not continue to rise, will attempt to revert back to scheduled dosing  · Follow renal function  · Pharmacist will follow and monitor/adjust dosing as necessary      Thank you for the consult,    Hui StanleyD, BCPS 5/14/2020 12:34 PM

## 2020-05-14 NOTE — FLOWSHEET NOTE
Inpatient Wound Care    Admit Date: 5/13/2020  2:47 PM    Reason for consult: Sacral    Significant history:    Past Medical History:   Diagnosis Date    Anemia     CAD (coronary artery disease)     CHF (congestive heart failure) (HCC)     Decubitus ulcer of [facility]ral region, stage 3 [facility]     Diabetes mellitus ([facility]     H/O echocardiogram 2/13/16    45%, Indeterminate diastolic function    HTN (hypertension)     Pneumonia 5/13/2020    Stroke ([facility]     Thyroid disease        Wound history:      Findings:       05/14/20 1434   Wound 01/11/19 Sacrum Mid #5 aquired 1/17/13   Date First Assessed/Time First Assessed: 01/11/19 1015   Present on Hospital Admission: Yes  Primary Wound Type: Pressure Injury  Location: Sacrum  Wound Location Orientation: Mid  Wound Description (Comments): #5 aquired 1/17/13  Wound Outcome: (c) O. ..    Dressing Status Changed   Dressing/Treatment Foam   Wound Length (cm) 4 cm   Wound Width (cm) 3 cm   Wound Depth (cm) 4 cm   Wound Surface Area (cm^2) 12 cm^2   Change in Wound Size % (l*w) -328.57   Wound Volume (cm^3) 48 cm^3   Wound Healing % -4186   Drainage Amount Small   Drainage Description Serosanguinous   Odor None   pt needs assistance to turn in bed    Impression:  Stage 4 pressure injury of sacral    Interventions in place:  opticell and mepilex    Plan:  Cont opticell and mepiex       [facility] 5/14/2020 2:38 PM

## 2020-05-14 NOTE — PROGRESS NOTES
[x] Safety Awareness [x]  Endurance [x]  Fine Motor Coordination [] Balance [x] Vision/perception [] Sensation []   Gross Motor Coordination [] ROM [x] Delirium []                  Motor Control []    Plan of Care: OT 1-3x/week PRN during hospitalization  ADL retraining [x]   Equipment needs [x]   Neuromuscular re-education [x] Energy Conservation Techniques [x]  Functional Transfer training [x] Patient and/or Family Education [x]  Functional Mobility training [x]  Environmental Modifications [x]  Cognitive re-training []   Compensatory techniques for ADLs [x]  Splinting Needs []   Positioning to improve overall function [x]   Therapeutic Activity [x]  Therapeutic Exercise  [x]  Visual/Perceptual: []    Delirium prevention/treatment  []   Other:  []    Rehab Potential: Good for established goals     Patient / Family Goal: Not stated. Patient and/or family were instructed on functional diagnosis, prognosis/goals and OT plan of care. Demonstrated fair understanding. Low Evaluation + 38 Timed treatment minutes    Time In:1133            Time Out: 1215    Treatment Charges: Mins Units   Ther Ex  [id number]     Manual Therapy 85439     Thera Activities 97665 25 2   ADL/Home Mgt 16678 13 1   Neuro Re-ed 37600     Orthotic manage/training  82261     Non-Billable Time     Total Timed Treatment 38 3     Evaluation time includes thorough review of current medical information, gathering information on past medical history/social history and prior level of function, completion of standardized testing/informal observation of tasks, assessment of data, and development of POC/Goals.     Riley Dumont, OTR/L #[id number]

## 2020-05-14 NOTE — PROGRESS NOTES
by 1/3 grade   Balance Sitting EOB:  fair    Dynamic Standing:  fair hand held assist x 2  Sitting EOB:  good    Dynamic Standing: fair +wheeled walker      Patient is Alert & Oriented x person, place, time and situation and follows directions    Sensation:  Patient reports bilateral lower extremities numbness     Edema:  yes bilateral lower extremities    Endurance: fair       Vitals: 8 liters  SPO2 at rest 99%  SPO2 during session 93%     Patient education  Patient educated on role of Physical Therapy, risks of immobility and plan of care, ankle pumps, quad set and glut set for edema control, blood clot prevention and safety       Patient response to education:   Pt verbalized understanding Pt demonstrated skill Pt requires further education in this area   Yes Partial Yes      ASSESSMENT: Patient exhibits decreased strength, balance, coordination impairing functional mobility. Therapist educated and facilitated patient on techniques to increase safety and independence with bed mobility, balance, functional transfers, and functional mobility. Treatment:  Patient practiced and was instructed in the following treatment: Sat edge of bed 25 minutes with Minimal assist of 1 to increase dynamic sitting balance and activity tolerance. performed exercises, grooming. Stood for hygiene and steps to Head of bed. Returned to bed, assisted on bedpan. Nursing entered room to assist off bedpan when done. Therapeutic Exercises:  ankle pumps and long arc quad  x 20 reps. At end of session, patient in bed with  call light and phone within reach,   all lines and tubes intact, nursing notified. Patient would benefit from continued skilled Physical Therapy to improve functional independence and quality of life. Patient's/ family goals   get stronger        Patient and or family understand(s) diagnosis, prognosis, and plan of care.     PLAN:    Physical Therapy care will be provided in accordance with the

## 2020-05-14 NOTE — CONSULTS
on file   Relationships    Social connections     Talks on phone: Not on file     Gets together: Not on file     Attends Mosque service: Not on file     Active member of club or organization: Not on file     Attends meetings of clubs or organizations: Not on file     Relationship status: Not on file    Intimate partner violence     Fear of current or ex partner: Not on file     Emotionally abused: Not on file     Physically abused: Not on file     Forced sexual activity: Not on file   Other Topics Concern    Not on file   Social History Narrative    Not on file       MEDICAL HISTORY:  Past Medical History:   Diagnosis Date    Anemia     CAD (coronary artery disease)     [facility]     Decubitus ulcer of sacral region, stage 3 ([facility]     Diabetes mellitus ([facility]     H/O echocardiogram 2/13/16    45%, Indeterminate diastolic function    HTN (hypertension)     Pneumonia 5/13/2020    Stroke ([facility]     Thyroid disease        MEDICATIONS:   sodium chloride flush  10 mL Intravenous 2 times per day    cefepime  1 g Intravenous Q8H    doxycycline (VIBRAMYCIN) IV  100 mg Intravenous Q12H    vancomycin (VANCOCIN) intermittent dosing (placeholder)   Other RX Placeholder    lidocaine  5 mL Intradermal Once    heparin flush  3 mL Intravenous 2 times per day    sodium chloride  500 mL Intravenous Once    allopurinol  100 mg Oral BID    apixaban  5 mg Oral BID    calcium-vitamin D  1 tablet Oral Daily    carvedilol  6.25 mg Oral BID WC    gabapentin  300 mg Oral TID    levothyroxine  200 mcg Oral QAM AC    pantoprazole  40 mg Oral QAM AC    sertraline  25 mg Oral Nightly    hydroxychloroquine  200 mg Oral Q12H    insulin lispro  0-6 Units Subcutaneous TID WC    insulin lispro  0-3 Units Subcutaneous Nightly    insulin glargine  14 Units Subcutaneous Nightly    guaiFENesin  600 mg Oral BID    atorvastatin  10 mg Oral Nightly    insulin glargine  10 Units Subcutaneous Once last 72 hours. RADIOLOGY:  XR CHEST PORTABLE   Final Result   1. No significant change in diffuse airspace interstitial opacities   bilaterally likely due to edema (cardiogenic or noncardiogenic) and/or   pneumonia. 2. Moderate cardiomegaly, small to moderate right pleural effusion, and   questionable trace left pleural effusion, findings that can be seen with   congestive heart failure. 3. Unchanged right basilar passive atelectasis. Superimposed pneumonia is   not excluded. CT CHEST WO CONTRAST   Preliminary Result   Cardiomegaly with asymmetric edema versus multifocal infection. Small right pleural effusion. Indeterminate mediastinal lymphadenopathy. Cardiomegaly. XR CHEST PORTABLE   Preliminary Result   Stable chest with no change in diffuse bilateral interstitial and airspace   disease plus or minus right pleural effusion. Findings may reflect edema or   multifocal infection superimposed on chronic interstitial lung changes. XR CHEST PORTABLE   Final Result   Findings favoring pulmonary edema with small-moderate right pleural effusion,   cardiomegaly and pulmonary vascular indistinctness. IMPRESSION:  1. COVID-19 pneumonitis  2. Acute respiratory failure  3. Likely obstructive sleep apnea  4. Morbid obesity  5. History of gout  6. History of chronic atrial fibrillation on apixaban  7. History of hypertension  8. Right ventricular dysfunction, pulmonary hypertension    PLAN:  1. Oxygen has been weaned from 10 L to 6 L and the patient is maintaining saturation of approximately 92%. We will keep her on this at this time. I do not believe she requires ICU admission  2. Appropriate diet  3. Check labs in a.m. 4. Keep on apixaban at current dose  5. Agree with Protonix  6.  Elevate head of bed to 45 degrees to avoid nocturnal desaturation since it is unlikely she would wear a CPAP mask which is also relatively contraindicated in patients with COVID

## 2020-05-14 NOTE — PROGRESS NOTES
Patient passed bedside swallow. Patient began clearing throat when almost done with oral medication administration. Dr. Clementina Diggs notified of this. Dr. Clementina Diggs said it was okay to still give patient her tylenol. Right after giving patient her tylenol she coughed for the first time in front of RN, face began to turn red and SpO2 began dropping. Patient's oxygen dropped to 76% on 6L high flow NC (patient was % previously). Patient increased to 15L high flow nasal canula and SpO2 came up to 97%. Dr. Clementina Diggs and Respiratory called to bedside. See new orders. Patient currently SpO2 95% on 10L high flow NC. Continuing to monitor patient closely.

## 2020-05-14 NOTE — PROGRESS NOTES
Cardiomegaly. XR CHEST PORTABLE   Preliminary Result   Stable chest with no change in diffuse bilateral interstitial and airspace   disease plus or minus right pleural effusion. Findings may reflect edema or   multifocal infection superimposed on chronic interstitial lung changes. XR CHEST PORTABLE   Final Result   Findings favoring pulmonary edema with small-moderate right pleural effusion,   cardiomegaly and pulmonary vascular indistinctness. XR CHEST PORTABLE    (Results Pending)       Assessment:  Active Problems:    Acute hypoxemic respiratory failure POA  Multilobar  pneumonia suspect aspiration related  Dysphagia  Recent  COVID B19 infection  Stage III sacral decubitus ulcer present on admission  Functional quadriplegia due to advanced diabetic polyneuropathy, and chronic back pain  Chronic Congestive heart failure with diastolic  dysfunction   Chronic normocytic anemia  Chronic Atrial fibrillation  Elevated fibrinogen  Diabetes mellitus type 2 uncontrolled with associated hyperglycemia  Hypoalbuminemia      Morbid obesity (HCC) with  BMI 40       Plan:  Hypoxemic respiratory failure  Most likely due to   viral pneumonia superimposed  With aspiration pneumonitis as patient choked and vomited last night, continue  Plaquenil as well as broad-spectrum antibiotics, incentive pulmonary toilet will consult pulmonology as well as infectious disease  Speech to evaluate swallowing n.p.o. for now  Will provide special mattress wound care consult  Clinically patient does not seem in heart failure ,   actually is volume depleted,  And was febrile last night  start gentle hydration  Check iron K95 folic acid  Rate control on Eliquis for anticoagulation  Due to underlying COVID infection  Titrate medications      NOTE: This report was transcribed using voice recognition software. Every effort was made to ensure accuracy; however, inadvertent computerized transcription errors may be present.

## 2020-05-15 NOTE — PROGRESS NOTES
dextrose, glucagon (rDNA), dextrose      REVIEW OF SYSTEMS:  Constitutional: Denies fever, weight loss, night sweats, and fatigue  Skin: Denies pigmentation, dark lesions, and rashes   HEENT: Denies hearing loss, tinnitus, ear drainage, epistaxis, sore throat, and hoarseness. Cardiovascular: Denies palpitations, chest pain, and chest pressure. Respiratory: Admits to cough, dyspnea at rest, but denies hemoptysis, apnea, and choking. Gastrointestinal: Denies nausea, vomiting, poor appetite, diarrhea, heartburn or reflux  Genitourinary: Denies dysuria, frequency, urgency or hematuria  Musculoskeletal: Denies myalgias, muscle weakness, and bone pain  Neurological: Denies dizziness, vertigo, headache, and focal weakness  Psychological: Denies anxiety and depression  Endocrine: Denies heat intolerance and cold intolerance  Hematopoietic/Lymphatic: Denies bleeding problems and blood transfusions    OBJECTIVE:  Vitals:    05/15/20 0815   BP: 117/61   Pulse: 83   Resp: 17   Temp: 99.1 °F (37.3 °C)   SpO2: 97%     FiO2 : 6 %  O2 Flow Rate (L/min): 8 L/min  O2 Device: High flow nasal cannula    PHYSICAL EXAM:  Constitutional: Significant fever, chills, diaphoresis  Skin: No skin rash, no skin breakdown  HEENT: Unremarkable other than small oropharyngeal opening  Neck: Neck circumference, no JVD, lymphadenopathy, thyromegaly  Cardiovascular: S1, S2 normal no S3 murmurs or rubs present  Respiratory: Inspiratory crackles over both posterior lung fields  Gastrointestinal: Obese, soft, nontender  Genitourinary: No CVA tenderness  Extremities: No clubbing, cyanosis, or edema  Neurological: Confused but pleasant, moves all extremities.   No focal motor deficits  Psychological: Affect appropriate    LABS:  WBC   Date Value Ref Range Status   05/15/2020 6.6 4.5 - 11.5 E9/L Final   05/14/2020 9.3 4.5 - 11.5 E9/L Final   05/13/2020 6.5 4.5 - 11.5 E9/L Final     Hemoglobin   Date Value Ref Range Status   05/15/2020 9.6 (L) 11.5 - 15.5 (H) 70 - 110 mg/dL Final     Calcium   Date Value Ref Range Status   05/15/2020 8.3 (L) 8.6 - 10.2 mg/dL Final   05/14/2020 8.4 (L) 8.6 - 10.2 mg/dL Final   05/13/2020 8.6 8.6 - 10.2 mg/dL Final     Total Protein   Date Value Ref Range Status   05/14/2020 7.4 6.4 - 8.3 g/dL Final   05/13/2020 7.6 6.4 - 8.3 g/dL Final   10/19/2018 8.4 (H) 6.4 - 8.3 g/dL Final     Albumin   Date Value Ref Range Status   04/17/2012 3.7 3.2 - 4.8 g/dL Final   01/10/2012 3.9 3.2 - 4.8 g/dL Final   06/14/2011 4.0 3.2 - 4.8 g/dL Final     Alb   Date Value Ref Range Status   05/14/2020 2.6 (L) 3.5 - 5.2 g/dL Final   05/13/2020 2.5 (L) 3.5 - 5.2 g/dL Final   10/19/2018 3.8 3.5 - 5.2 g/dL Final     Total Bilirubin   Date Value Ref Range Status   05/14/2020 0.5 0.0 - 1.2 mg/dL Final   05/13/2020 0.5 0.0 - 1.2 mg/dL Final   10/19/2018 0.6 0.0 - 1.2 mg/dL Final     Alkaline Phosphatase   Date Value Ref Range Status   05/14/2020 189 (H) 35 - 104 U/L Final   05/13/2020 185 (H) 35 - 104 U/L Final   10/19/2018 134 (H) 35 - 104 U/L Final     AST   Date Value Ref Range Status   05/14/2020 79 (H) 0 - 31 U/L Final     Comment:     Specimen is slightly Hemolyzed. Result may be artificially increased. 05/13/2020 99 (H) 0 - 31 U/L Final   10/19/2018 35 (H) 0 - 31 U/L Final     Comment:     Specimen is slightly Hemolyzed. Result may be artificially increased. ALT   Date Value Ref Range Status   05/14/2020 43 (H) 0 - 32 U/L Final   05/13/2020 52 (H) 0 - 32 U/L Final   10/19/2018 14 0 - 32 U/L Final     GFR Non-   Date Value Ref Range Status   05/15/2020 >60 >=60 mL/min/1.73 Final     Comment:     Chronic Kidney Disease: less than 60 ml/min/1.73 sq.m. Kidney Failure: less than 15 ml/min/1.73 sq.m. Results valid for patients 18 years and older. 05/14/2020 47 >=60 mL/min/1.73 Final     Comment:     Chronic Kidney Disease: less than 60 ml/min/1.73 sq.m. Kidney Failure: less than 15 ml/min/1.73 sq.m.   Results valid for

## 2020-05-15 NOTE — ACP (ADVANCE CARE PLANNING)
act on the patient's behalf when appropriate. Care Preferences    Ventilation: \"If you were in your present state of health and suddenly became very ill and were unable to breathe on your own, what would your preference be about the use of a ventilator (breathing machine) if it were available to you? \"      Would the patient desire the use of ventilator (breathing machine)?: yes    \"If your health worsens and it becomes clear that your chance of recovery is unlikely, what would your preference be about the use of a ventilator (breathing machine) if it were available to you? \"     Would the patient desire the use of ventilator (breathing machine)?: Yes      Resuscitation  \"CPR works best to restart the heart when there is a sudden event, like a heart attack, in someone who is otherwise healthy. Unfortunately, CPR does not typically restart the heart for people who have serious health conditions or who are very sick. \"    \"In the event your heart stopped as a result of an underlying serious health condition, would you want attempts to be made to restart your heart (answer \"yes\" for attempt to resuscitate) or would you prefer a natural death (answer \"no\" for do not attempt to resuscitate)? \" yes      NOTE: If the patient has a valid advance directive AND now provides care preference(s) that are inconsistent with that prior directive, advise the patient to consider either: creating a new advance directive that complies with state-specific requirements; or, if that is not possible, orally revoking that prior directive in accordance with state-specific requirements, which must be documented in the EHR. [x] Yes   [] No   Educated Patient / Christy Brunner regarding differences between Advance Directives and portable DNR orders.     Length of ACP Conversation in minutes:  15    Conversation Outcomes:  [x] ACP discussion completed  [] Existing advance directive reviewed with patient; no changes to patient's previously

## 2020-05-15 NOTE — CARE COORDINATION
5-15-Cm note: spoke to noreen Rodriguez , completed ACP .  Electronically signed by Jose Guadalupe Cardozo RN on 5/15/2020 at 12:39 PM

## 2020-05-15 NOTE — PROGRESS NOTES
Right arm pt unable to hold arm out, attempted left arm noted depth of pts veins brachial and basilic over 4cm unable to reach with needle, cephalic vein accessible unable to advance guide wire attempted times 2, pressure held unsuccessful, RN aware

## 2020-05-15 NOTE — PROGRESS NOTES
[address] [place], [place] [address]ist   Progress Note    Admitting Date and Time: 5/13/2020  2:47 PM  Admit Dx: Acute hypoxemic respiratory failure ([address] [J96.01]    Subjective:    Pt feels better shortness of breath improving  Per RN: Good Urine output overnight    ROS: denies fever, chills, cp, sob, n/v, HA unless stated above.      furosemide  20 mg Oral BID    potassium chloride  10 mEq Oral BID    lactobacillus  1 capsule Oral Daily    sodium chloride flush  10 mL Intravenous 2 times per day    cefepime  1 g Intravenous Q8H    doxycycline (VIBRAMYCIN) IV  100 mg Intravenous Q12H    vancomycin (VANCOCIN) intermittent dosing (placeholder)   Other RX Placeholder    lidocaine  5 mL Intradermal Once    heparin flush  3 mL Intravenous 2 times per day    zinc gluconate  50 mg Oral Daily    ascorbic acid  1,500 mg Intravenous Q6H    thiamine (VITAMIN B1) IVPB  200 mg Intravenous Q12H    allopurinol  100 mg Oral BID    apixaban  5 mg Oral BID    calcium-vitamin D  1 tablet Oral Daily    gabapentin  300 mg Oral TID    levothyroxine  200 mcg Oral QAM AC    pantoprazole  40 mg Oral QAM AC    sertraline  25 mg Oral Nightly    hydroxychloroquine  200 mg Oral Q12H    insulin lispro  0-6 Units Subcutaneous TID WC    insulin lispro  0-3 Units Subcutaneous Nightly    insulin glargine  14 Units Subcutaneous Nightly    atorvastatin  10 mg Oral Nightly    insulin glargine  10 Units Subcutaneous Once     sodium chloride flush, 10 mL, PRN  sodium chloride flush, 10 mL, PRN  heparin flush, 3 mL, PRN  acetaminophen, 650 mg, Q6H PRN    Or  acetaminophen, 650 mg, Q6H PRN  albuterol sulfate HFA, 2 puff, Q4H PRN  glucose, 15 g, PRN  dextrose, 12.5 g, PRN  glucagon (rDNA), 1 mg, PRN  dextrose, 100 mL/hr, PRN         Objective:    /61   Pulse 83   Temp 99.1 °F (37.3 °C)   Resp 17   Ht 5' 5\" (1.651 m)   Wt 279 lb 3.2 oz (126.6 kg)   SpO2 97%   BMI 46.46 kg/m²   General Appearance: alert and oriented to person, place and time and in no acute distress  Skin: warm and dry  Head: normocephalic and atraumatic  Eyes: pupils equal, round, and reactive to light, extraocular eye movements intact, conjunctivae normal  Neck: neck supple and non tender without mass   Pulmonary/Chest: Rales in both lung bases scattered rhonchi in right middle and right lower lobe  Cardiovascular: normal rate, normal S1 and S2 and no carotid bruits, increase second pulmonary sound, systolic ejection murmur 2 out of 6 left upper sternal border  Abdomen: soft, non-tender, non-distended, normal bowel sounds, no masses or organomegaly  Extremities: Stage IV pressure ulcer in sacral area  Neurologic: no cranial nerve deficit and speech normal      Recent Labs     05/13/20  1525 05/14/20  0609    136   K 4.7 4.4   CL 98 94*   CO2 28 29   BUN 39* 40*   CREATININE 0.8 1.1*   GLUCOSE 189* 144*   CALCIUM 8.6 8.4*       Recent Labs     05/13/20  1525 05/14/20  0609   ALKPHOS 185* 189*   PROT 7.6 7.4   LABALBU 2.5* 2.6*   BILITOT 0.5 0.5   AST 99* 79*   ALT 52* 43*       Recent Labs     05/13/20  1525 05/14/20  0609 05/15/20  0853   WBC 6.5 9.3 6.6   RBC 3.65 3.28* 3.53   HGB 9.9* 9.0* 9.6*   HCT 33.6* 29.6* 32.8*   MCV 92.1 90.2 92.9   MCH 27.1 27.4 27.2   MCHC 29.5* 30.4* 29.3*   RDW 18.1* 18.5* 18.2*    246 230   MPV 11.3 11.4 11.0       CBC:   Lab Results   Component Value Date    WBC 6.6 05/15/2020    RBC 3.53 05/15/2020    HGB 9.6 05/15/2020    HCT 32.8 05/15/2020    MCV 92.9 05/15/2020    MCH 27.2 05/15/2020    MCHC 29.3 05/15/2020    RDW 18.2 05/15/2020     05/15/2020    MPV 11.0 05/15/2020     BMP:    Lab Results   Component Value Date     05/15/2020    K 4.1 05/15/2020    K 4.4 05/14/2020    CL 97 05/15/2020    CO2 26 05/15/2020    BUN 35 05/15/2020    LABALBU 2.6 05/14/2020    LABALBU 3.7 04/17/2012    CREATININE 0.8 05/15/2020    CALCIUM 8.3 05/15/2020    GFRAA >60 05/15/2020    LABGLOM >60 05/15/2020    GLUCOSE 174

## 2020-05-15 NOTE — PROGRESS NOTES
10% of affected joints    Strength BUE: 4-/5  RLE:  3+/5  LLE:  3+/5   Increase strength in affected mm groups by 1/3 grade   Balance Sitting EOB:  fair    Dynamic Standing:  fair hand held assist x 2 Sitting EOB:  fair   Dynamic Standing:  not assessed   Sitting EOB:  good    Dynamic Standing: fair +wheeled walker      Patient is Alert & Oriented x person, place, time and situation  and follows directions    Sensation:  Pt denies numbness and tingling to extremities  Edema:  yes bilateral lower extremities    Vitals:  Blood Pressure at rest  Blood Pressure post session    Heart Rate at rest  Heart Rate post activity    SPO2 at rest 92% on 6L SPO2 post activity 85%      Patient education  Patient educated on role of Physical Therapy, risks of immobility and plan of care, purse lipped breathing, energy conservation and safety      Patient response to education:   Pt verbalized understanding Pt demonstrated skill Pt requires further education in this area   Yes Partial Yes       ASSESSMENT:    Comments:  Pt needing consistent cueing throughout tx session for pursed lip breathing. Pt tends to hold her breath especially with any type of activity. Pt's pulse ox dropped to low to mid 80's at times and pt needed reminded to breathe and calm down from her anxiety. Pt not appropriate to try and stand this tx session d/t low pulse ox and her anxiety. Treatment:  Patient practiced and was instructed in the following treatment:  Pt transferred to edge of bed. Sat edge of bed 15 minutes with Supervision  to increase dynamic sitting balance and activity tolerance. Pt stated she had to use the bedpan. Returned pt to supine, assisted pt on/off bedpan, rolled multiple times for personal hygiene and straightened out bed linens. Therapeutic Exercises: not performed        At end of session, patient in bed with alarm call light and phone within reach,  all lines and tubes intact, nursing notified.      Patient would benefit from continued skilled Physical Therapy to improve functional independence and quality of life. PLAN:    Patient is making poor progress towards established goals. Will continue with current Plan of care. Time in  10:17  Time out  11:00    Total Treatment Time  43 minutes    CPT codes:  Therapeutic activities (16932)   43 minutes  3 unit(s)    Layla Found.  Hemanth  [facility]  LIC # [id number]

## 2020-05-15 NOTE — PROGRESS NOTES
(37.3 °C) (Oral)   Resp 17   Ht 5' 5\" (1.651 m)   Wt 279 lb 3.2 oz (126.6 kg)   SpO2 97%   BMI 46.46 kg/m²   Temp  Av.2 °F (36.8 °C)  Min: 97.1 °F (36.2 °C)  Max: 99.1 °F (37.3 °C)  Constitutional:  The patient is awake, alert, and oriented.    CNS    AAxO   Lines: pIV    Radiology:  Laboratory and Tests Review:  Lab Results   Component Value Date    WBC 6.6 05/15/2020    WBC 9.3 2020    WBC 6.5 2020    HGB 9.6 (L) 05/15/2020    HCT 32.8 (L) 05/15/2020    MCV 92.9 05/15/2020     05/15/2020     No results found for: [facility]  Lab Results   Component Value Date    ALT 43 (H) 2020    AST 79 (H) 2020    ALKPHOS 189 (H) 2020    BILITOT 0.5 2020     Lab Results   Component Value Date     05/15/2020    K 4.1 05/15/2020    K 4.4 2020    CL 97 05/15/2020    CO2 26 05/15/2020    BUN 35 05/15/2020    CREATININE 0.8 05/15/2020    CREATININE 1.1 2020    CREATININE 0.8 2020    GFRAA >60 05/15/2020    LABGLOM >60 05/15/2020    GLUCOSE 174 05/15/2020    GLUCOSE 208 2012    PROT 7.4 2020    LABALBU 2.6 2020    LABALBU 3.7 2012    CALCIUM 8.3 05/15/2020    BILITOT 0.5 2020    ALKPHOS 189 2020    AST 79 2020    ALT 43 2020     Lab Results   Component Value Date    CRP 6.2 (H) 2020    CRP 4.0 (H) 10/19/2018     Lab Results   Component Value Date    SEDRATE 83 (H) 10/19/2018       Microbiology:   Recent Labs     20  0935   COVID19 DETECTED*     Recent Labs     20  1525 20  0620  0900 05/15/20  0853   CRP  --   --  6.2*  --    FERRITIN  --  652  --   --    LDH  --  297*  --   --    TROPONINI 0.02  --   --   --    DDIMER  --  1734  --  1715   FIBRINOGEN  --  579*  --  599*   INR  --  2.2  --   --    PROTIME  --  25.0*  --   --    AST 99* 79*  --   --    ALT 52* 43*  --   --      Lab Results   Component Value Date    CHOL 165 09/10/2018    TRIG 249 09/10/2018    HDL 55 09/10/2018 the care of Ruchi Ortiz I will continue to follow. Please do not hesitate to call for any questions or concerns.     Electronically signed by Rudy Trotter MD on 5/15/2020 at 1:18 PM

## 2020-05-15 NOTE — PROGRESS NOTES
Speech Language Pathology      NAME:  Say Click  :  1935  DATE: 5/15/2020  ROOM:  8957/5251-16    Patient seen for swallow therapy 10 minutes. Patient finishing lunch fair intake. Tolerating dental soft well. No cough or change in vocal quality with thin liquids. Nursing also reports patient doing well.   Will continue POC      Acute hypoxemic respiratory failure ([facility] [address] [J96.01]    [id number]  dysphagia tx    Giancarlo Martins MSCCC/SLP  Speech Language Pathologist  [id number]

## 2020-05-15 NOTE — PROGRESS NOTES
Patient lives in a skilled nursing facility     Equipment owned: Wheelchair and [employer],       [address]   How much difficulty turning over in bed?: A Lot  How much difficulty sitting down on / standing up from a chair with arms?: A Lot  How much difficulty moving from lying on back to sitting on side of bed?: A Lot  How much help from another person moving to and from a bed to a chair?: A Lot  How much help from another person needed to walk in hospital room?: Total  How much help from another person for climbing 3-5 steps with a railing?: Total  AM-PAC Inpatient Mobility Raw Score : 10  AM-PAC Inpatient T-Scale Score : 32.29  Mobility Inpatient CMS 0-100% Score: 76.75  Mobility Inpatient CMS G-Code Modifier : CL    Nursing cleared patient for PT treatment. OBJECTIVE:   Initial Evaluation  Date: 5/14/2020 Treatment Date:  5/15/2020       Short Term/ Long Term   Goals   Was pt agreeable to Eval/treatment? Yes   yes To be met in 5 days   Pain level   4/10  buttocks No number given for pain with her buttocks    Bed Mobility    Rolling: Maximal assist of 1    Supine to sit: Maximal assist of  2    Sit to supine: Maximal assist of  2    Scooting: Maximal assist of  2   Rolling: Moderate assist of  2   Supine to sit: Moderate assist of  2   Sit to supine: Moderate assist of  2   Scooting: Moderate assist of  2    Rolling: Moderate assist of 1    Supine to sit: Moderate assist of 1    Sit to supine: Moderate assist of 1    Scooting: Moderate assist of 1     Transfers Sit to stand: Maximal assist of  2 chux Sit to stand: Moderate assist of  2       Sit to stand:  Moderate assist of 1 wheeled walker    Ambulation    3 side steps using  hand held assist with Moderate assist of  2     not assessed     20 feet using  wheeled walker with Moderate assist of 1    Stair negotiation: ascended and descended   Not assessed            ROM Limited shldr elevation    Increase range

## 2020-05-16 NOTE — PROGRESS NOTES
Spoke with Dr. Lantigua Nicely regarding required extra need for oxygen and desat with any activity.

## 2020-05-16 NOTE — PROGRESS NOTES
153 05/16/2020    GLUCOSE 208 04/17/2012        Radiology:   XR CHEST PORTABLE   Final Result   1. No significant change in diffuse airspace interstitial opacities   bilaterally likely due to edema (cardiogenic or noncardiogenic) and/or   pneumonia. 2. Moderate cardiomegaly, small to moderate right pleural effusion, and   questionable trace left pleural effusion, findings that can be seen with   congestive heart failure. 3. Unchanged right basilar passive atelectasis. Superimposed pneumonia is   not excluded. CT CHEST WO CONTRAST   Final Result   Cardiomegaly with asymmetric edema versus multifocal infection. Small right pleural effusion. Indeterminate mediastinal lymphadenopathy. Cardiomegaly. XR CHEST PORTABLE   Final Result   Stable chest with no change in diffuse bilateral interstitial and airspace   disease plus or minus right pleural effusion. Findings may reflect edema or   multifocal infection superimposed on chronic interstitial lung changes. XR CHEST PORTABLE   Final Result   Findings favoring pulmonary edema with small-moderate right pleural effusion,   cardiomegaly and pulmonary vascular indistinctness. XR CHEST PORTABLE    (Results Pending)       Assessment:  1. A\cute hypoxemic respiratory failure  2. Multilobar pneumonia with extensive airspace disease possibly positive COVID-19  3.  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

## 2020-05-16 NOTE — PROGRESS NOTES
gabapentin  300 mg Oral TID    levothyroxine  200 mcg Oral QAM AC    pantoprazole  40 mg Oral QAM AC    sertraline  25 mg Oral Nightly    hydroxychloroquine  200 mg Oral Q12H    insulin lispro  0-6 Units Subcutaneous TID WC    insulin lispro  0-3 Units Subcutaneous Nightly    atorvastatin  10 mg Oral Nightly    insulin glargine  10 Units Subcutaneous Once      sodium chloride Stopped (05/16/20 0522)    dextrose       sodium chloride flush, sodium chloride flush, heparin flush, acetaminophen **OR** acetaminophen, albuterol sulfate HFA, glucose, dextrose, glucagon (rDNA), dextrose      REVIEW OF SYSTEMS:  Constitutional: Denies fever, weight loss, night sweats, and fatigue  Skin: Denies pigmentation, dark lesions, and rashes   HEENT: Denies hearing loss, tinnitus, ear drainage, epistaxis, sore throat, and hoarseness. Cardiovascular: Denies palpitations, chest pain, and chest pressure. Respiratory: Denies cough, dyspnea at rest, hemoptysis, apnea, and choking.   Gastrointestinal: Denies nausea, vomiting, poor appetite, diarrhea, heartburn or reflux  Genitourinary: Denies dysuria, frequency, urgency or hematuria  Musculoskeletal: Denies myalgias, muscle weakness, and bone pain  Neurological: Denies dizziness, vertigo, headache, and focal weakness  Psychological: Denies anxiety and depression  Endocrine: Denies heat intolerance and cold intolerance  Hematopoietic/Lymphatic: Denies bleeding problems and blood transfusions    OBJECTIVE:  Vitals:    05/16/20 1000   BP: 135/74   Pulse: 63   Resp: 17   Temp:    SpO2: 98%     FiO2 : 6 %  O2 Flow Rate (L/min): 15 L/min  O2 Device: High flow nasal cannula    PHYSICAL EXAM:  Constitutional: *Mild to moderate respiratory distress**  Skin: Sacral wound  HEENT: No blood  Neck: Supple no JVD  Cardiovascular: S1-S2 irregular,  Respiratory: Bilateral crackles  Gastrointestinal: Obese soft nontender  Genitourinary: No blood, no CVA tenderness  Extremities: No cyanosis  Neurological: Awake alert no focal deficit  Psychological: Appropriate affect    LABS:  WBC   Date Value Ref Range Status   05/16/2020 2.7 (L) 4.5 - 11.5 E9/L Final   05/15/2020 6.6 4.5 - 11.5 E9/L Final   05/14/2020 9.3 4.5 - 11.5 E9/L Final     Hemoglobin   Date Value Ref Range Status   05/16/2020 8.8 (L) 11.5 - 15.5 g/dL Final   05/15/2020 9.6 (L) 11.5 - 15.5 g/dL Final   05/14/2020 9.0 (L) 11.5 - 15.5 g/dL Final     Hematocrit   Date Value Ref Range Status   05/16/2020 29.1 (L) 34.0 - 48.0 % Final   05/15/2020 32.8 (L) 34.0 - 48.0 % Final   05/14/2020 29.6 (L) 34.0 - 48.0 % Final     MCV   Date Value Ref Range Status   05/16/2020 91.2 80.0 - 99.9 fL Final   05/15/2020 92.9 80.0 - 99.9 fL Final   05/14/2020 90.2 80.0 - 99.9 fL Final     Platelets   Date Value Ref Range Status   05/16/2020 216 130 - 450 E9/L Final   05/15/2020 230 130 - 450 E9/L Final   05/14/2020 246 130 - 450 E9/L Final     Sodium   Date Value Ref Range Status   05/16/2020 136 132 - 146 mmol/L Final   05/15/2020 134 132 - 146 mmol/L Final   05/14/2020 136 132 - 146 mmol/L Final     Potassium   Date Value Ref Range Status   05/16/2020 3.7 3.5 - 5.0 mmol/L Final   05/15/2020 4.1 3.5 - 5.0 mmol/L Final   05/13/2020 4.7 3.5 - 5.0 mmol/L Final     Potassium reflex Magnesium   Date Value Ref Range Status   05/14/2020 4.4 3.5 - 5.0 mmol/L Final   10/19/2018 4.4 3.5 - 5.0 mmol/L Final     Chloride   Date Value Ref Range Status   05/16/2020 97 (L) 98 - 107 mmol/L Final   05/15/2020 97 (L) 98 - 107 mmol/L Final   05/14/2020 94 (L) 98 - 107 mmol/L Final     CO2   Date Value Ref Range Status   05/16/2020 29 22 - 29 mmol/L Final   05/15/2020 26 22 - 29 mmol/L Final   05/14/2020 29 22 - 29 mmol/L Final     BUN   Date Value Ref Range Status   05/16/2020 33 (H) 8 - 23 mg/dL Final   05/15/2020 35 (H) 8 - 23 mg/dL Final   05/14/2020 40 (H) 8 - 23 mg/dL Final     CREATININE   Date Value Ref Range Status   05/16/2020 0.8 0.5 - 1.0 mg/dL Final   05/15/2020 ml/min/1.73 sq.m. Results valid for patients 18 years and older. 05/15/2020 >60 >=60 mL/min/1.73 Final     Comment:     Chronic Kidney Disease: less than 60 ml/min/1.73 sq.m. Kidney Failure: less than 15 ml/min/1.73 sq.m. Results valid for patients 18 years and older. 05/14/2020 47 >=60 mL/min/1.73 Final     Comment:     Chronic Kidney Disease: less than 60 ml/min/1.73 sq.m. Kidney Failure: less than 15 ml/min/1.73 sq.m. Results valid for patients 18 years and older. GFR    Date Value Ref Range Status   05/16/2020 >60  Final   05/15/2020 >60  Final   05/14/2020 57  Final     Magnesium   Date Value Ref Range Status   05/16/2020 1.8 1.6 - 2.6 mg/dL Final   05/15/2020 1.8 1.6 - 2.6 mg/dL Final     Phosphorus   Date Value Ref Range Status   05/16/2020 3.1 2.5 - 4.5 mg/dL Final   05/15/2020 3.1 2.5 - 4.5 mg/dL Final     Recent Labs     05/16/20  0713   PH 7.423   PO2 90.7   PCO2 52.9*   HCO3 33.8*   BE 8.2*   O2SAT 96.4       RADIOLOGY:  XR CHEST PORTABLE   Final Result   Probable interval worsening diffuse airspace and interstitial abnormalities,   especially right upper lung with moderate cardiomegaly and probable right   pleural effusion. Findings likely represent known pneumonia with underlying   CHF/edema and possible ARDS. XR CHEST PORTABLE   Final Result   1. No significant change in diffuse airspace interstitial opacities   bilaterally likely due to edema (cardiogenic or noncardiogenic) and/or   pneumonia. 2. Moderate cardiomegaly, small to moderate right pleural effusion, and   questionable trace left pleural effusion, findings that can be seen with   congestive heart failure. 3. Unchanged right basilar passive atelectasis. Superimposed pneumonia is   not excluded. CT CHEST WO CONTRAST   Final Result   Cardiomegaly with asymmetric edema versus multifocal infection. Small right pleural effusion.       Indeterminate mediastinal

## 2020-05-16 NOTE — PROGRESS NOTES
Temp 96.6 °F (35.9 °C) (Oral)   Resp 28   Ht 5' 5\" (1.651 m)   Wt 257 lb 12.8 oz (116.9 kg)   SpO2 95%   BMI 42.90 kg/m²   Temp  Av.8 °F (36.6 °C)  Min: 95.8 °F (35.4 °C)  Max: 99.2 °F (37.3 °C)  Constitutional:  The patient is awake, alert,  Heentat/nc  rs dec bs ant no wheeze  -cxry Probable interval worsening diffuse airspace and interstitial abnormalities,  especially right upper lung with moderate cardiomegaly and probable right  pleural effusion.  Findings likely represent known pneumonia with underlying  CHF/edema and possible ARDS.   cvs s1/s2  abd soft nt  Ext edema  Sacral ulcer  CNS    AAxO   Lines: pIV    Radiology:  Laboratory and Tests Review:  Lab Results   Component Value Date    WBC 2.7 (L) 2020    WBC 6.6 05/15/2020    WBC 9.3 2020    HGB 8.8 (L) 2020    HCT 29.1 (L) 2020    MCV 91.2 2020     2020     No results found for: CRPHS  Lab Results   Component Value Date    ALT 31 2020    AST 49 (H) 2020    ALKPHOS 192 (H) 2020    BILITOT 0.5 2020     Lab Results   Component Value Date     2020    K 3.7 2020    K 4.4 2020    CL 97 2020    CO2 29 2020    BUN 33 2020    CREATININE 0.8 2020    CREATININE 0.8 05/15/2020    CREATININE 1.1 2020    GFRAA >60 2020    LABGLOM >60 2020    GLUCOSE 153 2020    GLUCOSE 208 2012    PROT 6.7 2020    LABALBU 2.2 2020    LABALBU 3.7 2012    CALCIUM 8.0 2020    BILITOT 0.5 2020    ALKPHOS 192 2020    AST 49 2020    ALT 31 2020     Lab Results   Component Value Date    CRP 13.2 (H) 2020    CRP 11.9 (H) 05/15/2020    CRP 6.2 (H) 2020     Lab Results   Component Value Date    SEDRATE 83 (H) 10/19/2018       Microbiology:   Recent Labs     [address] DETECTED*     Recent Labs     20  0609 20  0900 05/15/20  0853 20  0630   CRP  --

## 2020-05-17 NOTE — PROGRESS NOTES
Pulmonary/Critical Care Progress Note    We are following patient for acute respiratory failure, COVID-19 pneumonitis, obesity (BMI 43), pulmonary hypertension, obstructive sleep apnea, chronic atrial fibrillation on apixaban, dementia    SUBJECTIVE:  Patient was transferred to the intensive care unit yesterday for progressive impairment of oxygenation. Her chest x-ray shows extensive bilateral infiltrates but she is relatively comfortable on 6 to 8 L nasal cannula and does not appear as though she requires intubation at this time. She also is receiving intravenous vancomycin cefepime, and doxycycline per the infectious disease service. She will need aerosolized bronchodilators as well and is currently receiving Lasix 20 mg IV twice daily. She is taking a full liquid diet.     MEDICATIONS:   furosemide  20 mg Intravenous BID    folic acid  1 mg Oral Daily    ipratropium-albuterol  1 ampule Inhalation Q4H    lactobacillus  1 capsule Oral Daily    insulin glargine  18 Units Subcutaneous Nightly    potassium bicarb-citric acid  20 mEq Oral Daily    vancomycin  1,500 mg Intravenous Q18H    miconazole   Topical BID    acetaminophen  650 mg Oral Once    sodium chloride flush  10 mL Intravenous 2 times per day    cefepime  1 g Intravenous Q8H    doxycycline (VIBRAMYCIN) IV  100 mg Intravenous Q12H    lidocaine  5 mL Intradermal Once    heparin flush  3 mL Intravenous 2 times per day    zinc gluconate  50 mg Oral Daily    ascorbic acid  1,500 mg Intravenous Q6H    thiamine (VITAMIN B1) IVPB  200 mg Intravenous Q12H    allopurinol  100 mg Oral BID    apixaban  5 mg Oral BID    calcium-vitamin D  1 tablet Oral Daily    gabapentin  300 mg Oral TID    levothyroxine  200 mcg Oral QAM AC    pantoprazole  40 mg Oral QAM AC    sertraline  25 mg Oral Nightly    hydroxychloroquine  200 mg Oral Q12H    insulin lispro  0-6 Units Subcutaneous TID WC    insulin lispro  0-3 Units Subcutaneous Nightly    05/15/2020 0.8 0.5 - 1.0 mg/dL Final     Glucose   Date Value Ref Range Status   05/17/2020 109 (H) 74 - 99 mg/dL Final   05/16/2020 153 (H) 74 - 99 mg/dL Final   05/15/2020 174 (H) 74 - 99 mg/dL Final   04/17/2012 208 (H) 70 - 110 mg/dL Final   01/10/2012 150 (H) 70 - 110 mg/dL Final   06/14/2011 162 (H) 70 - 110 mg/dL Final     Calcium   Date Value Ref Range Status   05/17/2020 8.3 (L) 8.6 - 10.2 mg/dL Final   05/16/2020 8.0 (L) 8.6 - 10.2 mg/dL Final   05/15/2020 8.3 (L) 8.6 - 10.2 mg/dL Final     Total Protein   Date Value Ref Range Status   05/17/2020 6.8 6.4 - 8.3 g/dL Final   05/16/2020 6.7 6.4 - 8.3 g/dL Final   05/14/2020 7.4 6.4 - 8.3 g/dL Final     Albumin   Date Value Ref Range Status   04/17/2012 3.7 3.2 - 4.8 g/dL Final   01/10/2012 3.9 3.2 - 4.8 g/dL Final   06/14/2011 4.0 3.2 - 4.8 g/dL Final     Alb   Date Value Ref Range Status   05/17/2020 2.6 (L) 3.5 - 5.2 g/dL Final   05/16/2020 2.2 (L) 3.5 - 5.2 g/dL Final   05/14/2020 2.6 (L) 3.5 - 5.2 g/dL Final     Total Bilirubin   Date Value Ref Range Status   05/17/2020 0.4 0.0 - 1.2 mg/dL Final   05/16/2020 0.5 0.0 - 1.2 mg/dL Final   05/14/2020 0.5 0.0 - 1.2 mg/dL Final     Alkaline Phosphatase   Date Value Ref Range Status   05/17/2020 199 (H) 35 - 104 U/L Final   05/16/2020 192 (H) 35 - 104 U/L Final   05/14/2020 189 (H) 35 - 104 U/L Final     AST   Date Value Ref Range Status   05/17/2020 47 (H) 0 - 31 U/L Final   05/16/2020 49 (H) 0 - 31 U/L Final   05/14/2020 79 (H) 0 - 31 U/L Final     Comment:     Specimen is slightly Hemolyzed. Result may be artificially increased. ALT   Date Value Ref Range Status   05/17/2020 30 0 - 32 U/L Final   05/16/2020 31 0 - 32 U/L Final   05/14/2020 43 (H) 0 - 32 U/L Final     GFR Non-   Date Value Ref Range Status   05/17/2020 >60 >=60 mL/min/1.73 Final     Comment:     Chronic Kidney Disease: less than 60 ml/min/1.73 sq.m. Kidney Failure: less than 15 ml/min/1.73 sq.m.   Results valid for patients 18 years and older. 05/16/2020 >60 >=60 mL/min/1.73 Final     Comment:     Chronic Kidney Disease: less than 60 ml/min/1.73 sq.m. Kidney Failure: less than 15 ml/min/1.73 sq.m. Results valid for patients 18 years and older. 05/15/2020 >60 >=60 mL/min/1.73 Final     Comment:     Chronic Kidney Disease: less than 60 ml/min/1.73 sq.m. Kidney Failure: less than 15 ml/min/1.73 sq.m. Results valid for patients 18 years and older. GFR    Date Value Ref Range Status   05/17/2020 >60  Final   05/16/2020 >60  Final   05/15/2020 >60  Final     Magnesium   Date Value Ref Range Status   05/17/2020 1.7 1.6 - 2.6 mg/dL Final   05/16/2020 1.8 1.6 - 2.6 mg/dL Final   05/15/2020 1.8 1.6 - 2.6 mg/dL Final     Phosphorus   Date Value Ref Range Status   05/17/2020 3.2 2.5 - 4.5 mg/dL Final   05/16/2020 3.1 2.5 - 4.5 mg/dL Final   05/15/2020 3.1 2.5 - 4.5 mg/dL Final     Recent Labs     05/16/20  0713   PH 7.423   PO2 90.7   PCO2 52.9*   HCO3 33.8*   BE 8.2*   O2SAT 96.4       RADIOLOGY:  XR CHEST PORTABLE   Final Result   Further likely interval worsening diffuse airspace and interstitial   abnormalities, as above. XR CHEST PORTABLE   Final Result   Probable interval worsening diffuse airspace and interstitial abnormalities,   especially right upper lung with moderate cardiomegaly and probable right   pleural effusion. Findings likely represent known pneumonia with underlying   CHF/edema and possible ARDS. XR CHEST PORTABLE   Final Result   1. No significant change in diffuse airspace interstitial opacities   bilaterally likely due to edema (cardiogenic or noncardiogenic) and/or   pneumonia. 2. Moderate cardiomegaly, small to moderate right pleural effusion, and   questionable trace left pleural effusion, findings that can be seen with   congestive heart failure. 3. Unchanged right basilar passive atelectasis. Superimposed pneumonia is   not excluded.

## 2020-05-17 NOTE — PLAN OF CARE
Problem: Skin Integrity:  Goal: Absence of new skin breakdown  Description: Absence of new skin breakdown  Outcome: Met This Shift     Problem: OXYGENATION/RESPIRATORY FUNCTION  Goal: Patient will maintain patent airway  Outcome: Met This Shift     Problem: Skin Integrity:  Goal: Will show no infection signs and symptoms  Description: Will show no infection signs and symptoms  Outcome: Not Met This Shift     Problem: OXYGENATION/RESPIRATORY FUNCTION  Goal: Patient will achieve/maintain normal respiratory rate/effort  Description: Respiratory rate and effort will be within normal limits for the patient  Outcome: Not Met This Shift

## 2020-05-17 NOTE — PROGRESS NOTES
fair +wheeled walker      Patient is Alert & Oriented x person, place, time and situation  and follows directions    Sensation:  Pt denies numbness and tingling to extremities  Edema:  yes bilateral lower extremities and right upper extremity    Vitals:  SPO2 at rest 92% on 6L SPO2 post activity 85% with cues to breathe and count reps to maintain 88-92%     Patient education  Patient educated on purse lipped breathing, energy conservation and safety      Patient response to education:   Pt verbalized understanding Pt demonstrated skill Pt requires further education in this area   Yes Partial Yes       ASSESSMENT:    Comments:  Pt needing consistent cueing throughout tx session for pursed lip breathing. Pt tends to hold her breath especially with any type of activity. Pt's pulse ox dropped to low to high 80's at times and pt needed reminded to breathe    Pt placed in chair position for exercises per nursing request      Therapeutic Exercises: ankle pumps, heel slide and long arc quad    Shoulder flexion, bicep curls and wrist circles x 10 reps bilateral ; pt required verbal cues to stop activity and recover po2 between each activity    At end of session, patient in bed with alarm call light and phone within reach,  all lines and tubes intact, nursing notified. Patient would benefit from continued skilled Physical Therapy to improve functional independence and quality of life. PLAN:    Patient is making limited progress towards established goals. Will continue with current Plan of care.       Time in  1105  Time out 1035    Total Treatment Time  30 minutes    CPT codes:  Therapeutic exercises (31462)   30 minutes  2 unit(s)

## 2020-05-18 NOTE — PROGRESS NOTES
Stage 4    SUBJECTIVE:    Social history: Patient lives in a skilled nursing facility     Equipment owned: Wheelchair and Neheimah Ocampo,       [id number] [facility]   How much difficulty turning over in bed?: Unable  How much difficulty sitting down on / standing up from a chair with arms?: Unable  How much difficulty moving from lying on back to sitting on side of bed?: Unable  How much help from another person moving to and from a bed to a chair?: Total  How much help from another person needed to walk in hospital room?: Total  How much help from another person for climbing 3-5 steps with a railing?: Total  AM-PAC Inpatient Mobility Raw Score : 6  AM-PAC Inpatient T-Scale Score : 23.55  Mobility Inpatient CMS 0-100% Score: 100  Mobility Inpatient CMS G-Code Modifier : CN    Nursing cleared patient for PT treatment. OBJECTIVE:   Initial Evaluation  Date: 5/14/2020 Treatment Date:  5/18/2020       Short Term/ Long Term   Goals   Was pt agreeable to Eval/treatment? Yes   yes To be met in 5 days   Pain level   4/10  buttocks No number given for pain with her buttocks    Bed Mobility    Rolling: Maximal assist of 1    Supine to sit: Maximal assist of  2    Sit to supine: Maximal assist of  2    Scooting: Maximal assist of  2   Rolling: Not assessed    Supine to sit: Not assessed    Sit to supine: Not assessed    Scooting: Not assessed     Rolling: Moderate assist of 1    Supine to sit: Moderate assist of 1    Sit to supine: Moderate assist of 1    Scooting: Moderate assist of 1     Transfers Sit to stand: Maximal assist of  2 chux Sit to stand: Not assessed        Sit to stand:  Moderate assist of 1 wheeled walker    Ambulation    3 side steps using  hand held assist with Moderate assist of  2     not assessed     20 feet using  wheeled walker with Moderate assist of 1    Stair negotiation: ascended and descended   Not assessed            ROM Limited shldr elevation    Increase range of motion 10% of affected joints    Strength BUE: 4-/5  RLE:  3+/5  LLE:  3+/5   Increase strength in affected mm groups by 1/3 grade   Balance Sitting EOB:  fair    Dynamic Standing:  fair hand held assist x 2     Sitting EOB:  good    Dynamic Standing: fair +wheeled walker      Patient is Alert & Oriented x person, place, time and situation  and follows directions    Sensation:  Pt denies numbness and tingling to extremities  Edema:  yes bilateral lower extremities and right upper extremity    Vitals:  13 Liters of o2 via nasal cannula   SPO2 at rest 90%   SPO2 post activity to chair position 88%     Patient education  Patient educated on purse lipped breathing, energy conservation and safety      Patient response to education:   Pt verbalized understanding Pt demonstrated skill Pt requires further education in this area   Yes Partial Yes       ASSESSMENT:    Comments:  Pt needing consistent cueing throughout tx session for pursed lip breathing. Pt tends to hold her breath   with any type of activity. Therapeutic Exercises: not performed     Unable to tolerate increased activity d/t low po2; therapy time used for education and verbal cuing to pursed lip breath, relax shoulders   At end of session, patient in bed in chair position with alarm call light and phone within reach,  all lines and tubes intact, nursing notified. Nursing ok with chair position    Patient would benefit from continued skilled Physical Therapy to improve functional independence and quality of life. PLAN:    Patient is making limited progress towards established goals. Will continue with current Plan of care.       Time in  1210   Time out  1238    Total Treatment Time 28 minutes    CPT codes:  Therapeutic activities (03394)   28 minutes  22 unit(s)

## 2020-05-18 NOTE — PROGRESS NOTES
SpO2 92%   BMI 42.90 kg/m²       Due to the current efforts to prevent transmission of COVID-19 and also the need to preserve PPE for other caregivers, a face-to-face encounter with the patient was not performed. That being said, all relevant records and diagnostic tests were reviewed, including laboratory results and imaging. Please reference any relevant documentation elsewhere. Care will be coordinated with pulmonary/critical care. Recent Labs     05/16/20 0630 05/17/20 0552 05/18/20  0541    142 144   K 3.7 3.5 3.7   CL 97* 97* 99   CO2 29 31* 33*   BUN 33* 28* 23   CREATININE 0.8 0.8 0.8   GLUCOSE 153* 109* 123*   CALCIUM 8.0* 8.3* 8.2*       Recent Labs     05/16/20 0630 05/17/20 0552 05/18/20  0541   ALKPHOS 192* 199* 185*   PROT 6.7 6.8 6.9   LABALBU 2.2* 2.6* 2.4*   BILITOT 0.5 0.4 0.4   AST 49* 47* 43*   ALT 31 30 26       Recent Labs     05/16/20 0630 05/17/20 0525 05/18/20  0541   WBC 2.7* 3.2* 3.5*   RBC 3.19* 3.40* 3.52   HGB 8.8* 9.2* 9.6*   HCT 29.1* 31.0*  30.5* 32.0*   MCV 91.2 89.7 90.9   MCH 27.6 27.1 27.3   MCHC 30.2* 30.2* 30.0*   RDW 18.2* 18.3* 18.2*    219 225   MPV 11.1 11.1 10.4       COVID-19/ERNESTINE-COV2 LABS  Recent Labs     05/18/20  0542   COVID19 DETECTED*     Recent Labs     05/16/20 0630 05/17/20 0525 05/17/20 0552 05/18/20  0541 05/18/20  0542   CRP 13.2* 7.9*  --   --  4.4*   PROCAL 0.32*  --   --   --   --    FERRITIN 616 798  --   --   --    LDH  --  279*  --   --   --    DDIMER 1024 1847  --   --  1625   FIBRINOGEN 566* 505  --   --  469   AST 49*  --  47* 43*  --    ALT 31  --  30 26  --    TRIG 27  --   --   --   --      Lab Results   Component Value Date    CHOL 42 05/16/2020    TRIG 27 05/16/2020    HDL 26 05/16/2020    LDLCALC 11 05/16/2020    LABVLDL 5 05/16/2020           Radiology:   XR CHEST PORTABLE   Final Result   Further likely interval worsening diffuse airspace and interstitial   abnormalities, as above.          XR CHEST PORTABLE Final Result   Probable interval worsening diffuse airspace and interstitial abnormalities,   especially right upper lung with moderate cardiomegaly and probable right   pleural effusion. Findings likely represent known pneumonia with underlying   CHF/edema and possible ARDS. XR CHEST PORTABLE   Final Result   1. No significant change in diffuse airspace interstitial opacities   bilaterally likely due to edema (cardiogenic or noncardiogenic) and/or   pneumonia. 2. Moderate cardiomegaly, small to moderate right pleural effusion, and   questionable trace left pleural effusion, findings that can be seen with   congestive heart failure. 3. Unchanged right basilar passive atelectasis. Superimposed pneumonia is   not excluded. CT CHEST WO CONTRAST   Final Result   Cardiomegaly with asymmetric edema versus multifocal infection. Small right pleural effusion. Indeterminate mediastinal lymphadenopathy. Cardiomegaly. XR CHEST PORTABLE   Final Result   Stable chest with no change in diffuse bilateral interstitial and airspace   disease plus or minus right pleural effusion. Findings may reflect edema or   multifocal infection superimposed on chronic interstitial lung changes. XR CHEST PORTABLE   Final Result   Findings favoring pulmonary edema with small-moderate right pleural effusion,   cardiomegaly and pulmonary vascular indistinctness.              Assessment:  Active Problems:    Acute hypoxemic respiratory failure present on admission  Multilobar pneumonia  Moderate tricuspid regurgitation  Mild to moderate pulmonary hypertension  Acute congestive heart failure exacerbation with systolic and diastolic dysfunction  Mild to moderate right pleural effusion  Possible obstructive sleep apnea  Stage IV sacral decubitus ulcer present on admission  Positive COVID-19 infection  Morbid obesity BMI of 36  Functional quadriplegia due to diabetic polyneuropathy  Uncontrolled

## 2020-05-18 NOTE — PROGRESS NOTES
lb 12.8 oz (116.9 kg)   SpO2 (!) 89%   BMI 42.90 kg/m²   Temp  Av.5 °F (36.4 °C)  Min: 96.2 °F (35.7 °C)  Max: 98 °F (36.7 °C)  Constitutional:  The patient is awake, alert,     CNS    Awake   Lines: pIV  Macdonald     Radiology:  Laboratory and Tests Review:  Lab Results   Component Value Date    WBC 3.5 (L) 2020    WBC 3.2 (L) 2020    WBC 2.7 (L) 2020    HGB 9.6 (L) 2020    HCT 32.0 (L) 2020    MCV 90.9 2020     2020     No results found for: RUST  Lab Results   Component Value Date    ALT 26 2020    AST 43 (H) 2020    ALKPHOS 185 (H) 2020    BILITOT 0.4 2020     Lab Results   Component Value Date     2020    K 3.7 2020    K 4.4 2020    CL 99 2020    CO2 33 2020    BUN 23 2020    CREATININE 0.8 2020    CREATININE 0.8 2020    CREATININE 0.8 2020    GFRAA >60 2020    LABGLOM >60 2020    GLUCOSE 123 2020    GLUCOSE 208 2012    PROT 6.9 2020    LABALBU 2.4 2020    LABALBU 3.7 2012    CALCIUM 8.2 2020    BILITOT 0.4 2020    ALKPHOS 185 2020    AST 43 2020    ALT 26 2020     Lab Results   Component Value Date    CRP 4.4 (H) 2020    CRP 7.9 (H) 2020    CRP 13.2 (H) 2020     Lab Results   Component Value Date    SEDRATE 83 (H) 10/19/2018       Microbiology:   Recent Labs     20  0542   COVID19 DETECTED*     Recent Labs     20  0630 20  0525 20  0552 20  0541 20  0542   CRP 13.2* 7.9*  --   --  4.4*   PROCAL 0.32*  --   --   --   --    FERRITIN 616 798  --   --   --    LDH  --  279*  --   --   --    DDIMER 1024 5317  --   --  1625   FIBRINOGEN 566* 505  --   --  469   AST 49*  --  47* 43*  --    ALT 31  --  30 26  --    TRIG 27  --   --   --   --      Lab Results   Component Value Date    CHOL 42 2020    TRIG 27 2020    HDL 26 2020    LDLCALC 11

## 2020-05-18 NOTE — PLAN OF CARE
Problem: Falls - Risk of:  Goal: Will remain free from falls  Description: Will remain free from falls  Outcome: Met This Shift     Problem: Falls - Risk of:  Goal: Absence of physical injury  Description: Absence of physical injury  Outcome: Met This Shift     Problem: Skin Integrity:  Goal: Will show no infection signs and symptoms  Description: Will show no infection signs and symptoms  Outcome: Met This Shift     Problem: Skin Integrity:  Goal: Absence of new skin breakdown  Description: Absence of new skin breakdown  Outcome: Met This Shift     Problem: OXYGENATION/RESPIRATORY FUNCTION  Goal: Patient will maintain patent airway  Outcome: Met This Shift

## 2020-05-18 NOTE — PROGRESS NOTES
Pulmonary/Critical Care Progress Note    We are following patient for acute respiratory failure, COVID-19 pneumonitis, obesity, pulmonary hypertension, obstructive sleep apnea, chronic atrial fibrillation on apixaban, dementia    SUBJECTIVE:  Jocelynn Iglesias continues to be stable although she is still requiring high flow rates on the order of 10 to 12 L/min via nasal cannula. Nevertheless, she does still seem comfortable and is tolerating intravenous vancomycin intravenous doxycycline, intravenous cefepime some intravenous fluids and aerosolized bronchodilators along with furosemide 20 mg twice daily. So far, she is given no signs of needing intubation although she said that she wants it if necessary although this bears rechecking since we have not discussed this issue several days.     MEDICATIONS:   furosemide  20 mg Intravenous BID    folic acid  1 mg Oral Daily    ipratropium-albuterol  1 ampule Inhalation Q4H    lactobacillus  1 capsule Oral Daily    insulin glargine  18 Units Subcutaneous Nightly    potassium bicarb-citric acid  20 mEq Oral Daily    vancomycin  1,500 mg Intravenous Q18H    miconazole   Topical BID    acetaminophen  650 mg Oral Once    sodium chloride flush  10 mL Intravenous 2 times per day    cefepime  1 g Intravenous Q8H    doxycycline (VIBRAMYCIN) IV  100 mg Intravenous Q12H    lidocaine  5 mL Intradermal Once    heparin flush  3 mL Intravenous 2 times per day    zinc gluconate  50 mg Oral Daily    ascorbic acid  1,500 mg Intravenous Q6H    allopurinol  100 mg Oral BID    apixaban  5 mg Oral BID    calcium-vitamin D  1 tablet Oral Daily    gabapentin  300 mg Oral TID    levothyroxine  200 mcg Oral QAM AC    pantoprazole  40 mg Oral QAM AC    sertraline  25 mg Oral Nightly    insulin lispro  0-6 Units Subcutaneous TID WC    insulin lispro  0-3 Units Subcutaneous Nightly    atorvastatin  10 mg Oral Nightly    insulin glargine  10 Units Subcutaneous Once      sodium chloride 11.5 E9/L Final   05/16/2020 2.7 (L) 4.5 - 11.5 E9/L Final     Hemoglobin   Date Value Ref Range Status   05/18/2020 9.6 (L) 11.5 - 15.5 g/dL Final   05/17/2020 9.2 (L) 11.5 - 15.5 g/dL Final   05/16/2020 8.8 (L) 11.5 - 15.5 g/dL Final     Hematocrit   Date Value Ref Range Status   05/18/2020 32.0 (L) 34.0 - 48.0 % Final   05/17/2020 30.5 (L) 34.0 - 48.0 % Final   05/17/2020 31.0 (L) 34.0 - 48.0 % Final     MCV   Date Value Ref Range Status   05/18/2020 90.9 80.0 - 99.9 fL Final   05/17/2020 89.7 80.0 - 99.9 fL Final   05/16/2020 91.2 80.0 - 99.9 fL Final     Platelets   Date Value Ref Range Status   05/18/2020 225 130 - 450 E9/L Final   05/17/2020 219 130 - 450 E9/L Final   05/16/2020 216 130 - 450 E9/L Final     Sodium   Date Value Ref Range Status   05/18/2020 144 132 - 146 mmol/L Final   05/17/2020 142 132 - 146 mmol/L Final   05/16/2020 136 132 - 146 mmol/L Final     Potassium   Date Value Ref Range Status   05/18/2020 3.7 3.5 - 5.0 mmol/L Final   05/17/2020 3.5 3.5 - 5.0 mmol/L Final   05/16/2020 3.7 3.5 - 5.0 mmol/L Final     Potassium reflex Magnesium   Date Value Ref Range Status   05/14/2020 4.4 3.5 - 5.0 mmol/L Final   10/19/2018 4.4 3.5 - 5.0 mmol/L Final     Chloride   Date Value Ref Range Status   05/18/2020 99 98 - 107 mmol/L Final   05/17/2020 97 (L) 98 - 107 mmol/L Final   05/16/2020 97 (L) 98 - 107 mmol/L Final     CO2   Date Value Ref Range Status   05/18/2020 33 (H) 22 - 29 mmol/L Final   05/17/2020 31 (H) 22 - 29 mmol/L Final   05/16/2020 29 22 - 29 mmol/L Final     BUN   Date Value Ref Range Status   05/18/2020 23 8 - 23 mg/dL Final   05/17/2020 28 (H) 8 - 23 mg/dL Final   05/16/2020 33 (H) 8 - 23 mg/dL Final     CREATININE   Date Value Ref Range Status   05/18/2020 0.8 0.5 - 1.0 mg/dL Final   05/17/2020 0.8 0.5 - 1.0 mg/dL Final   05/16/2020 0.8 0.5 - 1.0 mg/dL Final     Glucose   Date Value Ref Range Status   05/18/2020 123 (H) 74 - 99 mg/dL Final   05/17/2020 109 (H) 74 - 99 mg/dL Final 05/16/2020 >60 >=60 mL/min/1.73 Final     Comment:     Chronic Kidney Disease: less than 60 ml/min/1.73 sq.m. Kidney Failure: less than 15 ml/min/1.73 sq.m. Results valid for patients 18 years and older. GFR    Date Value Ref Range Status   05/18/2020 >60  Final   05/17/2020 >60  Final   05/16/2020 >60  Final     Magnesium   Date Value Ref Range Status   05/18/2020 1.5 (L) 1.6 - 2.6 mg/dL Final   05/17/2020 1.7 1.6 - 2.6 mg/dL Final   05/16/2020 1.8 1.6 - 2.6 mg/dL Final     Phosphorus   Date Value Ref Range Status   05/18/2020 3.0 2.5 - 4.5 mg/dL Final   05/17/2020 3.2 2.5 - 4.5 mg/dL Final   05/16/2020 3.1 2.5 - 4.5 mg/dL Final     Recent Labs     05/18/20  0632   PH 7.469*   PO2 67.3*   PCO2 52.6*   HCO3 37.3*   BE 11.9*   O2SAT 93.0       RADIOLOGY:  XR CHEST PORTABLE   Final Result   Further likely interval worsening diffuse airspace and interstitial   abnormalities, as above. XR CHEST PORTABLE   Final Result   Probable interval worsening diffuse airspace and interstitial abnormalities,   especially right upper lung with moderate cardiomegaly and probable right   pleural effusion. Findings likely represent known pneumonia with underlying   CHF/edema and possible ARDS. XR CHEST PORTABLE   Final Result   1. No significant change in diffuse airspace interstitial opacities   bilaterally likely due to edema (cardiogenic or noncardiogenic) and/or   pneumonia. 2. Moderate cardiomegaly, small to moderate right pleural effusion, and   questionable trace left pleural effusion, findings that can be seen with   congestive heart failure. 3. Unchanged right basilar passive atelectasis. Superimposed pneumonia is   not excluded. CT CHEST WO CONTRAST   Final Result   Cardiomegaly with asymmetric edema versus multifocal infection. Small right pleural effusion. Indeterminate mediastinal lymphadenopathy. Cardiomegaly.          XR CHEST PORTABLE   Final

## 2020-05-18 NOTE — DISCHARGE INSTR - COC
L89.159    Cardiomegaly A36.6    Acute systolic CHF (congestive heart failure) ([facility]) I50.21    Atrial fibrillation (HCC) I48.91    DM type 2 (diabetes mellitus, type 2) (HCC) E11.9    Morbid obesity (HCC) E66.01    Type 2 diabetes mellitus, without long-term current use of insulin (HCC) E11.9    Acute hypoxemic respiratory failure ([facility]) J96.01    Pneumonia J18.9    History of 2019 novel coronavirus disease (COVID-19) Z86.19       Isolation/Infection:   Isolation          Droplet Plus        Patient Infection Status     Infection Onset Added Last Indicated Last Indicated By Review Planned Expiration Resolved Resolved By    COVID-19 05/11/20 05/12/20 05/18/20 COVID-19  07/13/20      Resolved    COVID-19 Rule Out 05/11/20 05/11/20 05/11/20 Covid-19 Ambulatory (Ordered)   05/12/20 Rule-Out Test Resulted          Nurse Assessment:  Last Vital Signs: /68   Pulse 96   Temp 97.9 °F (36.6 °C) (Axillary)   Resp 23   Ht 5' 5\" (1.651 m)   Wt 257 lb 12.8 oz (116.9 kg)   SpO2 91%   BMI 42.90 kg/m²     Last documented pain score (0-10 scale): Pain Level: 5  Last Weight:   Wt Readings from Last 1 Encounters:   05/16/20 257 lb 12.8 oz (116.9 kg)     Mental Status:  {IP PT MENTAL STATUS:81836}    IV Access:  { ROSANNE IV ACCESS:991243446}    Nursing Mobility/ADLs:  Walking   {P DME OQVO:989242847}  Transfer  {P DME SQRF:737029391}  Bathing  {CHP DME RRSD:943088441}  Dressing  {P DME GDWZ:762430466}  Toileting  {P DME DKLU:108497307}  Feeding  {CHP DME YHFV:339826522}  Med Admin  {CHP DME SQQR:436873283}  Med Delivery   { ROSANNE MED Delivery:072398321}    Wound Care Documentation and Therapy:  Wound 01/11/19 Sacrum Mid #5 aquired 1/17/13 (Active)   Dressing Status Clean;Dry; Intact 5/18/2020  4:00 AM   Dressing Changed Changed/New 5/17/2020  4:00 PM   Dressing/Treatment Moist to dry; Foam 5/17/2020  4:00 PM   Wound Cleansed Rinsed/Irrigated with saline 5/17/2020  4:00 PM   Dressing Change Due 05/18/20 of the diagnosis listed and that she requires [place] for greater 30 days.      Update Admission H&P: {CHP DME Changes in AOQEX:408870272}    PHYSICIAN SIGNATURE:  {Esignature:351275955}

## 2020-05-19 PROBLEM — J80 ACUTE RESPIRATORY DISTRESS SYNDROME (ARDS) DUE TO COVID-19 VIRUS (HCC): Status: ACTIVE | Noted: 2020-01-01

## 2020-05-19 PROBLEM — U07.1 ACUTE RESPIRATORY DISTRESS SYNDROME (ARDS) DUE TO COVID-19 VIRUS (HCC): Status: ACTIVE | Noted: 2020-01-01

## 2020-05-19 PROBLEM — J81.0 ACUTE PULMONARY EDEMA (HCC): Status: ACTIVE | Noted: 2020-01-01

## 2020-05-19 PROBLEM — Z98.890 REQUIRED EMERGENT INTUBATION: Status: ACTIVE | Noted: 2020-01-01

## 2020-05-19 NOTE — PROGRESS NOTES
While turning during bath patient became very short of breathing and began to desaturate and heart rate and BP increased. RRT was called respiratory, nursing and Dr Aba Friend at bedside. Decision made to intubate patient.

## 2020-05-19 NOTE — PROGRESS NOTES
[address] [place] [address] [facility]  [facility]  Progress Note    NAME:Genet Collins  1935  DATE:05/19/20    Pt was seen  FOR COVID    SUBJECTIVE:  Chief Complaint   Patient presents with    Shortness of Breath     also headache, positive for COVID-19   PT IN BED/icu  On vent now   Tmax99.9  Spoke with nurse  Afebrile. Review of systems:  As stated above in the chief complaint, otherwise negative.     Medications:  Scheduled Meds:   succinylcholine        midazolam  5 mg Intravenous Once    lidocaine  5 mL Intradermal Once    vancomycin  1,500 mg Intravenous Q18H    doxycycline hyclate  100 mg Oral 2 times per day    furosemide  20 mg Intravenous BID    folic acid  1 mg Oral Daily    ipratropium-albuterol  1 ampule Inhalation Q4H    lactobacillus  1 capsule Oral Daily    insulin glargine  18 Units Subcutaneous Nightly    potassium bicarb-citric acid  20 mEq Oral Daily    miconazole   Topical BID    acetaminophen  650 mg Oral Once    sodium chloride flush  10 mL Intravenous 2 times per day    cefepime  1 g Intravenous Q8H    lidocaine  5 mL Intradermal Once    heparin flush  3 mL Intravenous 2 times per day    zinc gluconate  50 mg Oral Daily    allopurinol  100 mg Oral BID    apixaban  5 mg Oral BID    calcium-vitamin D  1 tablet Oral Daily    gabapentin  300 mg Oral TID    levothyroxine  200 mcg Oral QAM AC    pantoprazole  40 mg Oral QAM AC    sertraline  25 mg Oral Nightly    insulin lispro  0-6 Units Subcutaneous TID WC    insulin lispro  0-3 Units Subcutaneous Nightly    atorvastatin  10 mg Oral Nightly    insulin glargine  10 Units Subcutaneous Once     Continuous Infusions:   propofol 20 mcg/kg/min (05/19/20 1124)    fentaNYL 5 mcg/ml in 0.9%  ml infusion 25 mcg/hr (05/19/20 0800)    norepinephrine      sodium chloride Stopped (05/18/20 2036)    dextrose       PRN Meds:sodium chloride flush, sodium chloride flush, heparin flush, acetaminophen **OR** acetaminophen, glucose, dextrose, glucagon (rDNA), dextrose    OBJECTIVE:  BP 98/61   Pulse 57   Temp 99 °F (37.2 °C) (Axillary)   Resp (!) 2   Ht 5' 5\" (1.651 m)   Wt 257 lb 12.8 oz (116.9 kg)   SpO2 100%   BMI 42.90 kg/m²   Temp  Av.4 °F (37.4 °C)  Min: 98.8 °F (37.1 °C)  Max: 99.9 °F (37.7 °C)  Constitutional:  The patient is on vent     CNS    Seated   rs vent  abd ngt  Lines: pIV  Macdonald     Radiology:  Laboratory and Tests Review:  Lab Results   Component Value Date    WBC 4.9 2020    WBC 3.5 (L) 2020    WBC 3.2 (L) 2020    HGB 11.5 2020    HCT 37.9 2020    MCV 88.6 2020     2020     No results found for: CRPHS  Lab Results   Component Value Date    ALT 26 2020    AST 53 (H) 2020    ALKPHOS 211 (H) 2020    BILITOT 0.6 2020     Lab Results   Component Value Date     2020    K 4.0 2020    K 4.4 2020    CL 95 2020    CO2 31 2020    BUN 19 2020    CREATININE 0.8 2020    CREATININE 0.8 2020    CREATININE 0.8 2020    GFRAA >60 2020    LABGLOM >60 2020    GLUCOSE 160 2020    GLUCOSE 208 2012    PROT 8.0 2020    LABALBU 2.7 2020    LABALBU 3.7 2012    CALCIUM 8.3 2020    BILITOT 0.6 2020    ALKPHOS 211 2020    AST 53 2020    ALT 26 2020     Lab Results   Component Value Date    CRP 3.4 (H) 2020    CRP 4.4 (H) 2020    CRP 7.9 (H) 2020     Lab Results   Component Value Date    SEDRATE 83 (H) 10/19/2018       Microbiology:   Recent Labs     20  0542   COVID19 DETECTED*     Recent Labs     20  0525 20  0552 20  0541 20  0542 20  0441   CRP 7.9*  --   --  4.4* 3.4*   PROCAL  --   --   --   --  0.14*   FERRITIN 798  --   --   --  1,165   *  --   --   --  477*   TROPONINI  --   --   --   --  0.03   DDIMER 1847  --   --  4943 4056   FIBRINOGEN 505  --

## 2020-05-19 NOTE — PROGRESS NOTES
Physical Therapy    Physical and OccupationalTherapy on hold d/t intubation; please reorder PT/OT EVAL AND TREAT when patient able to participate   Electronically signed by Kali Davis PT on 5/19/2020 at 1:30 PM

## 2020-05-19 NOTE — PROGRESS NOTES
20   Ht 5' 5\" (1.651 m)   Wt 257 lb 12.8 oz (116.9 kg)   SpO2 100%   BMI 42.90 kg/m²       Due to the current efforts to prevent transmission of COVID-19 and also the need to preserve PPE for other caregivers, a face-to-face encounter with the patient was not performed. That being said, all relevant records and diagnostic tests were reviewed, including laboratory results and imaging. Please reference any relevant documentation elsewhere. Care will be coordinated with pulmonary/critical care.         Recent Labs     05/17/20 0552 05/18/20 0541 05/19/20 0441    144 139   K 3.5 3.7 4.0   CL 97* 99 95*   CO2 31* 33* 31*   BUN 28* 23 19   CREATININE 0.8 0.8 0.8   GLUCOSE 109* 123* 160*   CALCIUM 8.3* 8.2* 8.3*       Recent Labs     05/17/20 0552 05/18/20 0541 05/19/20 0441   ALKPHOS 199* 185* 211*   PROT 6.8 6.9 8.0   LABALBU 2.6* 2.4* 2.7*   BILITOT 0.4 0.4 0.6   AST 47* 43* 53*   ALT 30 26 26       Recent Labs     05/17/20 0525 05/18/20 0541 05/19/20 0441   WBC 3.2* 3.5* 4.9   RBC 3.40* 3.52 4.28   HGB 9.2* 9.6* 11.5   HCT 31.0*  30.5* 32.0* 37.9   MCV 89.7 90.9 88.6   MCH 27.1 27.3 26.9   MCHC 30.2* 30.0* 30.3*   RDW 18.3* 18.2* 18.6*    225 236   MPV 11.1 10.4 10.4       COVID-19/ERNESTINE-COV2 LABS  Recent Labs     05/18/20 0542   COVID19 DETECTED*     Recent Labs     05/17/20 0525 05/17/20 0552 05/18/20 0541 05/18/20 0542 05/19/20 0441   CRP 7.9*  --   --  4.4* 3.4*   PROCAL  --   --   --   --  0.14*   FERRITIN 798  --   --   --  1,165   *  --   --   --  477*   TROPONINI  --   --   --   --  0.03   DDIMER 1847  --   --  1625 2635   FIBRINOGEN 505  --   --  469 445   AST  --  47* 43*  --  53*   ALT  --  30 26  --  26     Lab Results   Component Value Date    CHOL 42 05/16/2020    TRIG 27 05/16/2020    HDL 26 05/16/2020    LDLCALC 11 05/16/2020    LABVLDL 5 05/16/2020           Radiology:   XR CHEST PORTABLE   Final Result   Stable bilateral pulmonary infiltrates      Probable right pleural effusion      Endotracheal tube tip in good position         XR ABDOMEN FOR NG/OG/NE TUBE PLACEMENT   Final Result   Nasogastric tube tip overlies the region of the antrum of the stomach         XR CHEST PORTABLE   Final Result   Further likely interval worsening diffuse airspace and interstitial   abnormalities, as above. XR CHEST PORTABLE   Final Result   Probable interval worsening diffuse airspace and interstitial abnormalities,   especially right upper lung with moderate cardiomegaly and probable right   pleural effusion. Findings likely represent known pneumonia with underlying   CHF/edema and possible ARDS. XR CHEST PORTABLE   Final Result   1. No significant change in diffuse airspace interstitial opacities   bilaterally likely due to edema (cardiogenic or noncardiogenic) and/or   pneumonia. 2. Moderate cardiomegaly, small to moderate right pleural effusion, and   questionable trace left pleural effusion, findings that can be seen with   congestive heart failure. 3. Unchanged right basilar passive atelectasis. Superimposed pneumonia is   not excluded. CT CHEST WO CONTRAST   Final Result   Cardiomegaly with asymmetric edema versus multifocal infection. Small right pleural effusion. Indeterminate mediastinal lymphadenopathy. Cardiomegaly. XR CHEST PORTABLE   Final Result   Stable chest with no change in diffuse bilateral interstitial and airspace   disease plus or minus right pleural effusion. Findings may reflect edema or   multifocal infection superimposed on chronic interstitial lung changes. XR CHEST PORTABLE   Final Result   Findings favoring pulmonary edema with small-moderate right pleural effusion,   cardiomegaly and pulmonary vascular indistinctness.          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 Pneumonia due to COVID-19- transferred to ICU Saturday May 16th. ID following. Complete 5 days of hydroxychloroquine Monday morning. 5/18. Received tocilizumab evening on May 15th. She was getting high dose vitamin C and IV thiamine up until Monday but then changed over to orals. . Patient is also getting Zinc  2. Acute hypoxic respiratory failure--patient intubated this morning 5/19.  3. Acute on chronic diastolic heart failure--currently getting Lasix 20 mg IV q12.  4. Probable FELIX--Most likely has a sleep apnea recommend formal sleep study as an outpatient. 5. Type II DM with hyperglycemia--dose of Lantus increased over weekend from 14 to 18 units but prior to admission was taking 60 units in morning and 20 units in the evening. Check A1c.  6. Right pleural effusion--continue to monitor. Case discussed with Dr. Kalina Lopez of pulmonary/critical care in ICU. NOTE: This report was transcribed using voice recognition software. Every effort was made to ensure accuracy; however, inadvertent computerized transcription errors may be present.      Electronically signed by Shara Joe MD on 5/19/2020 at 10:34 AM

## 2020-05-19 NOTE — PROGRESS NOTES
Subcutaneous Nightly    atorvastatin  10 mg Oral Nightly    insulin glargine  10 Units Subcutaneous Once      propofol 30 mcg/kg/min (05/19/20 1510)    fentaNYL 5 mcg/ml in 0.9%  ml infusion 25 mcg/hr (05/19/20 1400)    norepinephrine      dextrose 5% in lactated ringers 100 mL/hr at 05/19/20 1311    DOPamine 5 mcg/kg/min (05/19/20 1452)    sodium chloride Stopped (05/19/20 1452)    dextrose       sodium chloride flush, sodium chloride flush, heparin flush, acetaminophen **OR** acetaminophen, glucose, dextrose, glucagon (rDNA), dextrose      REVIEW OF SYSTEMS:  Patient is sedated and intubated       OBJECTIVE:  Vitals:    05/19/20 1510   BP: (!) 153/102   Pulse: 107   Resp: (!) 39   Temp:    SpO2: 94%     FiO2 : 100 %  O2 Flow Rate (L/min): 40 L/min  O2 Device: Ventilator    PHYSICAL EXAM:  Constitutional: Low-grade temperatures, no chills, no diaphoresis  Skin: No skin rash, no skin breakdown  HEENT: Endotracheal tube secured at lips  Neck: JVD, lymphadenopathy, thyromegaly  Cardiovascular: S1, S2 irregular.   No S3, S4 murmurs or rubs  Respiratory: Inspiratory crackles throughout all lung fields anteriorly and posteriorly  Gastrointestinal: Obese, no hepatosplenomegaly  Genitourinary: No grossly bloody urine  Extremities: No clubbing, cyanosis, or edema  Neurological: Sedated  Psychological: Sedated    LABS:  WBC   Date Value Ref Range Status   05/19/2020 4.9 4.5 - 11.5 E9/L Final   05/18/2020 3.5 (L) 4.5 - 11.5 E9/L Final   05/17/2020 3.2 (L) 4.5 - 11.5 E9/L Final     Hemoglobin   Date Value Ref Range Status   05/19/2020 11.5 11.5 - 15.5 g/dL Final   05/18/2020 9.6 (L) 11.5 - 15.5 g/dL Final   05/17/2020 9.2 (L) 11.5 - 15.5 g/dL Final     Hematocrit   Date Value Ref Range Status   05/19/2020 37.9 34.0 - 48.0 % Final   05/18/2020 32.0 (L) 34.0 - 48.0 % Final   05/17/2020 30.5 (L) 34.0 - 48.0 % Final   05/17/2020 31.0 (L) 34.0 - 48.0 % Final     MCV   Date Value Ref Range Status   05/19/2020 88.6 80.0 - 99.9 fL Final   05/18/2020 90.9 80.0 - 99.9 fL Final   05/17/2020 89.7 80.0 - 99.9 fL Final     Platelets   Date Value Ref Range Status   05/19/2020 236 130 - 450 E9/L Final   05/18/2020 225 130 - 450 E9/L Final   05/17/2020 219 130 - 450 E9/L Final     Sodium   Date Value Ref Range Status   05/19/2020 139 132 - 146 mmol/L Final   05/18/2020 144 132 - 146 mmol/L Final   05/17/2020 142 132 - 146 mmol/L Final     Potassium   Date Value Ref Range Status   05/19/2020 4.0 3.5 - 5.0 mmol/L Final   05/18/2020 3.7 3.5 - 5.0 mmol/L Final   05/17/2020 3.5 3.5 - 5.0 mmol/L Final     Potassium reflex Magnesium   Date Value Ref Range Status   05/14/2020 4.4 3.5 - 5.0 mmol/L Final   10/19/2018 4.4 3.5 - 5.0 mmol/L Final     Chloride   Date Value Ref Range Status   05/19/2020 95 (L) 98 - 107 mmol/L Final   05/18/2020 99 98 - 107 mmol/L Final   05/17/2020 97 (L) 98 - 107 mmol/L Final     CO2   Date Value Ref Range Status   05/19/2020 31 (H) 22 - 29 mmol/L Final   05/18/2020 33 (H) 22 - 29 mmol/L Final   05/17/2020 31 (H) 22 - 29 mmol/L Final     BUN   Date Value Ref Range Status   05/19/2020 19 8 - 23 mg/dL Final   05/18/2020 23 8 - 23 mg/dL Final   05/17/2020 28 (H) 8 - 23 mg/dL Final     CREATININE   Date Value Ref Range Status   05/19/2020 0.8 0.5 - 1.0 mg/dL Final   05/18/2020 0.8 0.5 - 1.0 mg/dL Final   05/17/2020 0.8 0.5 - 1.0 mg/dL Final     Glucose   Date Value Ref Range Status   05/19/2020 160 (H) 74 - 99 mg/dL Final   05/18/2020 123 (H) 74 - 99 mg/dL Final   05/17/2020 109 (H) 74 - 99 mg/dL Final   04/17/2012 208 (H) 70 - 110 mg/dL Final   01/10/2012 150 (H) 70 - 110 mg/dL Final   06/14/2011 162 (H) 70 - 110 mg/dL Final     Calcium   Date Value Ref Range Status   05/19/2020 8.3 (L) 8.6 - 10.2 mg/dL Final   05/18/2020 8.2 (L) 8.6 - 10.2 mg/dL Final   05/17/2020 8.3 (L) 8.6 - 10.2 mg/dL Final     Total Protein   Date Value Ref Range Status   05/19/2020 8.0 6.4 - 8.3 g/dL Final   05/18/2020 6.9 6.4 - 8.3 g/dL Final 05/17/2020 6.8 6.4 - 8.3 g/dL Final     Albumin   Date Value Ref Range Status   04/17/2012 3.7 3.2 - 4.8 g/dL Final   01/10/2012 3.9 3.2 - 4.8 g/dL Final   06/14/2011 4.0 3.2 - 4.8 g/dL Final     Alb   Date Value Ref Range Status   05/19/2020 2.7 (L) 3.5 - 5.2 g/dL Final   05/18/2020 2.4 (L) 3.5 - 5.2 g/dL Final   05/17/2020 2.6 (L) 3.5 - 5.2 g/dL Final     Total Bilirubin   Date Value Ref Range Status   05/19/2020 0.6 0.0 - 1.2 mg/dL Final   05/18/2020 0.4 0.0 - 1.2 mg/dL Final   05/17/2020 0.4 0.0 - 1.2 mg/dL Final     Alkaline Phosphatase   Date Value Ref Range Status   05/19/2020 211 (H) 35 - 104 U/L Final   05/18/2020 185 (H) 35 - 104 U/L Final   05/17/2020 199 (H) 35 - 104 U/L Final     AST   Date Value Ref Range Status   05/19/2020 53 (H) 0 - 31 U/L Final   05/18/2020 43 (H) 0 - 31 U/L Final   05/17/2020 47 (H) 0 - 31 U/L Final     ALT   Date Value Ref Range Status   05/19/2020 26 0 - 32 U/L Final   05/18/2020 26 0 - 32 U/L Final   05/17/2020 30 0 - 32 U/L Final     GFR Non-   Date Value Ref Range Status   05/19/2020 >60 >=60 mL/min/1.73 Final     Comment:     Chronic Kidney Disease: less than 60 ml/min/1.73 sq.m. Kidney Failure: less than 15 ml/min/1.73 sq.m. Results valid for patients 18 years and older. 05/18/2020 >60 >=60 mL/min/1.73 Final     Comment:     Chronic Kidney Disease: less than 60 ml/min/1.73 sq.m. Kidney Failure: less than 15 ml/min/1.73 sq.m. Results valid for patients 18 years and older. 05/17/2020 >60 >=60 mL/min/1.73 Final     Comment:     Chronic Kidney Disease: less than 60 ml/min/1.73 sq.m. Kidney Failure: less than 15 ml/min/1.73 sq.m. Results valid for patients 18 years and older.        GFR    Date Value Ref Range Status   05/19/2020 >60  Final   05/18/2020 >60  Final   05/17/2020 >60  Final     Magnesium   Date Value Ref Range Status   05/19/2020 1.6 1.6 - 2.6 mg/dL Final   05/18/2020 1.5 (L) 1.6 - 2.6 mg/dL

## 2020-05-19 NOTE — PROGRESS NOTES
Patient intubated with size 7.5 mm ET tube at 23 cm at the lip line. No complications with procedure. Tube placement confirmed. Presence of equal and bilateral breath sounds. Condensation noted in tube. Chest x-ray ordered to confirm proper ET Tube placement.

## 2020-05-19 NOTE — PLAN OF CARE
Problem: Falls - Risk of:  Goal: Will remain free from falls  Description: Will remain free from falls  Outcome: Met This Shift  Goal: Absence of physical injury  Description: Absence of physical injury  Outcome: Met This Shift     Problem: Skin Integrity:  Goal: Will show no infection signs and symptoms  Description: Will show no infection signs and symptoms  Outcome: Met This Shift  Goal: Absence of new skin breakdown  Description: Absence of new skin breakdown  Outcome: Met This Shift     Problem: OXYGENATION/RESPIRATORY FUNCTION  Goal: Patient will maintain patent airway  Outcome: Met This Shift     Problem: Restraint Use - Nonviolent/Non-Self-Destructive Behavior:  Goal: Absence of restraint-related injury  Description: Absence of restraint-related injury  5/19/2020 1901 by Bimal Moreno RN  Outcome: Met This Shift  5/19/2020 0731 by Vanna Maria RN  Outcome: Met This Shift     Problem: OXYGENATION/RESPIRATORY FUNCTION  Goal: Patient will achieve/maintain normal respiratory rate/effort  Description: Respiratory rate and effort will be within normal limits for the patient  Outcome: Not Met This Shift     Problem: Restraint Use - Nonviolent/Non-Self-Destructive Behavior:  Goal: Absence of restraint indications  Description: Absence of restraint indications  5/19/2020 1901 by Bimal Moreno RN  Outcome: Not Met This Shift  5/19/2020 0731 by Vanna Maria RN  Outcome: Not Met This Shift

## 2020-05-20 NOTE — PROGRESS NOTES
Ref Range Status   05/20/2020 11.6 11.5 - 15.5 g/dL Final   05/19/2020 11.5 11.5 - 15.5 g/dL Final   05/18/2020 9.6 (L) 11.5 - 15.5 g/dL Final     Hematocrit   Date Value Ref Range Status   05/20/2020 39.6 34.0 - 48.0 % Final   05/19/2020 37.9 34.0 - 48.0 % Final   05/18/2020 32.0 (L) 34.0 - 48.0 % Final     MCV   Date Value Ref Range Status   05/20/2020 89.8 80.0 - 99.9 fL Final   05/19/2020 88.6 80.0 - 99.9 fL Final   05/18/2020 90.9 80.0 - 99.9 fL Final     Platelets   Date Value Ref Range Status   05/20/2020 223 130 - 450 E9/L Final   05/19/2020 236 130 - 450 E9/L Final   05/18/2020 225 130 - 450 E9/L Final     Sodium   Date Value Ref Range Status   05/20/2020 139 132 - 146 mmol/L Final   05/19/2020 141 132 - 146 mmol/L Final   05/19/2020 139 132 - 146 mmol/L Final     Potassium   Date Value Ref Range Status   05/20/2020 4.1 3.5 - 5.0 mmol/L Final   05/19/2020 3.0 (L) 3.5 - 5.0 mmol/L Final   05/19/2020 4.0 3.5 - 5.0 mmol/L Final     Potassium reflex Magnesium   Date Value Ref Range Status   05/14/2020 4.4 3.5 - 5.0 mmol/L Final   10/19/2018 4.4 3.5 - 5.0 mmol/L Final     Chloride   Date Value Ref Range Status   05/20/2020 96 (L) 98 - 107 mmol/L Final   05/19/2020 101 98 - 107 mmol/L Final   05/19/2020 95 (L) 98 - 107 mmol/L Final     CO2   Date Value Ref Range Status   05/20/2020 30 (H) 22 - 29 mmol/L Final   05/19/2020 29 22 - 29 mmol/L Final   05/19/2020 31 (H) 22 - 29 mmol/L Final     BUN   Date Value Ref Range Status   05/20/2020 17 8 - 23 mg/dL Final   05/19/2020 15 8 - 23 mg/dL Final   05/19/2020 19 8 - 23 mg/dL Final     CREATININE   Date Value Ref Range Status   05/20/2020 0.7 0.5 - 1.0 mg/dL Final   05/19/2020 0.6 0.5 - 1.0 mg/dL Final   05/19/2020 0.8 0.5 - 1.0 mg/dL Final     Glucose   Date Value Ref Range Status   05/20/2020 209 (H) 74 - 99 mg/dL Final   05/19/2020 165 (H) 74 - 99 mg/dL Final   05/19/2020 160 (H) 74 - 99 mg/dL Final   04/17/2012 208 (H) 70 - 110 mg/dL Final   01/10/2012 150 (H) 70 - 110 mg/dL Final   06/14/2011 162 (H) 70 - 110 mg/dL Final     Calcium   Date Value Ref Range Status   05/20/2020 8.4 (L) 8.6 - 10.2 mg/dL Final   05/19/2020 7.1 (L) 8.6 - 10.2 mg/dL Final   05/19/2020 8.3 (L) 8.6 - 10.2 mg/dL Final     Total Protein   Date Value Ref Range Status   05/20/2020 7.6 6.4 - 8.3 g/dL Final   05/19/2020 8.0 6.4 - 8.3 g/dL Final   05/18/2020 6.9 6.4 - 8.3 g/dL Final     Albumin   Date Value Ref Range Status   04/17/2012 3.7 3.2 - 4.8 g/dL Final   01/10/2012 3.9 3.2 - 4.8 g/dL Final   06/14/2011 4.0 3.2 - 4.8 g/dL Final     Alb   Date Value Ref Range Status   05/20/2020 2.8 (L) 3.5 - 5.2 g/dL Final   05/19/2020 2.7 (L) 3.5 - 5.2 g/dL Final   05/18/2020 2.4 (L) 3.5 - 5.2 g/dL Final     Total Bilirubin   Date Value Ref Range Status   05/20/2020 0.6 0.0 - 1.2 mg/dL Final   05/19/2020 0.6 0.0 - 1.2 mg/dL Final   05/18/2020 0.4 0.0 - 1.2 mg/dL Final     Alkaline Phosphatase   Date Value Ref Range Status   05/20/2020 185 (H) 35 - 104 U/L Final   05/19/2020 211 (H) 35 - 104 U/L Final   05/18/2020 185 (H) 35 - 104 U/L Final     AST   Date Value Ref Range Status   05/20/2020 45 (H) 0 - 31 U/L Final   05/19/2020 53 (H) 0 - 31 U/L Final   05/18/2020 43 (H) 0 - 31 U/L Final     ALT   Date Value Ref Range Status   05/20/2020 22 0 - 32 U/L Final   05/19/2020 26 0 - 32 U/L Final   05/18/2020 26 0 - 32 U/L Final     GFR Non-   Date Value Ref Range Status   05/20/2020 >60 >=60 mL/min/1.73 Final     Comment:     Chronic Kidney Disease: less than 60 ml/min/1.73 sq.m. Kidney Failure: less than 15 ml/min/1.73 sq.m. Results valid for patients 18 years and older. 05/19/2020 >60 >=60 mL/min/1.73 Final     Comment:     Chronic Kidney Disease: less than 60 ml/min/1.73 sq.m. Kidney Failure: less than 15 ml/min/1.73 sq.m. Results valid for patients 18 years and older.      05/19/2020 >60 >=60 mL/min/1.73 Final     Comment:     Chronic Kidney Disease: less than 60 ml/min/1.73 sq.m.          Kidney Failure: less than 15 ml/min/1.73 sq.m. Results valid for patients 18 years and older. GFR    Date Value Ref Range Status   05/20/2020 >60  Final   05/19/2020 >60  Final   05/19/2020 >60  Final     Magnesium   Date Value Ref Range Status   05/20/2020 2.0 1.6 - 2.6 mg/dL Final   05/19/2020 1.3 (L) 1.6 - 2.6 mg/dL Final   05/19/2020 1.6 1.6 - 2.6 mg/dL Final     Phosphorus   Date Value Ref Range Status   05/20/2020 4.7 (H) 2.5 - 4.5 mg/dL Final   05/19/2020 2.2 (L) 2.5 - 4.5 mg/dL Final   05/19/2020 2.8 2.5 - 4.5 mg/dL Final     Recent Labs     05/20/20  0819   PH 7.403   PO2 86.6   PCO2 57.8*   HCO3 35.2*   BE 8.7*   O2SAT 95.9       RADIOLOGY:  XR CHEST PORTABLE   Final Result   1. Withdrawal of the endotracheal tube with its tip at or above the thoracic   inlet. I would suggest advancing the tube at least 3 cm to be within the mid   trachea. 2. Otherwise, stable chest x-ray with findings likely reflecting congestive   failure or pneumonia. Follow-up to resolution is recommended. XR CHEST PORTABLE   Final Result   Right upper extremity PICC line with tip in the SVC. Stable ET tube and enteric tube. Stable examination with multifocal airspace consolidation and bilateral   pleural effusions. Findings would be consistent with a multifocal pneumonia. Given history of positive COVID-19 test, COVID-19 pneumonia is high in the   differential.         XR CHEST PORTABLE   Final Result   Stable bilateral pulmonary infiltrates      Probable right pleural effusion      Endotracheal tube tip in good position         XR ABDOMEN FOR NG/OG/NE TUBE PLACEMENT   Final Result   Nasogastric tube tip overlies the region of the antrum of the stomach         XR CHEST PORTABLE   Final Result   Further likely interval worsening diffuse airspace and interstitial   abnormalities, as above.          XR CHEST PORTABLE   Final Result   Probable interval worsening diffuse Problems:    * No resolved hospital problems. *      IMPRESSION:  1. Acute respiratory failure secondary to COVID-19 pneumonitis  2. Hypotension requiring pressors  3. Bradycardia requiring pressors  4. Chronic atrial fibrillation  5. Morbid obesity  6. Diabetes mellitus type 2    PLAN:  1. Patient continues to require low-dose dopamine for her heart rate and Levophed for her blood pressure  2. Aerosolized bronchodilators  3. Tube feedings  4. Consent pending to change CODE STATUS  5. Hold furosemide secondary to hypotension  6. Prognosis poor    ATTESTATION:  ICU Staff Physician note of personal involvement in Care  As the attending physician, I certify that I personally reviewed the patients history and personally examined the patient to confirm the physical findings described above,  And that I reviewed the relevant imaging studies and available reports. I also discussed the differential diagnosis and all of the proposed management plans with the patient and individuals accompanying the patient to this visit. They had the opportunity to ask questions about the proposed management plans and to have those questions answered. This patient has a high probability of sudden, clinically significant deterioration, which requires the highest level of physician preparedness to intervene urgently. I managed/supervised life or organ supporting interventions that required frequent physician assessment. I devoted my full attention to the direct care of this patient for the amount of time indicated below. Time I spent with the family or surrogate(s) is included only if the patient was incapable of providing the necessary information or participating in medical decisions - Time devoted to teaching and to any procedures I billed separately is not included.     CRITICAL CARE TIME:  38 minutes    Electronically signed by Ramy Shah MD on 5/20/2020 at 2:15 PM

## 2020-05-20 NOTE — SIGNIFICANT EVENT
[address] [place], [place] [address]ist RRT Note    Subjective:    Called to bedside for tachycardia and hypotension. Upon arrival, patient is sedated and intubated. Per nurse report, patient has been on dopamine gtt due to bradycardia and hypotension, after dopamine drip was started, HR has been increased, then suddenly HR increased to 140s with MAP dropped to 40s. RRT was called.         midazolam  5 mg Intravenous Once    lidocaine  5 mL Intradermal Once    vancomycin  1,500 mg Intravenous Q18H    sodium chloride  20 mL Intravenous Once    insulin lispro  0-12 Units Subcutaneous 4 times per day    insulin lispro  0-6 Units Subcutaneous Nightly    doxycycline hyclate  100 mg Oral 2 times per day    furosemide  20 mg Intravenous BID    folic acid  1 mg Oral Daily    ipratropium-albuterol  1 ampule Inhalation Q4H    lactobacillus  1 capsule Oral Daily    insulin glargine  18 Units Subcutaneous Nightly    potassium bicarb-citric acid  20 mEq Oral Daily    miconazole   Topical BID    acetaminophen  650 mg Oral Once    sodium chloride flush  10 mL Intravenous 2 times per day    cefepime  1 g Intravenous Q8H    lidocaine  5 mL Intradermal Once    heparin flush  3 mL Intravenous 2 times per day    zinc gluconate  50 mg Oral Daily    allopurinol  100 mg Oral BID    apixaban  5 mg Oral BID    calcium-vitamin D  1 tablet Oral Daily    gabapentin  300 mg Oral TID    levothyroxine  200 mcg Oral QAM AC    pantoprazole  40 mg Oral QAM AC    sertraline  25 mg Oral Nightly    atorvastatin  10 mg Oral Nightly    insulin glargine  10 Units Subcutaneous Once     sodium chloride flush, 10 mL, PRN  sodium chloride flush, 10 mL, PRN  heparin flush, 3 mL, PRN  acetaminophen, 650 mg, Q6H PRN    Or  acetaminophen, 650 mg, Q6H PRN  glucose, 15 g, PRN  dextrose, 12.5 g, PRN  glucagon (rDNA), 1 mg, PRN  dextrose, 100 mL/hr, PRN         Objective:    /72   Pulse 63   Temp 96.5 °F (35.8 °C) (Oral)   Resp 20   Ht 5' 5\" (1.651 m)   Wt 257 lb 12.8 oz (116.9 kg)   SpO2 97%   BMI 42.90 kg/m²   General Appearance: sedated and intubated, obese  Head: normocephalic and atraumatic  Pulmonary/Chest: decreased breath sound  Cardiovascular: tachycardiac   Extremities: generalized edema  Neurological: sedated, comfortable      Recent Labs     05/18/20  0541 05/19/20  0441 05/19/20  1845    139 141   K 3.7 4.0 3.0*   CL 99 95* 101   CO2 33* 31* 29   BUN 23 19 15   CREATININE 0.8 0.8 0.6   GLUCOSE 123* 160* 165*   CALCIUM 8.2* 8.3* 7.1*       Recent Labs     05/17/20  0525 05/18/20  0541 05/19/20  0441   WBC 3.2* 3.5* 4.9   RBC 3.40* 3.52 4.28   HGB 9.2* 9.6* 11.5   HCT 31.0*  30.5* 32.0* 37.9   MCV 89.7 90.9 88.6   MCH 27.1 27.3 26.9   MCHC 30.2* 30.0* 30.3*   RDW 18.3* 18.2* 18.6*    225 236   MPV 11.1 10.4 10.4     Magnesium: 1.3    I/O last 3 completed shifts: In: 1897 [I.V.:1247; NG/GT:150; IV Piggyback:500]  Out: 1460 [Urine:2375]  I/O this shift:  In: 208 [Blood:208]  Out: 705 [Urine:705]          Assessment:    Active Problems:    Atrial fibrillation with RVR ([facility] [address]    DM type 2 (diabetes mellitus, type 2) (HCC)    Morbid obesity ([address]    Type 2 diabetes mellitus, without long-term current use of insulin (HCC)    Acute respiratory failure with hypoxia (HCC)    Pneumonia    History of 2019 novel coronavirus disease (COVID-19)    Required emergent intubation    Acute pulmonary edema (HCC)    Acute respiratory distress syndrome (ARDS) due to COVID-19 virus    COVID-19  Resolved Problems:    * No resolved hospital problems. *      Plan:  1. Tachycardia, was bradycardia before, tachycardia due to dopamine infusion  2. Hypotension, likely septic shock from COVID19  3.  Hypoxic respiratory failure, COVID19    Decrease rate of dopamine, titrate for HR > 60  Start Levophed during RRT to titrate MAP >  65  Arterial line inserted during RRT  Lab results reviewed, hypokalemia and hypomagnesemia from

## 2020-05-20 NOTE — PROGRESS NOTES
FERRITIN  --   --  1,165  --   --    LDH  --   --  477*  --   --    TROPONINI  --   --  0.03  --   --    DDIMER  --  0530 5838  --  3024   FIBRINOGEN  --  469 445  --  457   INR  --   --   --  1.5  --    PROTIME  --   --   --  16.7*  --    AST 43*  --  53*  --  45*   ALT 26  --  26  --  22     Lab Results   Component Value Date    CHOL 42 05/16/2020    TRIG 27 05/16/2020    HDL 26 05/16/2020    LDLCALC 11 05/16/2020    LABVLDL 5 05/16/2020           Radiology:   XR CHEST PORTABLE   Final Result   1. Withdrawal of the endotracheal tube with its tip at or above the thoracic   inlet. I would suggest advancing the tube at least 3 cm to be within the mid   trachea. 2. Otherwise, stable chest x-ray with findings likely reflecting congestive   failure or pneumonia. Follow-up to resolution is recommended. XR CHEST PORTABLE   Final Result   Right upper extremity PICC line with tip in the SVC. Stable ET tube and enteric tube. Stable examination with multifocal airspace consolidation and bilateral   pleural effusions. Findings would be consistent with a multifocal pneumonia. Given history of positive COVID-19 test, COVID-19 pneumonia is high in the   differential.         XR CHEST PORTABLE   Final Result   Stable bilateral pulmonary infiltrates      Probable right pleural effusion      Endotracheal tube tip in good position         XR ABDOMEN FOR NG/OG/NE TUBE PLACEMENT   Final Result   Nasogastric tube tip overlies the region of the antrum of the stomach         XR CHEST PORTABLE   Final Result   Further likely interval worsening diffuse airspace and interstitial   abnormalities, as above. XR CHEST PORTABLE   Final Result   Probable interval worsening diffuse airspace and interstitial abnormalities,   especially right upper lung with moderate cardiomegaly and probable right   pleural effusion. Findings likely represent known pneumonia with underlying   CHF/edema and possible ARDS. XR CHEST PORTABLE   Final Result   1. No significant change in diffuse airspace interstitial opacities   bilaterally likely due to edema (cardiogenic or noncardiogenic) and/or   pneumonia. 2. Moderate cardiomegaly, small to moderate right pleural effusion, and   questionable trace left pleural effusion, findings that can be seen with   congestive heart failure. 3. Unchanged right basilar passive atelectasis. Superimposed pneumonia is   not excluded. CT CHEST WO CONTRAST   Final Result   Cardiomegaly with asymmetric edema versus multifocal infection. Small right pleural effusion. Indeterminate mediastinal lymphadenopathy. Cardiomegaly. XR CHEST PORTABLE   Final Result   Stable chest with no change in diffuse bilateral interstitial and airspace   disease plus or minus right pleural effusion. Findings may reflect edema or   multifocal infection superimposed on chronic interstitial lung changes. XR CHEST PORTABLE   Final Result   Findings favoring pulmonary edema with small-moderate right pleural effusion,   cardiomegaly and pulmonary vascular indistinctness. 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 Pneumonia due to COVID-19- transferred to ICU Saturday May 16th. ID following. Complete 5 days of hydroxychloroquine Monday morning. 5/18.    Received

## 2020-05-20 NOTE — PROGRESS NOTES
Pharmacy Consultation Note  (Antibiotic Dosing and Monitoring)    Initial consult date:   Consulting physician: Dr Luke Sotelo  Drug(s): Vancomycin IV  Indication: Healthcare Associated Pneumonia    Ht Readings from Last 1 Encounters:   05/15/20 5' 5\" (1.651 m)     Wt Readings from Last 1 Encounters:   20 257 lb 12.8 oz (116.9 kg)       Pt is a 81 yo F admitted from the nursing home and being treated for healthcare associated pneumonia. Recently tested positive for covid-19 on . S/P Actemra on 5/15 and convalescent plasma on     Age/  Gender ActBW IBW DW  Allergy Information   80 y.o.     female 126.1 kg 57 kg 84.74 kg  Penicillins and Sulfa antibiotics                 Date  WBC BUN/CR UOP Drug/Dose Time   Given Level(s)   (Time) Comments     (#1) 6.5 39/0.8 --- Vancomycin 1,500 mg IV Q18H   2218       (#2) 9.3 40/1.1 0.61 mL/kg/hr Vancomycin 1,500 mg IV Once 1721 Random level @ 1544 = 9.4 mcg/mL    5/15  (#3) 6.6 35/0.8 --- Vancomycin 1,500 mg IV Q18H 1205 Random level @ 0853 = 14.8 mg/mL      (#4) 2.7 33/0.8 1.1 Vancomycin 1500 mg IV q18h 0525 Level 31.6 @0630  Trough 17.2 @2344      (#5) 3.2 - 1.5 Vancomycin 1500 mg IV q18h 0057  1828       (#6) 3.5 23/0.8 --- Vancomycin 1500 mg IV q18h 1330       (#7) 4.9 19/0.8 0.94 mL/kg/hr Vancomycin 1500 mg IV q18h 1130 Trough @ 0441 = 21.1 True trough estimated ~17.7 mcg/mL     (#8) 4.3 17/0.7 0.9 mL/kg/hr Vancomycin 1500 mg IV q18h 1232       Estimated Creatinine Clearance: 75 mL/min (based on SCr of 0.7 mg/dL).   UOP over the past 24 hours:       Intake/Output Summary (Last 24 hours) at 2020 1200  Last data filed at 2020 0600  Gross per 24 hour   Intake 2957 ml   Output 2530 ml   Net 427 ml       Temp max: Temp (24hrs), Av.4 °F (35.2 °C), Min:93.2 °F (34 °C), Max:98.2 °F (36.8 °C)      Antibiotic Regimen:  Antibiotic Dose Date Initiated Date Discontinued   Cefepime   1 g IV Q8H     Doxycycline 100 mg IV Q12H  5/18   Doxycycline 100 mg PO BID  5/18      Cultures:  available culture and sensitivity results were reviewed in EPIC  Cultures sent and are pending. Culture Date Result    Covid-19 (previous encounter) 5/11 Positive   Blood #1   5/14 NG   Blood #2   5/14 NG   Wound Culture   5/14 NG   Respiratory Panel 5/14 Negative   Legionella Antigen 5/14 Negative   Covid-19   5/18 Positive   Endotrachial Resp Culture 5/19 Few yeast   Few Gram positive cocci in chains   Few gram positive rods Diphtheroid-like     Assessment:  · Consulted by Dr. Benjie Tello to dose/monitor vancomycin  · Goal trough level:  15-20 mcg/mL  · Pt is a 81 y/o F who is covid-19 positive and being treated for healthcare aquired pneumonia  · Serum creatinine today: 0.7 ; CrCl ~ 75 mL/min; baseline Scr ~ 0.9  · 5/14: Random vancomycin level @ 1544 = 9.4 mcg/mL (~18 hours)  · 5/15: Random level @ 0853 = 14.8 mcg/mL  · 5/16: Level drawn this morning was drawn 1 hour after vancomycin was initiated - not a true trough. Resp Cx still pending  · 5/17: Trough therapeutic (17.2)  · 5/19: Trough @ 0441 = 21.1 mcg/mL; following dose was held. Estimated true trough ~ 17.7 mcg/mL. Renal function remaining stable.  Patient intubated    Plan:  · Vancomycin 1500 mg IV q18h  · Re-draw trough before tomorrow night's dose to test for further accumulation  · Follow renal function  · Pharmacist will follow and monitor/adjust dosing as necessary      Thank you for the consult,    Marbella StanleyD, [facility] 5/20/2020 12:00 PM

## 2020-05-20 NOTE — PROGRESS NOTES
[address] [place] [address] [facility]  Progress Note    NAME:Genet Collins  1935  DATE:05/20/20    Pt was seen face to face  FOR COVID    SUBJECTIVE:  Chief Complaint   Patient presents with    Shortness of Breath     also headache, positive for COVID-19   PT IN BED/icu  On vent since 5/19  Hypothermia   Spoke with nurse   s/p RRT x2    Review of systems:  As stated above in the chief complaint, otherwise negative.     Medications:  Scheduled Meds:   [START ON 5/21/2020] pantoprazole  40 mg Intravenous Daily    And    [START ON 5/21/2020] sodium chloride (PF)  10 mL Intravenous Daily    midazolam  5 mg Intravenous Once    lidocaine  5 mL Intradermal Once    vancomycin  1,500 mg Intravenous Q18H    sodium chloride  20 mL Intravenous Once    insulin lispro  0-12 Units Subcutaneous 4 times per day    insulin lispro  0-6 Units Subcutaneous Nightly    doxycycline hyclate  100 mg Oral 2 times per day    [Held by provider] furosemide  20 mg Intravenous BID    folic acid  1 mg Oral Daily    ipratropium-albuterol  1 ampule Inhalation Q4H    lactobacillus  1 capsule Oral Daily    insulin glargine  18 Units Subcutaneous Nightly    potassium bicarb-citric acid  20 mEq Oral Daily    miconazole   Topical BID    acetaminophen  650 mg Oral Once    sodium chloride flush  10 mL Intravenous 2 times per day    cefepime  1 g Intravenous Q8H    lidocaine  5 mL Intradermal Once    heparin flush  3 mL Intravenous 2 times per day    zinc gluconate  50 mg Oral Daily    allopurinol  100 mg Oral BID    apixaban  5 mg Oral BID    calcium-vitamin D  1 tablet Oral Daily    gabapentin  300 mg Oral TID    levothyroxine  200 mcg Oral QAM AC    sertraline  25 mg Oral Nightly    atorvastatin  10 mg Oral Nightly    insulin glargine  10 Units Subcutaneous Once     Continuous Infusions:   fentaNYL 5 mcg/ml in 0.9%  ml infusion 75 mcg/hr (05/20/20 0600)    norepinephrine 6 mcg/min (05/20/20 1123)    dextrose 5% in lactated ringers 50 mL/hr at 20 0431    DOPamine 2 mcg/kg/min (20 0846)    propofol 35 mcg/kg/min (20 0647)    sodium chloride 12.5 mL/hr (20 1117)    dextrose       PRN Meds:sodium chloride flush, sodium chloride flush, heparin flush, acetaminophen **OR** acetaminophen, glucose, dextrose, glucagon (rDNA), dextrose    OBJECTIVE:  /64   Pulse 67   Temp 96.8 °F (36 °C) (Axillary)   Resp 14   Ht 5' 5\" (1.651 m)   Wt 257 lb 12.8 oz (116.9 kg)   SpO2 93%   BMI 42.90 kg/m²   Temp  Av.4 °F (35.2 °C)  Min: 93.2 °F (34 °C)  Max: 98.2 °F (36.8 °C)  Constitutional:  The patient is on vent     CNS    Sedated /vent   HEENT at/nc   CVS s1/s2  RS dec bs ant   ABD soft nt   ext no edema     PICC RUE   Lines: pIV  Macdonald     Radiology:  Laboratory and Tests Review:  Lab Results   Component Value Date    WBC 4.3 (L) 2020    WBC 4.9 2020    WBC 3.5 (L) 2020    HGB 11.6 2020    HCT 39.6 2020    MCV 89.8 2020     2020     No results found for: [facility]  Lab Results   Component Value Date    ALT 22 2020    AST 45 (H) 2020    ALKPHOS 185 (H) 2020    BILITOT 0.6 2020     Lab Results   Component Value Date     2020    K 4.1 2020    K 4.4 2020    CL 96 2020    CO2 30 2020    BUN 17 2020    CREATININE 0.7 2020    CREATININE 0.6 2020    CREATININE 0.8 2020    GFRAA >60 2020    LABGLOM >60 2020    GLUCOSE 209 2020    GLUCOSE 208 2012    PROT 7.6 2020    LABALBU 2.8 2020    LABALBU 3.7 2012    CALCIUM 8.4 2020    BILITOT 0.6 2020    ALKPHOS 185 2020    AST 45 2020    ALT 22 2020     Lab Results   Component Value Date    CRP 1.9 (H) 2020    CRP 3.4 (H) 2020    CRP 4.4 (H) 2020     Lab Results   Component Value Date    SEDRATE 83 (H) 10/19/2018 Microbiology:   Recent Labs     05/18/20  0542   COVID19 DETECTED*     Recent Labs     05/18/20  0541 05/18/20  0542 05/19/20  0441 05/20/20  0117 05/20/20  0426   CRP  --  4.4* 3.4*  --  1.9*   PROCAL  --   --  0.14*  --   --    FERRITIN  --   --  1,165  --   --    LDH  --   --  477*  --   --    TROPONINI  --   --  0.03  --   --    DDIMER  --  1625 2635  --  3024   FIBRINOGEN  --  469 445  --  457   INR  --   --   --  1.5  --    PROTIME  --   --   --  16.7*  --    AST 43*  --  53*  --  45*   ALT 26  --  26  --  22     Lab Results   Component Value Date    CHOL 42 05/16/2020    TRIG 27 05/16/2020    HDL 26 05/16/2020    LDLCALC 11 05/16/2020    LABVLDL 5 05/16/2020       Lab Results   Component Value Date    BLOODCULT2 5 Days- no growth 05/14/2020    BLOODCULT2 5 Days- no growth 02/12/2016    ORG Proteus mirabilis 05/01/2020    ORG Enterococcus faecalis 05/01/2020    ORG Escherichia coli 12/13/2019     WOUND/ABSCESS   Date Value Ref Range Status   05/14/2020 Growth not present  Final   05/01/2020   Final    One colony  Too fastidious for susceptibility testing. 12/13/2019 (A)  Final    Mixed delbert isolated. Further workup and sensitivity testing  is not routinely indicated and will not be performed.   Mixed delbert isolated includes:  Beta hemolytic Strep species (Group B)  Corynebacteria  Alpha hemolytic species (catalase negative gram positive coccobacili)     12/13/2019 One colony  Final        ASSESSMENT/PLAN:    Pt is being RX FOR covid-19 f/u covid posiitve  POSS ASPIRATION  resp failure on vent -S bilateral pulmonary infiltrates  Probable right pleural effusion  Leukopenia resolved  sacral ulcer poa stage 3     -supportive care  -wound care  -mrsa screen       Follow COVID-19/ERNESTINE-COV2 LABS    5/15 tocilizumab (ACTEMRA)  5/19 convalescent plasma     The assigned patient code is [id number] for convalescent palsma       vancomycin (VANCOCIN) 1,500 mg in dextrose 5 % 300 mL IVPB, Q18H     doxycycline hyclate (VIBRAMYCIN) capsule 100 mg, 2 times per day     miconazole (MICOTIN) 2 % powder, BID     cefepime (MAXIPIME) 1 g IVPB extended (mini-bag), Q8H     zinc gluconate tablet 50 mg, Daily         check resp cx pending   Group 5: >25 PMN's/LPF and <10 Epithelial cells/LPF   Few Polymorphonuclear leukocytes   Epithelial cells not seen   Few yeast   Few Gram positive cocci in chains   Few gram positive rods Diphtheroid-like       · Monitor labs    Imaging and labs were reviewed per medical records.         Electronically signed by Ella Lee MD on 5/20/2020 at 1:42 PM

## 2020-05-21 NOTE — PROGRESS NOTES
(36.1 °C), Max:98.6 °F (37 °C)      Antibiotic Regimen:  Antibiotic Dose Date Initiated Date Discontinued   Cefepime   1 g IV Q8H 5/13    Doxycycline 100 mg IV Q12H 5/14 5/18   Doxycycline 100 mg PO BID  5/18      Cultures:  available culture and sensitivity results were reviewed in EPIC  Cultures sent and are pending. Culture Date Result    Covid-19 (previous encounter) 5/11 Positive   Blood #1   5/14 NG   Blood #2   5/14 NG   Wound Culture   5/14 NG   Respiratory Panel 5/14 Negative   Legionella Antigen 5/14 Negative   Covid-19   5/18 Positive   Endotrachial Resp Culture 5/19 Few yeast   Few Gram positive cocci in chains   Few gram positive rods Diphtheroid-like     Assessment:  · Consulted by Dr. Abhijit Palma to dose/monitor vancomycin  · Goal trough level:  15-20 mcg/mL  · Pt is a 81 y/o F who is covid-19 positive and being treated for healthcare aquired pneumonia  · Serum creatinine today: 1.1 ; CrCl ~ 48 mL/min; baseline Scr ~ 0.9  · 5/14: Random vancomycin level @ 1544 = 9.4 mcg/mL (~18 hours)  · 5/15: Random level @ 0853 = 14.8 mcg/mL  · 5/16: Level drawn this morning was drawn 1 hour after vancomycin was initiated - not a true trough. Resp Cx still pending  · 5/17: Trough therapeutic (17.2)  · 5/19: Trough @ 0441 = 21.1 mcg/mL; following dose was held. Estimated true trough ~ 17.7 mcg/mL. Renal function remaining stable. Patient intubated  · 5/21: Rapid increase in sCr from day prior; UOP drastically reduced.  Trough ordered prior to next dose @ 1705 = 25.2 mcg/mL    Plan:  · Hold vancomycin tonight; begin dosing vancomycin by levels due to rapid decline in kidney function  · Random level tomorrow with morning labs  · Follow renal function  · Pharmacist will follow and monitor/adjust dosing as necessary      Thank you for the consult,    Jose Jackson PharmD, [facility] 5/21/2020 11:02 AM

## 2020-05-21 NOTE — PROGRESS NOTES
COVID-19 virus    COVID-19    Pneumonia due to COVID-19 virus    Acute hypoxemic respiratory failure (HCC)    Hypotension  Resolved Problems:    * No resolved hospital problems. *      IMPRESSION:  1. Acute respiratory failure secondary to COVID-19 pneumonitis  2. Increasing d-dimer titers suggestive of ongoing thromboembolic disease and/or DIC, more likely the former in view of the fact that fibrinogen levels are stable  3. Question GI bleed but INR yesterday was within normal limits  4. Bradycardia and hypotension requiring pressors  5. Chronic atrial fibrillation  6. Diabetes mellitus type 2    PLAN:  1. With rising d-dimer, there is no choice but to continue anticoagulation if at all possible. Because there are no weight-based guidelines on apixaban, I have discussed this issue with the clinical pharmacist and we have decided to give the patient weight-based Lovenox but will need to monitor her hemoglobin and hematocrit carefully as well as change her pantoprazole to a drip. 2. Continue pressors as needed  3. Prone patient as long as saturation is stable which it is at current levels  4. Continue bronchodilators    ATTESTATION:  ICU Staff Physician note of personal involvement in Care  As the attending physician, I certify that I personally reviewed the patients history and personally examined the patient to confirm the physical findings described above,  And that I reviewed the relevant imaging studies and available reports. I also discussed the differential diagnosis and all of the proposed management plans with the patient and individuals accompanying the patient to this visit. They had the opportunity to ask questions about the proposed management plans and to have those questions answered. This patient has a high probability of sudden, clinically significant deterioration, which requires the highest level of physician preparedness to intervene urgently.   I managed/supervised life or organ supporting interventions that required frequent physician assessment. I devoted my full attention to the direct care of this patient for the amount of time indicated below. Time I spent with the family or surrogate(s) is included only if the patient was incapable of providing the necessary information or participating in medical decisions - Time devoted to teaching and to any procedures I billed separately is not included.     CRITICAL CARE TIME:  39 minutes    Electronically signed by Wan Arrington MD on 5/21/2020 at 2:24 PM

## 2020-05-21 NOTE — PROGRESS NOTES
8161)    dextrose 5% in lactated ringers 50 mL/hr at 20 0012    DOPamine 1 mcg/kg/min (20 0504)    propofol 35 mcg/kg/min (20 0858)    sodium chloride 12.5 mL/hr (20 0253)    dextrose       PRN Meds:sodium chloride flush, sodium chloride flush, heparin flush, acetaminophen **OR** acetaminophen, glucose, dextrose, glucagon (rDNA), dextrose    OBJECTIVE:  /82   Pulse 117   Temp 98.2 °F (36.8 °C) (Axillary)   Resp 20   Ht 5' 5\" (1.651 m)   Wt 257 lb 12.8 oz (116.9 kg)   SpO2 94%   BMI 42.90 kg/m²   Temp  Av °F (36.7 °C)  Min: 96.9 °F (36.1 °C)  Max: 98.6 °F (37 °C)  Constitutional:  The patient is on vent     CNS    Sedated /vent   NGT  HR 90'S   PICC RUE   Lines: pIV  AA LINE  PICC RUE    Macdonald     Radiology:  Laboratory and Tests Review:  Lab Results   Component Value Date    WBC 7.4 2020    WBC 4.3 (L) 2020    WBC 4.9 2020    HGB 11.3 (L) 2020    HCT 37.3 2020    MCV 90.3 2020     2020     No results found for: [facility]  Lab Results   Component Value Date    ALT 18 2020    AST 42 (H) 2020    ALKPHOS 167 (H) 2020    BILITOT 0.5 2020     Lab Results   Component Value Date     2020    K 4.0 2020    K 4.4 2020    CL 94 2020    CO2 27 2020    BUN 22 2020    CREATININE 1.1 2020    CREATININE 0.7 2020    CREATININE 0.6 2020    GFRAA 57 2020    LABGLOM 47 2020    GLUCOSE 238 2020    GLUCOSE 208 2012    PROT 7.0 2020    LABALBU 2.6 2020    LABALBU 3.7 2012    CALCIUM 8.1 2020    BILITOT 0.5 2020    ALKPHOS 167 2020    AST 42 2020    ALT 18 2020     Lab Results   Component Value Date    CRP 1.9 (H) 2020    CRP 3.4 (H) 2020    CRP 4.4 (H) 2020     Lab Results   Component Value Date    SEDRATE 83 (H) 10/19/2018       Microbiology:   No results for input(s):

## 2020-05-21 NOTE — PROGRESS NOTES
[address] [place], [place] [address]ist   ICU Progress Note    Admitting Date and Time: 5/13/2020  2:47 PM  Admit Dx: Acute hypoxemic respiratory failure ([facility] [address] [J96.01]    Subjective:     5/18: Patient resting comfortably. Attempted to communicate via phone but not next to patient. She is not in any distress. 5/19: Patient intubated this AM after developing respiratory distress when turned during a bath.      5/20: Patient had Dopamine started yesterday afternoon for bradycardia and hypotension. Overnight got tachycardia  on Doparmine and blood pressure dropped. RRT was called and Levophed was added to allow for decrease in Dopamine drip which was felt to be causing tachycardia. Electrolytes were replaced as well. Arterial line was inserted. 5/21: Tried to wean off Dopamine but got tachycardic. She vasopressor depressant. Currently, on Levophed 12 mcg and Dopamine 1 mcg.       pantoprazole  40 mg Intravenous Daily    And    sodium chloride (PF)  10 mL Intravenous Daily    insulin glargine  18 Units Subcutaneous Nightly    midazolam  5 mg Intravenous Once    lidocaine  5 mL Intradermal Once    vancomycin  1,500 mg Intravenous Q18H    sodium chloride  20 mL Intravenous Once    insulin lispro  0-12 Units Subcutaneous 4 times per day    insulin lispro  0-6 Units Subcutaneous Nightly    doxycycline hyclate  100 mg Oral 2 times per day    [Held by provider] furosemide  20 mg Intravenous BID    folic acid  1 mg Oral Daily    ipratropium-albuterol  1 ampule Inhalation Q4H    lactobacillus  1 capsule Oral Daily    potassium bicarb-citric acid  20 mEq Oral Daily    miconazole   Topical BID    acetaminophen  650 mg Oral Once    sodium chloride flush  10 mL Intravenous 2 times per day    cefepime  1 g Intravenous Q8H    lidocaine  5 mL Intradermal Once    heparin flush  3 mL Intravenous 2 times per day    zinc gluconate  50 mg Oral Daily    allopurinol  100 mg Oral BID    apixaban  5 mg Oral BID  calcium-vitamin D  1 tablet Oral Daily    gabapentin  300 mg Oral TID    levothyroxine  200 mcg Oral QAM AC    sertraline  25 mg Oral Nightly    atorvastatin  10 mg Oral Nightly    insulin glargine  10 Units Subcutaneous Once     sodium chloride flush, 10 mL, PRN  sodium chloride flush, 10 mL, PRN  heparin flush, 3 mL, PRN  acetaminophen, 650 mg, Q6H PRN    Or  acetaminophen, 650 mg, Q6H PRN  glucose, 15 g, PRN  dextrose, 12.5 g, PRN  glucagon (rDNA), 1 mg, PRN  dextrose, 100 mL/hr, PRN         Objective:    /85   Pulse 119   Temp 98.2 °F (36.8 °C) (Axillary)   Resp 20   Ht 5' 5\" (1.651 m)   Wt 257 lb 12.8 oz (116.9 kg)   SpO2 97%   BMI 42.90 kg/m²   Patient is intubated and sedated. Due to the current efforts to prevent transmission of COVID-19 and also the need to preserve PPE for other caregivers, a face-to-face encounter with the patient was not performed. That being said, all relevant records and diagnostic tests were reviewed, including laboratory results and imaging. Please reference any relevant documentation elsewhere. Care will be coordinated with pulmonary/critical care. Recent Labs     05/19/20  1845 05/20/20  0426 05/21/20  0545    139 135   K 3.0* 4.1 4.0    96* 94*   CO2 29 30* 27   BUN 15 17 22   CREATININE 0.6 0.7 1.1*   GLUCOSE 165* 209* 238*   CALCIUM 7.1* 8.4* 8.1*       Recent Labs     05/19/20  0441 05/20/20  0426 05/21/20  0545   ALKPHOS 211* 185* 167*   PROT 8.0 7.6 7.0   LABALBU 2.7* 2.8* 2.6*   BILITOT 0.6 0.6 0.5   AST 53* 45* 42*   ALT 26 22 18       Recent Labs     05/19/20  0441 05/20/20  0426 05/21/20  0545   WBC 4.9 4.3* 7.4   RBC 4.28 4.41 4.13   HGB 11.5 11.6 11.3*   HCT 37.9 39.6 37.3   MCV 88.6 89.8 90.3   MCH 26.9 26.3 27.4   MCHC 30.3* 29.3* 30.3*   RDW 18.6* 18.1* 18.3*    223 217   MPV 10.4 10.3 10.6       COVID-19/ERNESTINE-COV2 LABS  No results for input(s): COVID19 in the last 72 hours.   Recent Labs     05/19/20  0447 with underlying   CHF/edema and possible ARDS. XR CHEST PORTABLE   Final Result   1. No significant change in diffuse airspace interstitial opacities   bilaterally likely due to edema (cardiogenic or noncardiogenic) and/or   pneumonia. 2. Moderate cardiomegaly, small to moderate right pleural effusion, and   questionable trace left pleural effusion, findings that can be seen with   congestive heart failure. 3. Unchanged right basilar passive atelectasis. Superimposed pneumonia is   not excluded. CT CHEST WO CONTRAST   Final Result   Cardiomegaly with asymmetric edema versus multifocal infection. Small right pleural effusion. Indeterminate mediastinal lymphadenopathy. Cardiomegaly. XR CHEST PORTABLE   Final Result   Stable chest with no change in diffuse bilateral interstitial and airspace   disease plus or minus right pleural effusion. Findings may reflect edema or   multifocal infection superimposed on chronic interstitial lung changes. XR CHEST PORTABLE   Final Result   Findings favoring pulmonary edema with small-moderate right pleural effusion,   cardiomegaly and pulmonary vascular indistinctness. 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 Pneumonia due to COVID-19- transferred to ICU Saturday May 16th. ID following.  Complete 5 days of hydroxychloroquine Monday morning. 5/18. Received tocilizumab evening on May 15th. She was getting high dose vitamin C and IV thiamine up until Monday but then changed over to orals. . Patient is also getting Zinc. Patient received convalescent plasma 5/19 afternoon. 2. Acute hypotension and bradycardia--currently on Dopamine 1mcg/kg/min  and Levophed at 12 mcg/min  3. Acute hypoxic respiratory failure--patient intubated the morning 5/19.  4. Acute on chronic diastolic heart failure--she was  getting Lasix 20 mg IV q12 but will put on hold given hypotension. She did receive dose this AM.  5. Probable FELIX--Most likely has a sleep apnea recommend formal sleep study as an outpatient. 6. Type II DM with hyperglycemia--dose of Lantus increased over weekend from 14 to 18 units but prior to admission was taking 60 units in morning and 20 units in the evening. Added on A1c yesterday but still pending. 7. High residuals--will hold tube feeds for now. Will add Reglan 5 mg IV q6.  8. Right pleural effusion--continue to monitor. 9. Advanced directives--see is DNR CCA as of yesterday evening per intensivist discussion with son. Case discussed with Dr. Chuck Soares of pulmonary/critical care in ICU. NOTE: This report was transcribed using voice recognition software. Every effort was made to ensure accuracy; however, inadvertent computerized transcription errors may be present.      Electronically signed by Saurabh Black MD on 5/21/2020 at 10:15 AM

## 2020-05-21 NOTE — PROGRESS NOTES
Monitor Hemodynamic Status, Monitor Bowel Function      Electronically signed by Danyell Dunaway RD, DEBORA on 5/21/20 at 2:42 PM EDT    Contact Number: [phone]

## 2020-05-22 NOTE — PROGRESS NOTES
IV sacral decubitus ulcer present on admission  Positive COVID-19 infection  Morbid obesity BMI of 36  Functional quadriplegia due to diabetic polyneuropathy  Uncontrolled diabetes mellitus type 2 with associated hyperglycemia   Atherosclerotic coronary artery disease  Essential hypertension    Plan:    1. Pneumonia due to COVID-19- transferred to ICU Saturday May 16th. ID following. Complete 5 days of hydroxychloroquine Monday morning. 5/18. Received tocilizumab evening on May 15th. She was getting high dose vitamin C and IV thiamine up until Monday but then changed over to orals. . Patient is also getting Zinc. Patient received convalescent plasma 5/19 afternoon. Patient with elevated D Dimer and decision was make to take off Eliquis and place on therapeutic Lovenox. Per ID,  receiving Vanco, cefepime since 5/13 and doxycycline since 5/14  2. Acute hypotension and bradycardia--currently on Dopamine 1mcg/kg/min  and Levophed at 12 mcg/min  3. Acute hypoxic respiratory failure--patient intubated the morning 5/19. Current vent setting PC 20 FIO2 50%  PEEP 13  4. Acute on chronic diastolic heart failure--she was  getting Lasix 20 mg IV q12 but that was placed on hold given hypotension. Last dose was morning of May 20th. 5. Probable FELIX--Most likely has a sleep apnea recommend formal sleep study as an outpatient. 6. Type II DM with hyperglycemia--dose of Lantus increased over weekend from 14 to 18 units but prior to admission was taking 60 units in morning and 20 units in the evening. A1c checked this admission 8.7%  7. High residuals--will hold tube feeds for now. Added Reglan 5 mg IV q6 on Thursday  8. Right pleural effusion--continue to monitor. 9. Advanced directives--see is DNR CCA as of 5/20 evening per intensivist discussion with son. Case discussed with Dr. Josue Licea of pulmonary/critical care in ICU. NOTE: This report was transcribed using voice recognition software.  Every effort was made to ensure

## 2020-05-22 NOTE — PROGRESS NOTES
(20 4065)    norepinephrine 12 mcg/min (20 0524)    dextrose 5% in lactated ringers 50 mL/hr at 20 1853    DOPamine 1 mcg/kg/min (20 0952)    propofol 35 mcg/kg/min (20 0353)    sodium chloride Stopped (20 0515)    dextrose       PRN Meds:perflutren lipid microspheres, sodium chloride flush, sodium chloride flush, heparin flush, acetaminophen **OR** acetaminophen, glucose, dextrose, glucagon (rDNA), dextrose    OBJECTIVE:  /71   Pulse 69   Temp 97.2 °F (36.2 °C) (Axillary)   Resp 20   Ht 5' 5\" (1.651 m)   Wt 259 lb (117.5 kg)   SpO2 99%   BMI 43.10 kg/m²   Temp  Av.2 °F (36.2 °C)  Min: 97 °F (36.1 °C)  Max: 97.5 °F (36.4 °C)  Constitutional:  The patient is on vent     CNS    Sedated /vent ac60% 99 sat   NGT  HR 60'S   PICC RUE   Lines: pIV  AA LINE  PICC RUE    Macdonald     Radiology:  Laboratory and Tests Review:  Lab Results   Component Value Date    WBC 7.4 2020    WBC 4.3 (L) 2020    WBC 4.9 2020    HGB 11.5 2020    HCT 38.2 2020    MCV 90.3 2020     2020     No results found for: [facility]  Lab Results   Component Value Date    ALT 16 2020    AST 39 (H) 2020    ALKPHOS 164 (H) 2020    BILITOT 0.5 2020     Lab Results   Component Value Date     2020    K 4.0 2020    K 4.4 2020    CL 97 2020    CO2 30 2020    BUN 22 2020    CREATININE 1.2 2020    CREATININE 1.1 2020    CREATININE 0.7 2020    GFRAA 52 2020    LABGLOM 43 2020    GLUCOSE 275 2020    GLUCOSE 208 2012    PROT 7.0 2020    LABALBU 2.6 2020    LABALBU 3.7 2012    CALCIUM 8.4 2020    BILITOT 0.5 2020    ALKPHOS 164 2020    AST 39 2020    ALT 16 2020     Lab Results   Component Value Date    CRP 2.3 (H) 2020    CRP 2.0 (H) 2020    CRP 1.9 (H) 2020     Lab Results   Component Value Date    SEDRATE 80 (H) 10/19/2018       Microbiology:   No results for input(s): COVID19 in the last 72 hours. Recent Labs     05/20/20  0117 05/20/20  0426 05/21/20  0545 05/22/20  0400   CRP  --  1.9* 2.0* 2.3*   FERRITIN  --   --  1,253  --    LDH  --   --  436*  --    DDIMER  --  [id number]   FIBRINOGEN  --  457 421 405   INR 1.5  --   --   --    PROTIME 16.7*  --   --   --    AST  --  45* 42* 39*   ALT  --  22 18 16     Lab Results   Component Value Date    CHOL 42 05/16/2020    TRIG 27 05/16/2020    HDL 26 05/16/2020    LDLCALC 11 05/16/2020    LABVLDL 5 05/16/2020       Lab Results   Component Value Date    BLOODCULT2 5 Days- no growth 05/14/2020    BLOODCULT2 5 Days- no growth 02/12/2016    ORG Proteus mirabilis 05/01/2020    ORG Enterococcus faecalis 05/01/2020    ORG Escherichia coli 12/13/2019     WOUND/ABSCESS   Date Value Ref Range Status   05/14/2020 Growth not present  Final   05/01/2020   Final    One colony  Too fastidious for susceptibility testing. 12/13/2019 (A)  Final    Mixed delbert isolated. Further workup and sensitivity testing  is not routinely indicated and will not be performed.   Mixed delbert isolated includes:  Beta hemolytic Strep species (Group B)  Corynebacteria  Alpha hemolytic species (catalase negative gram positive coccobacili)     12/13/2019 One colony  Final        ASSESSMENT/PLAN:    Pt is being RX FOR covid-19 f/u covid posiitve  POSS ASPIRATION  Resp failure on vent - due to above  Leukopenia resolved  sacral ulcer poa stage 3 WOUND CX ngtd  kala    5/19 RESP CX Few yeast   Few Gram positive cocci in chains   Few gram positive rods Diphtheroid-like     5/14 BLOOD CX ngtd    Follow COVID-19/ERNESTINE-COV2 LABS  REPEAT BLOOD CX  Pending   5/15 tocilizumab (ACTEMRA)   5/14 il-6 24  5/19 convalescent plasma   The assigned patient code is 04238 for convalescent palsma       vancomycin (VANCOCIN) intermittent dosing (placeholder), RX Placeholder TROUGH 23.5     doxycycline

## 2020-05-22 NOTE — CARE COORDINATION
5/22/2020  Covid POSITIVE:  Cm transition of care:  Spoke to Dr. Sammy Sidhu who suggests transfer to [facility] possibly Monday or Tuesday- beginning of nex week. Call placed to pts noreen June ([phone]) to review LTACH information. Currently the only facility able to accept ETT and + Covid patients is [facility]. He has been informed and all questions answered at this time. He understands that the future plans may be for [facility] and right now it is a lot to absorb and he is okay with this. He will absolutely need informed prior to any transfer/discharge to [facility] and updated at that time on his mothers condition. Call placed to Ck Palafox with [facility] at [phone]- He is speaking to his CNO about potential future LTACH needs. NO PRECERT needed. Pt is Medicare and Medicaid (straight). CM/SS to follow.  geoffrey Electronically signed by Brant Neville RN-BC on 5/22/2020 at 2:42 PM

## 2020-05-22 NOTE — PROGRESS NOTES
Pharmacy Consultation Note  (Antibiotic Dosing and Monitoring)    Initial consult date: 5/13  Consulting physician: Dr William Davenport  Drug(s): Vancomycin IV  Indication: Healthcare Associated Pneumonia    Ht Readings from Last 1 Encounters:   05/21/20 5' 5\" (1.651 m)     Wt Readings from Last 1 Encounters:   05/22/20 259 lb (117.5 kg)       Pt is a 81 yo F admitted from the nursing home and being treated for healthcare associated pneumonia. Recently tested positive for covid-19 on 5/11. S/P Actemra on 5/15 and convalescent plasma on 5/19    Age/  Gender ActBW IBW DW  Allergy Information   80 y.o.     female 126.1 kg 57 kg 84.74 kg  Penicillins and Sulfa antibiotics                 Date  WBC BUN/CR UOP Drug/Dose Time   Given Level(s)   (Time) Comments   5/13  (#1) 6.5 39/0.8 --- Vancomycin 1,500 mg IV Q18H   2218     5/14  (#2) 9.3 40/1.1 0.61 mL/kg/hr Vancomycin 1,500 mg IV Once 1721 Random level @ 1544 = 9.4 mcg/mL    5/15  (#3) 6.6 35/0.8 --- Vancomycin 1,500 mg IV Q18H 1205 Random level @ 0853 = 14.8 mg/mL    5/16  (#4) 2.7 33/0.8 1.1 Vancomycin 1500 mg IV q18h 0525 Level 31.6 @0630  Trough 17.2 @2344    5/17  (#5) 3.2 - 1.5 Vancomycin 1500 mg IV q18h 0057  1828     5/18  (#6) 3.5 23/0.8 --- Vancomycin 1500 mg IV q18h 1330     5/19  (#7) 4.9 19/0.8 0.94 mL/kg/hr Vancomycin 1500 mg IV q18h 1130 Trough @ 0441 = 21.1 True trough estimated ~17.7 mcg/mL   5/20  (#8) 4.3 17/0.7 0.9 mL/kg/hr Vancomycin 1500 mg IV q18h 0638     5/21  (#9) 7.4 22/1.1 0.31 mL/kg/hr Vancomycin 1500 mg IV q18h 0011 Trough @ 1705 = 25.2 mcg/mL      5/22  (#10) --- 22/1.2 0.64 mL/kg/hr No dose  Random level @ 0400 = 23.5 mcg/mL    5/23  (#11)      Random level with morning labs      Estimated Creatinine Clearance: 44 mL/min (A) (based on SCr of 1.2 mg/dL (H)).   UOP over the past 24 hours:       Intake/Output Summary (Last 24 hours) at 5/22/2020 1126  Last data filed at 5/22/2020 0515  Gross per 24 hour   Intake 2660.01 ml   Output 1800 ml   Net 860.01

## 2020-05-22 NOTE — PROGRESS NOTES
Value Ref Range Status   05/21/2020 7.4 4.5 - 11.5 E9/L Final   05/20/2020 4.3 (L) 4.5 - 11.5 E9/L Final   05/19/2020 4.9 4.5 - 11.5 E9/L Final     Hemoglobin   Date Value Ref Range Status   05/22/2020 11.2 (L) 11.5 - 15.5 g/dL Final   05/22/2020 11.5 11.5 - 15.5 g/dL Final   05/21/2020 11.4 (L) 11.5 - 15.5 g/dL Final     Hematocrit   Date Value Ref Range Status   05/22/2020 37.2 34.0 - 48.0 % Final   05/22/2020 38.2 34.0 - 48.0 % Final   05/21/2020 37.8 34.0 - 48.0 % Final     MCV   Date Value Ref Range Status   05/21/2020 90.3 80.0 - 99.9 fL Final   05/20/2020 89.8 80.0 - 99.9 fL Final   05/19/2020 88.6 80.0 - 99.9 fL Final     Platelets   Date Value Ref Range Status   05/21/2020 217 130 - 450 E9/L Final   05/20/2020 223 130 - 450 E9/L Final   05/19/2020 236 130 - 450 E9/L Final     Sodium   Date Value Ref Range Status   05/22/2020 138 132 - 146 mmol/L Final   05/21/2020 135 132 - 146 mmol/L Final   05/20/2020 139 132 - 146 mmol/L Final     Potassium   Date Value Ref Range Status   05/22/2020 4.0 3.5 - 5.0 mmol/L Final   05/21/2020 4.0 3.5 - 5.0 mmol/L Final   05/20/2020 4.1 3.5 - 5.0 mmol/L Final     Potassium reflex Magnesium   Date Value Ref Range Status   05/14/2020 4.4 3.5 - 5.0 mmol/L Final   10/19/2018 4.4 3.5 - 5.0 mmol/L Final     Chloride   Date Value Ref Range Status   05/22/2020 97 (L) 98 - 107 mmol/L Final   05/21/2020 94 (L) 98 - 107 mmol/L Final   05/20/2020 96 (L) 98 - 107 mmol/L Final     CO2   Date Value Ref Range Status   05/22/2020 30 (H) 22 - 29 mmol/L Final   05/21/2020 27 22 - 29 mmol/L Final   05/20/2020 30 (H) 22 - 29 mmol/L Final     BUN   Date Value Ref Range Status   05/22/2020 22 8 - 23 mg/dL Final   05/21/2020 22 8 - 23 mg/dL Final   05/20/2020 17 8 - 23 mg/dL Final     CREATININE   Date Value Ref Range Status   05/22/2020 1.2 (H) 0.5 - 1.0 mg/dL Final   05/21/2020 1.1 (H) 0.5 - 1.0 mg/dL Final   05/20/2020 0.7 0.5 - 1.0 mg/dL Final     Glucose   Date Value Ref Range Status

## 2020-05-23 NOTE — PLAN OF CARE
Problem: Falls - Risk of:  Goal: Will remain free from falls  Description: Will remain free from falls  Outcome: Met This Shift     Problem: Skin Integrity:  Goal: Will show no infection signs and symptoms  Description: Will show no infection signs and symptoms  Outcome: Met This Shift     Problem: OXYGENATION/RESPIRATORY FUNCTION  Goal: Patient will maintain patent airway  Outcome: Met This Shift     Problem: Restraint Use - Nonviolent/Non-Self-Destructive Behavior:  Goal: Absence of restraint-related injury  Description: Absence of restraint-related injury  5/23/2020 0133 by Bolivar Benjamin RN  Outcome: Met This Shift  5/22/2020 2120 by Desire Fierro RN  Outcome: Met This Shift     Problem: Restraint Use - Nonviolent/Non-Self-Destructive Behavior:  Goal: Absence of restraint indications  Description: Absence of restraint indications  5/23/2020 0133 by Bolivar Benjamin RN  Outcome: Not Met This Shift  Note: Patient educated on restraints and lines/tubes connected to her. Patient continues to pull at lines and tubes and is not redirectable at this time. Restraints continued for patient's safety. Will continue to assess.     5/22/2020 2120 by Desire Fierro RN  Outcome: Not Met This Shift

## 2020-05-23 NOTE — PROGRESS NOTES
--      --   --    MPV 10.6  --   --     < > = values in this interval not displayed. Recent Labs     05/21/20  1234   BC 24 Hours- no growth       Recent Labs     05/21/20  1234   BLOODCULT2 24 Hours- no growth       24 HR INTAKE/OUTPUT:      Intake/Output Summary (Last 24 hours) at 5/23/2020 1621  Last data filed at 5/23/2020 1400  Gross per 24 hour   Intake 3888.43 ml   Output 750 ml   Net 3138.43 ml         ATTESTATION:  ICU Staff Physician note of personal involvement in Care  As the attending physician, I certify that I personally reviewed the patients history and personnally examined the patient to confirm the physical findings described above,  And that I reviewed the relevant imaging studies and available reports. I also discussed the differential diagnosis and all of the proposed management plans with the patient and individuals accompanying the patient to this visit. They had the opportunity to ask questions about the proposed management plans and to have those questions answered. This patient has a high probability of sudden, clinically significant deterioration, which requires the highest level of physician preparedness to intervene urgently. I managed/supervised life or organ supporting interventions that required frequent physician assessment. I devoted my full attention to the direct care of this patient for the amount of time indicated below. Time I spent with the family or surrogate(s) is included only if the patient was incapable of providing the necessary information or participating in medical decisions - Time devoted to teaching and to any procedures I billed separately is not included.      CRITICAL CARE TIME:  30 minutes    Cristiano Pena MD  Pulmonary and Critical Care Medicine

## 2020-05-23 NOTE — PROGRESS NOTES
0.9%  ml infusion 75 mcg/hr (20 0800)    norepinephrine 10 mcg/min (20 0800)    dextrose 5% in lactated ringers 50 mL/hr at 20 0845    propofol 35 mcg/kg/min (20 0800)    sodium chloride 12.5 mL/hr (20 0942)    dextrose       PRN Meds:albuterol-ipratropium, perflutren lipid microspheres, sodium chloride flush, sodium chloride flush, heparin flush, acetaminophen **OR** acetaminophen, glucose, dextrose, glucagon (rDNA), dextrose    OBJECTIVE:  BP (!) 111/59   Pulse 105   Temp 96.9 °F (36.1 °C) (Axillary)   Resp 20   Ht 5' 5\" (1.651 m)   Wt 260 lb (117.9 kg)   SpO2 96%   BMI 43.27 kg/m²   Temp  Av °F (36.1 °C)  Min: 94.4 °F (34.7 °C)  Max: 98.6 °F (37 °C)  Constitutional:  The patient is on vent     CNS    Sedated /vent ac60% 99 sat   NGT  HR 60'S   PICC RUE   Lines: pIV  AA LINE  PICC RUE    Macdonald     Radiology:  Laboratory and Tests Review:  Lab Results   Component Value Date    WBC 7.4 2020    WBC 4.3 (L) 2020    WBC 4.9 2020    HGB 11.2 (L) 2020    HCT 37.2 2020    MCV 90.3 2020     2020     No results found for: [facility]  Lab Results   Component Value Date    ALT 18 2020    AST 53 (H) 2020    ALKPHOS 169 (H) 2020    BILITOT 0.5 2020     Lab Results   Component Value Date     2020    K 5.4 2020    K 4.4 2020    CL 96 2020    CO2 27 2020    BUN 25 2020    CREATININE 1.7 2020    CREATININE 1.2 2020    CREATININE 1.1 2020    GFRAA 35 2020    LABGLOM 29 2020    GLUCOSE 245 2020    GLUCOSE 208 2012    PROT 7.0 2020    LABALBU 2.7 2020    LABALBU 3.7 2012    CALCIUM 8.4 2020    BILITOT 0.5 2020    ALKPHOS 169 2020    AST 53 2020    ALT 18 2020     Lab Results   Component Value Date    CRP 2.3 (H) 2020    CRP 2.0 (H) 2020    CRP 1.9 (H) 2020 intermittent dosing (placeholder), RX Placeholder TROUGH 23.5     doxycycline hyclate (VIBRAMYCIN) capsule 100 mg, 2 times per day     lactobacillus (CULTURELLE) capsule 1 capsule, Daily     cefepime (MAXIPIME) 1 g IVPB extended (mini-bag), Q8H     zinc gluconate tablet 50 mg, Daily  allopurinol (ZYLOPRIM) tablet 100 mg, BID  calcium-vitamin D (OSCAL-500) 500-200 MG-UNIT per tablet 1 tablet, Daily     atorvastatin (LIPITOR) tablet 10 mg, Nightly       SUGGEST STOPPING ATBX IN FEW DAYS IF CX REMAIN NEG   ADD ANTIFUNGAL       · Monitor labs    Imaging and labs were reviewed per medical records.         Electronically signed by Krystle Cristobal MD on 5/23/2020 at 12:23 PM

## 2020-05-23 NOTE — PROGRESS NOTES
[address] [place], [place] [address]   ICU Progress Note    Admitting Date and Time: 5/13/2020  2:47 PM  Admit Dx: Acute hypoxemic respiratory failure ([facility] [address] [J96.01]    Subjective:     5/18: Patient resting comfortably. Attempted to communicate via phone but not next to patient. She is not in any distress. 5/19: Patient intubated this AM after developing respiratory distress when turned during a bath.      5/20: Patient had Dopamine started yesterday afternoon for bradycardia and hypotension. Overnight got tachycardia  on Doparmine and blood pressure dropped. RRT was called and Levophed was added to allow for decrease in Dopamine drip which was felt to be causing tachycardia. Electrolytes were replaced as well. Arterial line was inserted. 5/21: Tried to wean off Dopamine but got tachycardic. She vasopressor depressant. Currently, on Levophed 12 mcg and Dopamine 1 mcg.     5/22: Patient currently prone but will be placed supine this morning. Remains on same drips. 5/23: Patient prone currently. No issues overnight.      budesonide  0.5 mg Nebulization BID    chlorhexidine  15 mL Mouth/Throat BID    levothyroxine  250 mcg Oral QAM AC    metoclopramide  5 mg Intravenous Q6H    enoxaparin  1 mg/kg Subcutaneous BID    vancomycin (VANCOCIN) intermittent dosing (placeholder)   Other RX Placeholder    insulin glargine  18 Units Subcutaneous Nightly    midazolam  5 mg Intravenous Once    lidocaine  5 mL Intradermal Once    sodium chloride  20 mL Intravenous Once    insulin lispro  0-12 Units Subcutaneous 4 times per day    insulin lispro  0-6 Units Subcutaneous Nightly    doxycycline hyclate  100 mg Oral 2 times per day    [Held by provider] furosemide  20 mg Intravenous BID    folic acid  1 mg Oral Daily    ipratropium-albuterol  1 ampule Inhalation Q4H    lactobacillus  1 capsule Oral Daily    potassium bicarb-citric acid  20 mEq Oral Daily    miconazole   Topical BID    acetaminophen  650 CHEST PORTABLE   Final Result   Stable bilateral pulmonary infiltrates      Probable right pleural effusion      Endotracheal tube tip in good position         XR ABDOMEN FOR NG/OG/NE TUBE PLACEMENT   Final Result   Nasogastric tube tip overlies the region of the antrum of the stomach         XR CHEST PORTABLE   Final Result   Further likely interval worsening diffuse airspace and interstitial   abnormalities, as above. XR CHEST PORTABLE   Final Result   Probable interval worsening diffuse airspace and interstitial abnormalities,   especially right upper lung with moderate cardiomegaly and probable right   pleural effusion. Findings likely represent known pneumonia with underlying   CHF/edema and possible ARDS. XR CHEST PORTABLE   Final Result   1. No significant change in diffuse airspace interstitial opacities   bilaterally likely due to edema (cardiogenic or noncardiogenic) and/or   pneumonia. 2. Moderate cardiomegaly, small to moderate right pleural effusion, and   questionable trace left pleural effusion, findings that can be seen with   congestive heart failure. 3. Unchanged right basilar passive atelectasis. Superimposed pneumonia is   not excluded. CT CHEST WO CONTRAST   Final Result   Cardiomegaly with asymmetric edema versus multifocal infection. Small right pleural effusion. Indeterminate mediastinal lymphadenopathy. Cardiomegaly. XR CHEST PORTABLE   Final Result   Stable chest with no change in diffuse bilateral interstitial and airspace   disease plus or minus right pleural effusion. Findings may reflect edema or   multifocal infection superimposed on chronic interstitial lung changes. XR CHEST PORTABLE   Final Result   Findings favoring pulmonary edema with small-moderate right pleural effusion,   cardiomegaly and pulmonary vascular indistinctness.              Assessment:  Active Problems:    Acute hypoxemic respiratory failure present on

## 2020-05-23 NOTE — PLAN OF CARE
Problem: Pain:  Goal: Pain level will decrease  Description: Pain level will decrease  5/23/2020 0817 by James Townsend RN  Outcome: Met This Shift  5/23/2020 0759 by James Townsend RN  Outcome: Met This Shift  Goal: Control of acute pain  Description: Control of acute pain  5/23/2020 0817 by James Townsend RN  Outcome: Met This Shift  5/23/2020 0759 by James Townsend RN  Outcome: Met This Shift  Goal: Control of chronic pain  Description: Control of chronic pain  5/23/2020 0817 by James Townsend RN  Outcome: Met This Shift  5/23/2020 0759 by James Townsend RN  Outcome: Met This Shift     Problem: Falls - Risk of:  Goal: Will remain free from falls  Description: Will remain free from falls  5/23/2020 0817 by James Townsend RN  Outcome: Met This Shift  5/23/2020 0759 by James Townsend RN  Outcome: Met This Shift  5/23/2020 0133 by Cecil Murphy RN  Outcome: Met This Shift  Goal: Absence of physical injury  Description: Absence of physical injury  5/23/2020 0817 by James Townsend RN  Outcome: Met This Shift  5/23/2020 0759 by James Townsend RN  Outcome: Met This Shift     Problem: Skin Integrity:  Goal: Absence of new skin breakdown  Description: Absence of new skin breakdown  5/23/2020 0817 by James Townsend RN  Outcome: Met This Shift  5/23/2020 0759 by James Townsend RN  Outcome: Met This Shift     Problem: OXYGENATION/RESPIRATORY FUNCTION  Goal: Patient will maintain patent airway  5/23/2020 0817 by James Townsend RN  Outcome: Met This Shift  5/23/2020 0133 by Cecil Murphy RN  Outcome: Met This Shift     Problem: Restraint Use - Nonviolent/Non-Self-Destructive Behavior:  Goal: Absence of restraint-related injury  Description: Absence of restraint-related injury  5/23/2020 0817 by James Townsend RN  Outcome: Met This Shift  5/23/2020 0759 by James Townsend RN  Outcome: Met This Shift  5/23/2020 0133 by Cecil Murphy RN  Outcome: Met This Shift  5/22/2020 2120 patient  5/23/2020 4122 by Gilbert Vo RN  Outcome: Not Met This Shift     Problem: Restraint Use - Nonviolent/Non-Self-Destructive Behavior:  Goal: Absence of restraint indications  Description: Absence of restraint indications  5/23/2020 0817 by Gilbert Vo RN  Outcome: Not Met This Shift  5/23/2020 0759 by Gilbert Vo RN  Outcome: Met This Shift  5/23/2020 0133 by Nathalie Gould RN  Outcome: Not Met This Shift  Note: Patient educated on restraints and lines/tubes connected to her. Patient continues to pull at lines and tubes and is not redirectable at this time. Restraints continued for patient's safety. Will continue to assess. 5/22/2020 2120 by Matthew Lynn RN  Outcome: Not Met This Shift     Problem: Gas Exchange - Impaired  Goal: Absence of hypoxia  5/23/2020 0817 by Gilbert Vo RN  Outcome: Not Met This Shift     Problem:  Body Temperature -  Risk of, Imbalanced  Goal: Will regain or maintain usual level of consciousness  5/23/2020 0817 by Gilbert Vo RN  Outcome: Not Met This Shift     Problem: Risk for Fluid Volume Deficit  Goal: Maintain normal heart rhythm  5/23/2020 0817 by Gilbert Vo RN  Outcome: Not Met This Shift     Problem: Fatigue  Goal: Verbalize increase energy and improved vitality  5/23/2020 0817 by Gilbert Vo RN  Outcome: Not Met This Shift

## 2020-05-24 NOTE — PROGRESS NOTES
PULMONARY MEDICINE FOLLOW UP    I called the patient's son Casey Said, updated him on the patient's condition, worsening renal failure and worsening hypotension. I asked Mr Teodoro Feng the patient's opinion about dialysis and he replied she would not want it and the family would not want to prolong the inevitable. He requests to be allowed to come visit his mother, I told him I would ask our authorities.     Yenifer Lomas MD  Pulmonary and Critical Care Medicine

## 2020-05-24 NOTE — CONSULTS
also dropped, requiring increasing doses of Levophed. She remains prone. She is unresponsive, sedated. Past Medical History:   Diagnosis Date    Anemia     CAD (coronary artery disease)     CHF (congestive heart failure) (HCC)     Decubitus ulcer of sacral region, stage 3 (HCC)     Diabetes mellitus ([facility] [address] [facility]     H/O echocardiogram 2/13/16    45%, Indeterminate diastolic function    HTN (hypertension)     Pneumonia 5/13/2020    Stroke ([facility] [facility]     Thyroid disease        Past Surgical History:   Procedure Laterality Date    BREAST SURGERY      CHOLECYSTECTOMY      CYST REMOVAL      total of 16  multiple sites    EYE SURGERY      cataracts    HIP SURGERY      rt and lt replacements    HYSTERECTOMY      KNEE SURGERY      rt knee    ROTATOR CUFF REPAIR Right        History reviewed. No pertinent family history. reports that she has quit smoking. She has never used smokeless tobacco. She reports that she does not drink alcohol or use drugs.     Allergies:  Penicillins and Sulfa antibiotics    Current Medications:    polyethylene glycol (GLYCOLAX) packet 17 g, BID  metoclopramide (REGLAN) injection 10 mg, Q6H  insulin glargine (LANTUS) injection vial 12 Units, Nightly  hydrocortisone sodium succinate PF (SOLU-CORTEF) injection 50 mg, Q6H  [START ON 5/25/2020] heparin (porcine) injection 4,000 Units, PRN  [START ON 5/25/2020] heparin (porcine) injection 2,000 Units, PRN  [START ON 5/25/2020] heparin 25,000 units in dextrose 5% 250 mL infusion, Continuous  0.9 % sodium chloride infusion, Continuous  albuterol-ipratropium (COMBIVENT RESPIMAT)  MCG/ACT inhaler 1 puff, Q6H PRN  gabapentin (NEURONTIN) capsule 100 mg, Nightly  pantoprazole (PROTONIX) injection 40 mg, BID  midodrine (PROAMATINE) tablet 10 mg, TID WC  fluconazole (DIFLUCAN) in 0.9 % sodium chloride IVPB 200 mg, Q24H  chlorhexidine (PERIDEX) 0.12 % solution 15 mL, BID  levothyroxine (SYNTHROID) tablet 250 mcg, QAM AC  perflutren lipid Jer Womack. I discussed physical examination with her bedside RN and MICU attending physician.     Data:   Labs:  CBC with Differential:    Lab Results   Component Value Date    WBC 7.4 05/21/2020    RBC 4.13 05/21/2020    HGB 11.2 05/22/2020    HCT 37.2 05/22/2020     05/21/2020    MCV 90.3 05/21/2020    MCH 27.4 05/21/2020    MCHC 30.3 05/21/2020    RDW 18.3 05/21/2020    NRBC 1.0 05/17/2020    SEGSPCT 63 03/04/2014    BANDSPCT 1 03/18/2016    METASPCT 1.0 05/16/2020    LYMPHOPCT 5.3 05/21/2020    MONOPCT 2.3 05/21/2020    BASOPCT 0.4 05/21/2020    MONOSABS 0.00 05/21/2020    LYMPHSABS 0.37 05/21/2020    EOSABS 0.45 05/21/2020    BASOSABS 0.00 05/21/2020     CMP:    Lab Results   Component Value Date     05/24/2020    K 5.4 05/24/2020    K 4.4 05/14/2020    CL 95 05/24/2020    CO2 23 05/24/2020    BUN 33 05/24/2020    CREATININE 3.3 05/24/2020    GFRAA 16 05/24/2020    LABGLOM 13 05/24/2020    GLUCOSE 160 05/24/2020    GLUCOSE 208 04/17/2012    PROT 6.9 05/24/2020    LABALBU 2.5 05/24/2020    LABALBU 3.7 04/17/2012    CALCIUM 8.2 05/24/2020    BILITOT 0.4 05/24/2020    ALKPHOS 146 05/24/2020    AST 82 05/24/2020    ALT 26 05/24/2020     Ionized Calcium:  No results found for: IONCA  Magnesium:    Lab Results   Component Value Date    MG 1.7 05/21/2020     Phosphorus:    Lab Results   Component Value Date    PHOS 4.6 05/21/2020     U/A:    Lab Results   Component Value Date    COLORU Yellow 10/19/2018    PHUR 5.5 10/19/2018    WBCUA 5-10 10/12/2018    RBCUA NONE 10/12/2018    BACTERIA MANY 10/12/2018    CLARITYU Clear 10/19/2018    SPECGRAV 1.010 10/19/2018    LEUKOCYTESUR Negative 10/19/2018    UROBILINOGEN 0.2 10/19/2018    BILIRUBINUR Negative 10/19/2018    BLOODU Negative 10/19/2018    GLUCOSEU Negative 10/19/2018     Microalbumen/Creatinine ratio:  No components found for: RUCREAT  Iron Saturation:  No components found for: PERCENTFE  TIBC:  No results found for: TIBC  FERRITIN:    Lab Results

## 2020-05-24 NOTE — PROGRESS NOTES
CRITICAL CARE PROGRESS NOTE    The patient's case was discussed in multidisciplinary rounds including critical care specialist, nursing, RT and pharmacy. Her evaluation is as follows:     80year old woman with PMH of hypothyroidism, CVA, HTN, CHF, CAD, atrial fibrillation, DM, admitted to ICU for management of COVID-19 pneumonia and respiratory failure. The patient has had a complicated course of disease with development of ARDS, elevated D-dimers suggesting prothrombotic state and possible DVT, bradycardia and septic shock    Intubated on 5/19  Tocilizumab: 5/14  Convalescent plasma administration on 5/19    --She received 1L NS yesterday, has worsening renal failure and now oliguric, nephrology consulted  --Remains in shock with vasopressor support with norepinephrine; today her IV fluids were stopped, few hours after became profoundly hypotensive and needed higher doses of norepinephrine.   --PEEP decreased to 8    A/P  1) Acute hypoxemic respiratory failure and septic shock secondary to COVID-19 pneumonia and ARDS, now with VAP  --Continue with mechanical ventilation with lung protective strategy. PCV PIP17/ RR 20/50% FiO2/PEEP 11  Airway compliance: 20  Pplat: 27  --Prone position, possibly supine tomorrow  --Sedation:  Propofol   Analgesia: Fentanyl  --Antimicrobial regimen:Doxycyclin and fluconazole  --Cultures reviewed  --Vasopressor support with norepinephrine infusion    2) Acute kidney injury secondary to Sepsis induced/Intrinsic renal/parenchymal disorder causing ATN, with hyperkalemia  --Avoid nephrotoxins and dose medications according GFR  --Nephrology consulted  --Management of hyperkalemia: Stopped LR.  Recheck BMP, if worse, then will administer Calcium gluconate + D50W/insulin 10 units IV X1    3) Diabetes mellitus  --Management with insulin administration, increased lantus administration due to hyperglycemia     4) Hypertension  --Antihypertensives on hold due to shock    5) Bradycardia

## 2020-05-24 NOTE — PROGRESS NOTES
500-200 MG-UNIT per tablet 1 tablet, Daily      check 5/21  blood cx ngtd  5/19 resp cx Few yeast   Few Gram positive cocci in chains   Few gram positive rods Diphtheroid-like      Renal to see      · Monitor labs    Imaging and labs were reviewed per medical records.         Electronically signed by Ab Goddard MD on 5/24/2020 at 10:02 AM

## 2020-05-24 NOTE — PROGRESS NOTES
[address] [place], [place] [address]ist   ICU Progress Note    Admitting Date and Time: 5/13/2020  2:47 PM  Admit Dx: Acute hypoxemic respiratory failure ([facility] [address] [J96.01]    Subjective:     5/18: Patient resting comfortably. Attempted to communicate via phone but not next to patient. She is not in any distress. 5/19: Patient intubated this AM after developing respiratory distress when turned during a bath.      5/20: Patient had Dopamine started yesterday afternoon for bradycardia and hypotension. Overnight got tachycardia  on Doparmine and blood pressure dropped. RRT was called and Levophed was added to allow for decrease in Dopamine drip which was felt to be causing tachycardia. Electrolytes were replaced as well. Arterial line was inserted. 5/21: Tried to wean off Dopamine but got tachycardic. She vasopressor depressant. Currently, on Levophed 12 mcg and Dopamine 1 mcg.     5/22: Patient currently prone but will be placed supine this morning. Remains on same drips. 5/23: Patient prone currently. No issues overnight.    5/24: Patient prone currently. Renal function worsened and nephrology had been consulted.      polyethylene glycol  17 g Per NG tube BID    insulin glargine  22 Units Subcutaneous Nightly    gabapentin  100 mg Oral Nightly    pantoprazole  40 mg Intravenous BID    midodrine  10 mg Oral TID WC    fluconazole  200 mg Intravenous Q24H    chlorhexidine  15 mL Mouth/Throat BID    levothyroxine  250 mcg Oral QAM AC    metoclopramide  5 mg Intravenous Q6H    enoxaparin  1 mg/kg Subcutaneous BID    vancomycin (VANCOCIN) intermittent dosing (placeholder)   Other RX Placeholder    midazolam  5 mg Intravenous Once    lidocaine  5 mL Intradermal Once    sodium chloride  20 mL Intravenous Once    insulin lispro  0-12 Units Subcutaneous 4 times per day    insulin lispro  0-6 Units Subcutaneous Nightly    doxycycline hyclate  100 mg Oral 2 times per day    [Held by provider] furosemide 20 mg Intravenous BID    folic acid  1 mg Oral Daily    lactobacillus  1 capsule Oral Daily    potassium bicarb-citric acid  20 mEq Oral Daily    miconazole   Topical BID    acetaminophen  650 mg Oral Once    sodium chloride flush  10 mL Intravenous 2 times per day    lidocaine  5 mL Intradermal Once    heparin flush  3 mL Intravenous 2 times per day    zinc gluconate  50 mg Oral Daily    allopurinol  100 mg Oral BID    calcium-vitamin D  1 tablet Oral Daily    sertraline  25 mg Oral Nightly    atorvastatin  10 mg Oral Nightly    insulin glargine  10 Units Subcutaneous Once     albuterol-ipratropium, 1 puff, Q6H PRN  perflutren lipid microspheres, 1.5 mL, ONCE PRN  sodium chloride flush, 10 mL, PRN  sodium chloride flush, 10 mL, PRN  heparin flush, 3 mL, PRN  acetaminophen, 650 mg, Q6H PRN    Or  acetaminophen, 650 mg, Q6H PRN  glucose, 15 g, PRN  dextrose, 12.5 g, PRN  glucagon (rDNA), 1 mg, PRN  dextrose, 100 mL/hr, PRN         Objective:    BP (!) 85/58   Pulse 104   Temp 99 °F (37.2 °C) (Axillary)   Resp 20   Ht 5' 5\" (1.651 m)   Wt 260 lb (117.9 kg)   SpO2 95%   BMI 43.27 kg/m²   Patient is intubated and sedated. Due to the current efforts to prevent transmission of COVID-19 and also the need to preserve PPE for other caregivers, a face-to-face encounter with the patient was not performed. That being said, all relevant records and diagnostic tests were reviewed, including laboratory results and imaging. Please reference any relevant documentation elsewhere. Care will be coordinated with pulmonary/critical care.         Recent Labs     05/23/20  1452 05/23/20  2204 05/24/20  0815    132 134   K 5.2* 5.5* 5.5*   CL 99 95* 96*   CO2 24 24 25   BUN 27* 28* 31*   CREATININE 2.1* 2.4* 2.9*   GLUCOSE 195* 161* 146*   CALCIUM 8.1* 7.9* 8.4*       Recent Labs     05/22/20  0400 05/23/20  0436   ALKPHOS 164* 169*   PROT 7.0 7.0   LABALBU 2.6* 2.7*   BILITOT 0.5 0.5   AST 39* 53*   ALT 16 18 Recent Labs     05/21/20  2206 05/22/20  0400 05/22/20  1242   HGB 11.4* 11.5 11.2*   HCT 37.8 38.2 37.2       COVID-19/ERNESTINE-COV2 LABS  No results for input(s): COVID19 in the last 72 hours. Recent Labs     05/22/20  0400 05/22/20  1242 05/23/20  0436 05/24/20  0533   CRP 2.3*  --  1.9*  --    PROCAL  --   --  0.40*  --    DDIMER 4467  --  3730 2470   FIBRINOGEN 405  --  371 397   AST 39*  --  53*  --    ALT 16  --  18  --    TRIG  --  201*  --   --          Lab Results   Component Value Date    CHOL 42 05/16/2020    TRIG 201 05/22/2020    HDL 26 05/16/2020    LDLCALC 11 05/16/2020    LABVLDL 5 05/16/2020 5/14 5/20         Hemoglobin A1c [[id number]] (Abnormal) Collected: 05/20/20 0426     Specimen: Blood Updated: 05/20/20 1040      Hemoglobin A1C 8.7 %          T4, free [[id number]] (Abnormal) Collected: 05/22/20 1242     Specimen: Blood Updated: 05/22/20 1458      T4 Free 0.92 ng/dL      TSH WITHOUT REFLEX [[id number]] (Abnormal) Collected: 05/22/20 1242     Specimen: Blood Updated: 05/22/20 1344      TSH 8.880 uIU/mL        Radiology:   XR ABDOMEN (KUB) (SINGLE AP VIEW)   Final Result   Enteric tube in appropriate position. No evidence of bowel obstruction. XR CHEST PORTABLE   Final Result   1. Interval extubation. 2. No significant improvement in the multifocal lung consolidation and trace   right pleural effusion. XR CHEST PORTABLE   Final Result   1. Withdrawal of the endotracheal tube with its tip at or above the thoracic   inlet. I would suggest advancing the tube at least 3 cm to be within the mid   trachea. 2. Otherwise, stable chest x-ray with findings likely reflecting congestive   failure or pneumonia. Follow-up to resolution is recommended. XR CHEST PORTABLE   Final Result   Right upper extremity PICC line with tip in the SVC. Stable ET tube and enteric tube. Stable examination with multifocal airspace consolidation and bilateral   pleural effusions.

## 2020-05-25 NOTE — PROGRESS NOTES
Systems:   No face to face encounter done as patient is in isolation for COVID 19 .    Discussed the case and PE findings with ICU RN     MEDS (scheduled):    polyethylene glycol  17 g Per NG tube BID    metoclopramide  10 mg Intravenous Q6H    hydrocortisone sodium succinate PF  50 mg Intravenous Q6H    insulin glargine  6 Units Subcutaneous Nightly    gabapentin  100 mg Oral Nightly    pantoprazole  40 mg Intravenous BID    midodrine  10 mg Oral TID WC    fluconazole  200 mg Intravenous Q24H    chlorhexidine  15 mL Mouth/Throat BID    levothyroxine  250 mcg Oral QAM AC    vancomycin (VANCOCIN) intermittent dosing (placeholder)   Other RX Placeholder    lidocaine  5 mL Intradermal Once    sodium chloride  20 mL Intravenous Once    insulin lispro  0-12 Units Subcutaneous 4 times per day    insulin lispro  0-6 Units Subcutaneous Nightly    doxycycline hyclate  100 mg Oral 2 times per day    folic acid  1 mg Oral Daily    lactobacillus  1 capsule Oral Daily    miconazole   Topical BID    acetaminophen  650 mg Oral Once    sodium chloride flush  10 mL Intravenous 2 times per day    lidocaine  5 mL Intradermal Once    heparin flush  3 mL Intravenous 2 times per day    zinc gluconate  50 mg Oral Daily    allopurinol  100 mg Oral BID    calcium-vitamin D  1 tablet Oral Daily    sertraline  25 mg Oral Nightly    atorvastatin  10 mg Oral Nightly    insulin glargine  10 Units Subcutaneous Once       MEDS (infusions):   vasopressin (Septic Shock) infusion 0.03 Units/min (05/25/20 1427)    heparin (porcine) 8.5 Units/kg/hr (05/25/20 1109)    sodium chloride 100 mL/hr at 05/25/20 1426    fentaNYL 5 mcg/ml in 0.9%  ml infusion 50 mcg/hr (05/25/20 1426)    norepinephrine 10 mcg/min (05/25/20 1435)    propofol Stopped (05/25/20 1427)    dextrose         MEDS (prn):  heparin (porcine), heparin (porcine), albuterol-ipratropium, perflutren lipid microspheres, sodium chloride flush, sodium rash  Neuro: awake, alert, interactive      DATA:    Recent Labs     05/25/20  0552   WBC 10.5   HGB 11.1*   HCT 36.7   MCV 91.3        Recent Labs     05/23/20  0436  05/24/20  0533  05/24/20  1514 05/25/20  0552 05/25/20  1130      < > 134   < > 133 131* 130*   K 5.4*   < > 5.6*   < > 5.4* 6.4* 5.6*   CL 96*   < > 95*   < > 95* 94* 94*   CO2 27   < > 23   < > 23 22 21*   BUN 25*   < > 30*   < > 33* 39* 42*   CREATININE 1.7*   < > 2.8*   < > 3.3* 3.6* 3.7*   ALT 18  --  26  --   --  30  --    AST 53*  --  82*  --   --  94*  --    BILITOT 0.5  --  0.4  --   --  0.5  --    ALKPHOS 169*  --  146*  --   --  167*  --     < > = values in this interval not displayed. No results found for: LABPROT    ASSESSMENT / RECOMMENDATIONS:      1. Acute kidney injury, hemodynamically- mediated due to septic shock, likely evolving acute tubular necrosis. She has evolved from oliguric to now anuric. Azotemia is progressing. She is hyperkalemic. Markedly hypervolemic  2. COVID-19 pneumonia with superimposed ventilator acquired pneumonia. Status post azithromycin,Tocilizumab, convalescent serum, intensive supportive care. Nonetheless, progressing.     Recommendations  1. Continue intensive supportive care including blood pressure support, treatment of the underlying sepsis. 2. Would resume the IV fluid for hemodynamic support. She is oxygenating and ventilating so I feel she will tolerate this in the short-term  3. Little new to do from a renal standpoint besides initiate renal replacement therapy. Discussion with family/POA They feel she would not want to take this next supportive measure. No plans to initiate dialysis  4. Follow labs, UO        Family leaning toward comfort measures , no heroic measure , no dialysis .      Electronically signed by Rangel Marina MD on 5/25/2020 at 3:45 PM

## 2020-05-25 NOTE — PROGRESS NOTES
CRITICAL CARE PROGRESS NOTE    The patient's case was discussed in multidisciplinary rounds including critical care specialist, nursing, RT and pharmacy. Her evaluation is as follows:     80year old woman with PMH of hypothyroidism, CVA, HTN, CHF, CAD, atrial fibrillation, DM, admitted to ICU for management of COVID-19 pneumonia and respiratory failure. The patient has had a complicated course of disease with development of ARDS, elevated D-dimers suggesting prothrombotic state and possible DVT, bradycardia and septic shock    Intubated on 5/19  Tocilizumab: 5/14  Convalescent plasma administration on 5/19    --Ms Sergio Guzman is worsening today in the following aspects: her renal failure and hyperkalemia; worsening shock, requiring higher doses of norepinephrine, added dopamine and vasopressin infusions.      --PEEP remains at 8 and FiO2 at 50%  --I updated the patient's family on her worsening condition, her son will come to visit. BP (!) 162/81   Pulse 69   Temp 97.5 °F (36.4 °C) (Axillary)   Resp 20   Ht 5' 5\" (1.651 m)   Wt 260 lb (117.9 kg)   SpO2 97%   BMI 43.27 kg/m²   General:  Comatose, in prone position, with edema and erythema of face, with skin sloughing n forehead, intubated  HEENT: Edema of face, mouth with ETT, excoriation on nose, no cervical adenopathy palpated  Respiratory: Lungs with very diminished breath sounds bilaterally, no accessory muscle use  CV: Regular rate, no murmurs, unable to evaluate for JVD, 1+ leg edema  Abdomen: Soft, non tender, + bowel sounds, no lesions  Skin: Hydrated, adequate turgor, multiple areas of skin sloughing on face, capillary refill <2 seconds  Extremities: Flaccid extremities, distal pulses present  Neurology: Comatose, sedated with propofol, neck is supple, no meningitic signs present. A/P  1) Acute hypoxemic respiratory failure and septic shock secondary to COVID-19 pneumonia and ARDS, now with VAP.    Ms Sergio Guzman has evidence of cytokine storm

## 2020-05-25 NOTE — PROGRESS NOTES
0600  Gross per 24 hour   Intake 3024 ml   Output 313 ml   Net 2711 ml       Temp max: Temp (24hrs), Av.7 °F (35.9 °C), Min:95.6 °F (35.3 °C), Max:97.8 °F (36.6 °C)      Antibiotic Regimen:  Antibiotic Dose Date Initiated Date Discontinued   Cefepime   1 g IV Q8H    Doxycycline 100 mg IV Q12H    Doxycycline 100 mg PO BID        Cultures:  available culture and sensitivity results were reviewed in EPIC  Cultures sent and are pending. Culture Date Result    Covid-19 (previous encounter)  Positive   Blood #1  NG   Blood #2  NG   Wound Culture  NG   Respiratory Panel  Negative   Legionella Antigen  Negative   Covid-19  Positive   Endotrachial Resp Culture  Few yeast   Few Gram positive cocci in chains   Few gram positive  rods Diphtheroid-like   Blood #1  No growth @ 24 hours   Blood #2  No growth @ 24 hours     Assessment:  · Consulted by Dr. Adryan Jerry to dose/monitor vancomycin  · Goal trough level:  15-20 mcg/mL  · Pt is a 79 y/o F who is covid-19 positive and being treated for healthcare aquired pneumonia  · Serum creatinine today: 3.6; CrCl < 20 mL/min; baseline Scr ~ 0.9  · : Random vancomycin level @ 1544 = 9.4 mcg/mL (~18 hours)  · 5/15: Random level @ 0853 = 14.8 mcg/mL  · : Level drawn this morning was drawn 1 hour after vancomycin was initiated - not a true trough. Resp Cx still pending  · : Trough therapeutic (17.2)  · : Trough @ 0441 = 21.1 mcg/mL; following dose was held. Estimated true trough ~ 17.7 mcg/mL. Renal function remaining stable. Patient intubated  · : Rapid increase in sCr from day prior; UOP drastically reduced.  Trough ordered prior to next dose @ 1705 = 25.2 mcg/mL  · : Random level @ 0400 = 23.5 mcg/mL  · : Random AM level = 21.8 mcg/mL  · : Random AM level = 23.7 mcg/mL    Plan:  · No dose today   · Repeat level in 2-3 days  · Follow renal function  · Pharmacist will follow and monitor/adjust

## 2020-05-26 NOTE — PROGRESS NOTES
Daily  lactobacillus (CULTURELLE) capsule 1 capsule, 1 capsule, Oral, Daily  miconazole (MICOTIN) 2 % powder, , Topical, BID  acetaminophen (TYLENOL) tablet 650 mg, 650 mg, Oral, Once **AND** [COMPLETED] diphenhydrAMINE (BENADRYL) injection 25 mg, 25 mg, Intravenous, Once  sodium chloride flush 0.9 % injection 10 mL, 10 mL, Intravenous, 2 times per day  sodium chloride flush 0.9 % injection 10 mL, 10 mL, Intravenous, PRN  lidocaine 1 % injection 5 mL, 5 mL, Intradermal, Once  sodium chloride flush 0.9 % injection 10 mL, 10 mL, Intravenous, PRN  heparin flush 100 UNIT/ML injection 300 Units, 3 mL, Intravenous, 2 times per day  heparin flush 100 UNIT/ML injection 300 Units, 3 mL, Intracatheter, PRN  zinc gluconate tablet 50 mg, 50 mg, Oral, Daily  allopurinol (ZYLOPRIM) tablet 100 mg, 100 mg, Oral, BID  calcium-vitamin D (OSCAL-500) 500-200 MG-UNIT per tablet 1 tablet, 1 tablet, Oral, Daily  sertraline (ZOLOFT) tablet 25 mg, 25 mg, Oral, Nightly  acetaminophen (TYLENOL) tablet 650 mg, 650 mg, Oral, Q6H PRN **OR** acetaminophen (TYLENOL) suppository 650 mg, 650 mg, Rectal, Q6H PRN  glucose (GLUTOSE) 40 % oral gel 15 g, 15 g, Oral, PRN  glucagon (rDNA) injection 1 mg, 1 mg, Intramuscular, PRN  dextrose 5 % solution, 100 mL/hr, Intravenous, PRN  atorvastatin (LIPITOR) tablet 10 mg, 10 mg, Oral, Nightly  insulin glargine (LANTUS) injection vial 10 Units, 10 Units, Subcutaneous, Once    Ventilator Settings:Vent Information  $Ventilation: $Subsequent Day  Vent Type: [id number]  Vent Mode: AC/PC  Vt Ordered: 0 mL  Rate Set: 20 bmp  Peak Flow: 0 L/min  Pressure Support: 0 cmH20  FiO2 : 50 %  SpO2: 98 %  SpO2/FiO2 ratio: 196  Sensitivity: 3  PEEP/CPAP: 8  I Time/ I Time %: 1 s  Humidification Source: Heated wire   CBC:  Recent Labs     05/25/20  0552   WBC 10.5   RBC 4.02   HGB 11.1*   HCT 36.7      MCV 91.3   MCH 27.6   MCHC 30.2*   RDW 18.8*      BMP:  Recent Labs     05/25/20  1800 05/26/20  0009 05/26/20  0603

## 2020-05-26 NOTE — PLAN OF CARE
Discussed with Sabra Cooper (son) re [facility], the decision was made to focus on comfort measures only and withdraw life support measures. Patient was made DNR CC only. Palliative orders will be placed, patient will be terminally weaned from the vent.

## 2020-05-26 NOTE — PROGRESS NOTES
vancomycin (VANCOCIN) intermittent dosing (placeholder)   Other RX Placeholder    lidocaine  5 mL Intradermal Once    sodium chloride  20 mL Intravenous Once    insulin lispro  0-12 Units Subcutaneous 4 times per day    insulin lispro  0-6 Units Subcutaneous Nightly    doxycycline hyclate  100 mg Oral 2 times per day    folic acid  1 mg Oral Daily    lactobacillus  1 capsule Oral Daily    miconazole   Topical BID    acetaminophen  650 mg Oral Once    sodium chloride flush  10 mL Intravenous 2 times per day    lidocaine  5 mL Intradermal Once    heparin flush  3 mL Intravenous 2 times per day    zinc gluconate  50 mg Oral Daily    allopurinol  100 mg Oral BID    calcium-vitamin D  1 tablet Oral Daily    sertraline  25 mg Oral Nightly    atorvastatin  10 mg Oral Nightly    insulin glargine  10 Units Subcutaneous Once     dextrose, 25 g, PRN  heparin (porcine), 4,000 Units, PRN  heparin (porcine), 2,000 Units, PRN  albuterol-ipratropium, 1 puff, Q6H PRN  perflutren lipid microspheres, 1.5 mL, ONCE PRN  sodium chloride flush, 10 mL, PRN  sodium chloride flush, 10 mL, PRN  heparin flush, 3 mL, PRN  acetaminophen, 650 mg, Q6H PRN    Or  acetaminophen, 650 mg, Q6H PRN  glucose, 15 g, PRN  glucagon (rDNA), 1 mg, PRN  dextrose, 100 mL/hr, PRN         Objective:    /67   Pulse 72   Temp 96 °F (35.6 °C) (Axillary)   Resp 20   Ht 5' 5\" (1.651 m)   Wt 260 lb (117.9 kg)   SpO2 100%   BMI 43.27 kg/m²   Patient is intubated and sedated. Due to the current efforts to prevent transmission of COVID-19 and also the need to preserve PPE for other caregivers, a face-to-face encounter with the patient was not performed. That being said, all relevant records and diagnostic tests were reviewed, including laboratory results and imaging. Please reference any relevant documentation elsewhere. Care will be coordinated with pulmonary/critical care.         Recent Labs     05/25/20  1800 05/26/20  0009 inlet.  I would suggest advancing the tube at least 3 cm to be within the mid   trachea. 2. Otherwise, stable chest x-ray with findings likely reflecting congestive   failure or pneumonia. Follow-up to resolution is recommended. XR CHEST PORTABLE   Final Result   Right upper extremity PICC line with tip in the SVC. Stable ET tube and enteric tube. Stable examination with multifocal airspace consolidation and bilateral   pleural effusions. Findings would be consistent with a multifocal pneumonia. Given history of positive COVID-19 test, COVID-19 pneumonia is high in the   differential.         XR CHEST PORTABLE   Final Result   Stable bilateral pulmonary infiltrates      Probable right pleural effusion      Endotracheal tube tip in good position         XR ABDOMEN FOR NG/OG/NE TUBE PLACEMENT   Final Result   Nasogastric tube tip overlies the region of the antrum of the stomach         XR CHEST PORTABLE   Final Result   Further likely interval worsening diffuse airspace and interstitial   abnormalities, as above. XR CHEST PORTABLE   Final Result   Probable interval worsening diffuse airspace and interstitial abnormalities,   especially right upper lung with moderate cardiomegaly and probable right   pleural effusion. Findings likely represent known pneumonia with underlying   CHF/edema and possible ARDS. XR CHEST PORTABLE   Final Result   1. No significant change in diffuse airspace interstitial opacities   bilaterally likely due to edema (cardiogenic or noncardiogenic) and/or   pneumonia. 2. Moderate cardiomegaly, small to moderate right pleural effusion, and   questionable trace left pleural effusion, findings that can be seen with   congestive heart failure. 3. Unchanged right basilar passive atelectasis. Superimposed pneumonia is   not excluded. CT CHEST WO CONTRAST   Final Result   Cardiomegaly with asymmetric edema versus multifocal infection.       Small clinically. 2. Acute hypotension and bradycardia--currently on Levophed at 13 mcg/min; dopamine discontinued on 5/23 but resumed overnight. .  3. Acute hypoxic respiratory failure--patient intubated the morning 5/19. Current vent setting PC 20 FIO2 50%  PEEP 8.   4. Acute on chronic diastolic heart failure--she was  getting Lasix 20 mg IV q12 but that was placed on hold given hypotension. Last dose was morning of May 20th. 5. Probable FELIX--Most likely has a sleep apnea recommend formal sleep study as an outpatient. 6. Type II DM with hyperglycemia--dose of Lantus increased over weekend from 14 to 18 units but prior to admission was taking 60 units in morning and 20 units in the evening. A1c checked this admission 8.7%. Increase Lantus from 6 to 10u because of persistent hyperglycemia. 7. JANETH(woresening) with hyperkalemia--K 5.2 and creatinine 3.9 with low urine output 75 ml overnight. Nephrology consulted for additional input. Continue fluid management as per Nephrology. 8. High residuals with questionable GI bleed-- Added Reglan 5 mg IV q6 on Thursday but still with high residuals. Increased to 10 mg IV q6. Protonix drip started 5/21 but changed to IVP bid 5/23. 9. Right pleural effusion--continue to monitor. 10. Advanced directives--see is DNR CCA as of 5/20 evening per intensivist discussion with son. Overall prognosis poor. Palliative care consulted. Case discussed with RN. Will continue to follow peripherally while in ICU. NOTE: This report was transcribed using voice recognition software. Every effort was made to ensure accuracy; however, inadvertent computerized transcription errors may be present.      Electronically signed by Franklin Mason MD on 5/26/2020 at 9:55 AM

## 2020-05-26 NOTE — CARE COORDINATION
5/26/2020  Covid Positive (5/14, 5/18). CM transition of care:   Pt remains in icu, vent, sedation, heparin gtt, vasopressin gtt. Reviewed case with Charge RN- 53Ivana Adair- pt is unstable for any transfer to [facility]. Per LTACH they have no Covid beds, as well. Pt is DNR-CCA, Palliative Care on case- family requests to continue current care as of 5/25/2020 noted. CM/SS to follow.   Electronically signed by Solitario Rodriguez RN-BC on 5/26/2020 at 10:08 AM

## 2020-05-26 NOTE — PROGRESS NOTES
**Vancomycin discontinued by ID. Pharmacy to sign off on the consult. Please re-consult if needed. Thanks! **    Johnny White, PharmD 5/26/2020 12:04 PM   [phone]         Pharmacy Consultation Note  (Antibiotic Dosing and Monitoring)    Initial consult date: 5/13  Consulting physician: Dr Padilla Gaffney  Drug(s): Vancomycin IV  Indication: Healthcare Associated Pneumonia    Ht Readings from Last 1 Encounters:   05/21/20 5' 5\" (1.651 m)     Wt Readings from Last 1 Encounters:   05/23/20 260 lb (117.9 kg)       Pt is a 81 yo F admitted from the nursing home and being treated for healthcare associated pneumonia. Recently tested positive for covid-19 on 5/11.  S/P Actemra on 5/15 and convalescent plasma on 5/19    Age/  Gender ActBW IBW DW  Allergy Information   80 y.o.     female 126.1 kg 57 kg 84.74 kg  Penicillins and Sulfa antibiotics                 Date  WBC BUN/CR UOP (mL/kg/hr) Drug/Dose Time   Given Level(s)   (Time) Comments   5/13  (#1) 6.5 39/0.8 --- Vancomycin 1,500 mg IV Q18H   2218     5/14  (#2) 9.3 40/1.1 0.6 Vancomycin 1,500 mg IV Once 1721 Random level @ 1544 = 9.4 mcg/mL    5/15  (#3) 6.6 35/0.8 --- Vancomycin 1,500 mg IV Q18H 1205 Random level @ 0853 = 14.8 mg/mL    5/16  (#4) 2.7 33/0.8 1.1 Vancomycin 1500 mg IV q18h 0525 Level 31.6 @0630  Trough 17.2 @2344    5/17  (#5) 3.2 - 1.5 Vancomycin 1500 mg IV q18h 0057  1828     5/18  (#6) 3.5 23/0.8 --- Vancomycin 1500 mg IV q18h 1330     5/19  (#7) 4.9 19/0.8 0.9 Vancomycin 1500 mg IV q18h 1130 Trough @ 0441 = 21.1 True trough estimated ~17.7 mcg/mL   5/20  (#8) 4.3 17/0.7 0.9 Vancomycin 1500 mg IV q18h 0638     5/21  (#9) 7.4 22/1.1 0.3 Vancomycin 1500 mg IV q18h 0011 Trough @ 1705 = 25.2 mcg/mL      5/22  (#10) --- 22/1.2 0.6 No dose -- Random level @ 0400 = 23.5 mcg/mL    5/23  (#11) -- 25/1.7 0.3 No dose -- 21.8 mcg/mL @ 0436    5/24  (#12) -- 30/2.8 0.3 No dose --     5/25  (#13) 10.5 39/3.6 0.1 No dose -- 23.7 mcg/mL @ 3331    5/26  (#14) -- 47/3.9 0.22 mL/kg/hr No dose        Estimated Creatinine Clearance: 14 mL/min (A) (based on SCr of 3.9 mg/dL (H)). UOP over the past 24 hours:       Intake/Output Summary (Last 24 hours) at 2020 1144  Last data filed at 2020 0800  Gross per 24 hour   Intake 2671 ml   Output 631 ml   Net 2040 ml       Temp max: Temp (24hrs), Av.5 °F (35.8 °C), Min:96 °F (35.6 °C), Max:98 °F (36.7 °C)      Antibiotic Regimen:  Antibiotic Dose Date Initiated Date Discontinued   Cefepime   1 g IV Q8H    Doxycycline 100 mg IV Q12H    Doxycycline 100 mg PO BID        Cultures:  available culture and sensitivity results were reviewed in EPIC  Cultures sent and are pending. Culture Date Result    Covid-19 (previous encounter)  Positive   Blood #1  NG   Blood #2  NG   Wound Culture  NG   Respiratory Panel  Negative   Legionella Antigen  Negative   Covid-19  Positive   Endotrachial Resp Culture  Few yeast   Few Gram positive cocci in chains   Few gram positive  rods Diphtheroid-like   Blood #1  No growth @ 24 hours   Blood #2  No growth @ 24 hours     Assessment:  · Consulted by Dr. Deanna Ernst to dose/monitor vancomycin  · Goal trough level:  15-20 mcg/mL  · Pt is a 81 y/o F who is covid-19 positive and being treated for healthcare aquired pneumonia  · Serum creatinine today: 3.6; CrCl < 20 mL/min; baseline Scr ~ 0.9  · : Random vancomycin level @ 1544 = 9.4 mcg/mL (~18 hours)  · 5/15: Random level @ 0853 = 14.8 mcg/mL  · : Level drawn this morning was drawn 1 hour after vancomycin was initiated - not a true trough. Resp Cx still pending  · : Trough therapeutic (17.2)  · : Trough @ 0441 = 21.1 mcg/mL; following dose was held. Estimated true trough ~ 17.7 mcg/mL. Renal function remaining stable. Patient intubated  · : Rapid increase in sCr from day prior; UOP drastically reduced.  Trough ordered prior to next dose @ 1705 = 25.2 mcg/mL  · : Random level @

## 2020-06-10 NOTE — DISCHARGE SUMMARY
[facility] Physician Discharge Summary       [id number]          [address] [place] [address] at [place] [address] [address]  [phone]          Activity level:     Diet: No diet orders on file    Labs:    Condition at discharge:     Dispo:  Home  Patient ID:  Britney Foster  [id number]  13 y.o.  1935    Admit date: 2020    Discharge date and time:  6/10/2020  6:03 PM    Admission Diagnoses: Active Problems:    Atrial fibrillation with RVR (HCC)    DM type 2 (diabetes mellitus, type 2) (HCC)    Morbid obesity (HCC)    Type 2 diabetes mellitus, without long-term current use of insulin (HCC)    Acute respiratory failure with hypoxia (HCC)    Pneumonia    History of  novel coronavirus disease (COVID-19)    Required emergent intubation    Acute pulmonary edema (HCC)    Acute respiratory distress syndrome (ARDS) due to COVID-19 virus    COVID-19    Pneumonia due to COVID-19 virus    Acute hypoxemic respiratory failure (HCC)    Hypotension    Palliative care encounter    Goals of care, counseling/discussion    DNR (do not resuscitate) discussion  Resolved Problems:    * No resolved hospital problems. *      Discharge Diagnoses:  Active Problems:    Atrial fibrillation with RVR (HCC)    DM type 2 (diabetes mellitus, type 2) (HCC)    Morbid obesity (HCC)    Type 2 diabetes mellitus, without long-term current use of insulin (HCC)    Acute respiratory failure with hypoxia (HCC)    Pneumonia    History of  novel coronavirus disease (COVID-19)    Required emergent intubation    Acute pulmonary edema (HCC)    Acute respiratory distress syndrome (ARDS) due to COVID-19 virus    COVID-19    Pneumonia due to COVID-19 virus    Acute hypoxemic respiratory failure (HCC)    Hypotension    Palliative care encounter    Goals of care, counseling/discussion    DNR (do not Xr Chest Portable    Result Date: 5/13/2020  EXAMINATION: ONE XRAY VIEW OF THE CHEST 5/13/2020 3:28 pm COMPARISON: 10/19/2018 HISTORY: ORDERING SYSTEM PROVIDED HISTORY: shortness of breath TECHNOLOGIST PROVIDED HISTORY: Reason for exam:->shortness of breath FINDINGS: Diffuse pulmonary vascular indistinctness. Small-moderate right pleural effusion. Moderate cardiomegaly. No pneumothorax. Mild right basilar atelectasis. Findings favoring pulmonary edema with small-moderate right pleural effusion, cardiomegaly and pulmonary vascular indistinctness. Note that more than 30 minutes was spent in preparing discharge papers, discussing discharge with patient, medication review, etc.    NOTE: This report was transcribed using voice recognition software. Every effort was made to ensure accuracy; however, inadvertent computerized transcription errors may be present.      Signed:  Electronically signed by Loretta Monahan MD on 6/10/2020 at 6:03 PM

## 2022-09-13 NOTE — PROGRESS NOTES
time   Prognosis for improvements is good     This plan will be re-evaluated and revised in 1 week  if warranted. Patient stated goals: Agreed with above,   Treatment goals discussed with Patient   The Patient understand(s) the diagnosis, prognosis and plan of care     CPT code:  63053  bedside swallow eval    Evaluation time includes  review of current medical information, gathering information on past medical history/social history and prior level of function, completion of standardized testing/informal observation of tasks, assessment of data, and development of POC/Goals. [x]The admitting diagnosis and active problem list, as listed below have been reviewed prior to initiation of this evaluation.      ADMITTING DIAGNOSIS: Acute hypoxemic respiratory failure ([facility] [J96.01]     ACTIVE PROBLEM LIST:   Patient Active Problem List   Diagnosis    Pressure ulcer of coccygeal region, stage 4 ([address]    Pressure ulcer, sacrum    Cardiomegaly    Acute systolic CHF (congestive heart failure) (HCC)    Atrial fibrillation ([address]    DM type 2 (diabetes mellitus, type 2) ([address]    Morbid obesity ([address]    Type 2 diabetes mellitus, without long-term current use of insulin ([address]    Acute hypoxemic respiratory failure ([address]    Pneumonia    History of 2019 novel coronavirus disease (COVID-19)       Jennifer Lopez MSCCC/SLP  Speech Language Pathologist  [id number] Negative

## 2024-12-05 NOTE — LETTER
11/5/2018    Genet MUNOZ [address] [place] [address]      Dear Syed Marte,    My name is Antonella Smith and I am a registered nurse who partners with Tashi Wesley MD to improve patients' health. Tashi Wesley MD believes you would benefit from working with me. As a member of your health care team, I would work with other providers involved in your care, offer education for your specific health conditions, and connect you with additional resources as needed. I will collaborate with Tashi Wesley MD to support you in following your treatment plan. The additional support I provide is no additional cost to you. My primary focus is to help you achieve specific goals and improve your health. We are committed to walk with you on this journey and look forward to working with you. I will be reaching out to you soon to discuss Care Coordination services. Please feel free to call me to further discuss your healthcare needs. You can reach me at [phone].     In good health,       Antonella Smith RN, Lola PurvisJabari De Guerda [address] Presenting from [facility]. Was previously at [facility] prior to last admission at [facility].